# Patient Record
Sex: FEMALE | Race: WHITE | NOT HISPANIC OR LATINO | Employment: FULL TIME | URBAN - METROPOLITAN AREA
[De-identification: names, ages, dates, MRNs, and addresses within clinical notes are randomized per-mention and may not be internally consistent; named-entity substitution may affect disease eponyms.]

---

## 2017-02-14 DIAGNOSIS — Z30.9 ENCOUNTER FOR CONTRACEPTIVE MANAGEMENT, UNSPECIFIED CONTRACEPTIVE ENCOUNTER TYPE: ICD-10-CM

## 2017-05-03 DIAGNOSIS — Z30.9 ENCOUNTER FOR CONTRACEPTIVE MANAGEMENT: ICD-10-CM

## 2017-05-26 RX ORDER — NORGESTIMATE AND ETHINYL ESTRADIOL 0.25-0.035
1 KIT ORAL DAILY
Qty: 28 TABLET | Refills: 1 | Status: SHIPPED | OUTPATIENT
Start: 2017-05-26 | End: 2017-07-25 | Stop reason: SDUPTHER

## 2017-07-25 DIAGNOSIS — Z30.9 ENCOUNTER FOR CONTRACEPTIVE MANAGEMENT, UNSPECIFIED TYPE: Primary | ICD-10-CM

## 2017-07-25 RX ORDER — NORGESTIMATE AND ETHINYL ESTRADIOL 0.25-0.035
1 KIT ORAL DAILY
Qty: 28 TABLET | Refills: 0 | Status: SHIPPED | OUTPATIENT
Start: 2017-07-25 | End: 2017-08-17

## 2017-08-17 ENCOUNTER — OFFICE VISIT (OUTPATIENT)
Dept: OBSTETRICS AND GYNECOLOGY | Facility: CLINIC | Age: 42
End: 2017-08-17
Payer: COMMERCIAL

## 2017-08-17 VITALS
SYSTOLIC BLOOD PRESSURE: 102 MMHG | WEIGHT: 108.13 LBS | HEIGHT: 63 IN | DIASTOLIC BLOOD PRESSURE: 80 MMHG | BODY MASS INDEX: 19.16 KG/M2

## 2017-08-17 DIAGNOSIS — Z11.51 SCREENING FOR HUMAN PAPILLOMAVIRUS: ICD-10-CM

## 2017-08-17 DIAGNOSIS — Z01.419 WELL FEMALE EXAM WITH ROUTINE GYNECOLOGICAL EXAM: Primary | ICD-10-CM

## 2017-08-17 DIAGNOSIS — Z12.31 VISIT FOR SCREENING MAMMOGRAM: ICD-10-CM

## 2017-08-17 PROCEDURE — 99999 PR PBB SHADOW E&M-EST. PATIENT-LVL III: CPT | Mod: PBBFAC,,, | Performed by: OBSTETRICS & GYNECOLOGY

## 2017-08-17 PROCEDURE — 87624 HPV HI-RISK TYP POOLED RSLT: CPT

## 2017-08-17 PROCEDURE — 99396 PREV VISIT EST AGE 40-64: CPT | Mod: S$GLB,,, | Performed by: OBSTETRICS & GYNECOLOGY

## 2017-08-17 PROCEDURE — 88175 CYTOPATH C/V AUTO FLUID REDO: CPT

## 2017-08-17 RX ORDER — BUPROPION HYDROCHLORIDE 300 MG/1
300 TABLET ORAL DAILY
Qty: 30 TABLET | Refills: 5 | Status: ON HOLD | OUTPATIENT
Start: 2017-08-17 | End: 2018-01-08 | Stop reason: HOSPADM

## 2017-08-17 RX ORDER — NORGESTIMATE AND ETHINYL ESTRADIOL 0.25-0.035
1 KIT ORAL DAILY
Qty: 28 TABLET | Refills: 11 | Status: ON HOLD | OUTPATIENT
Start: 2017-08-17 | End: 2017-12-22

## 2017-08-17 NOTE — PROGRESS NOTES
Subjective:       Patient ID: Yen Falcon is a 41 y.o. female.    Chief Complaint:  Well Woman (c/o stress and anxiety 11/03/15-pap,rfx hpv-negative )      History of Present Illness  - here for annual. Doing well on ocps. Feels stressed and anxious with work and home. No si/hi. Is on Wellbutrin  mg and doesn't feel like it's enough. Says when she takes 2 then she's able to focus better.     Past Medical History:   Diagnosis Date    Allergy     Angio-edema     Recurrent upper respiratory infection (URI)     Urticaria        History reviewed. No pertinent surgical history.      Current Outpatient Prescriptions:     ascorbic acid (VITAMIN C) 500 MG tablet, Take 500 mg by mouth once daily., Disp: , Rfl:     ASMANEX  mcg/actuation HFAA, INL 1 PUFF PO BID, Disp: , Rfl: 6    ipratropium-albuterol (COMBIVENT RESPIMAT)  mcg/actuation inhaler, Inhale 1 puff into the lungs 4 (four) times daily., Disp: , Rfl:     multivitamin (ONE DAILY MULTIVITAMIN) per tablet, Take 1 tablet by mouth once daily., Disp: , Rfl:     norgestimate-ethinyl estradiol (MONONESSA, 28,) 0.25-35 mg-mcg per tablet, Take 1 tablet by mouth once daily., Disp: 28 tablet, Rfl: 11    VENTOLIN HFA 90 mcg/actuation inhaler, INL 2 PFS PO QID PRF WHZ, Disp: , Rfl: 3    buPROPion (WELLBUTRIN SR) 150 MG TBSR 12 hr tablet, Take 150 mg by mouth 2 (two) times daily., Disp: , Rfl:     clindamycin (CLEOCIN) 300 MG capsule, Take 300 mg by mouth 3 (three) times daily., Disp: , Rfl:     diphenhydrAMINE (SOMINEX) 25 mg tablet, Take 25 mg by mouth nightly as needed., Disp: , Rfl:     fluticasone (FLONASE) 50 mcg/actuation nasal spray, 2 sprays by Each Nare route once daily., Disp: 1 Bottle, Rfl: 6    pseudoephedrine-guaifenesin  mg (MUCINEX D)  mg per tablet, Take 1 tablet by mouth every 12 (twelve) hours., Disp: , Rfl:     Review of patient's allergies indicates:   Allergen Reactions    Doxycycline Rash    Cat/feline  "products        GYN & OB History  Patient's last menstrual period was 2017.   Date of Last Pap: No result found    OB History    Para Term  AB Living   2 2       2   SAB TAB Ectopic Multiple Live Births           2      # Outcome Date GA Lbr Calixto/2nd Weight Sex Delivery Anes PTL Lv   2 Para 06    F CS-LTranv  N SHANIA   1 Para 01    M CS-LTranv  N SHANIA          Social History     Social History    Marital status:      Spouse name: N/A    Number of children: N/A    Years of education: N/A     Occupational History    Not on file.     Social History Main Topics    Smoking status: Former Smoker     Packs/day: 0.25     Quit date: 2013    Smokeless tobacco: Never Used    Alcohol use Yes    Drug use: No    Sexual activity: Yes     Partners: Male     Birth control/ protection: OCP      Comment:      Other Topics Concern    Not on file     Social History Narrative    No narrative on file       Family History   Problem Relation Age of Onset    Allergies Father     Asthma Father     Breast cancer Maternal Grandfather     Breast cancer Maternal Aunt     Colon cancer Neg Hx     Ovarian cancer Neg Hx        Review of Systems  Review of Systems   Respiratory: Negative for shortness of breath.    Cardiovascular: Negative for chest pain and palpitations.   Gastrointestinal: Negative for blood in stool, nausea and vomiting.   Genitourinary:        - see HPI   Skin: Negative for rash and wound.   Allergic/Immunologic: Negative for immunocompromised state.   Neurological: Negative for dizziness and syncope.   Hematological: Negative for adenopathy.   Psychiatric/Behavioral: Negative for behavioral problems.        Objective:     Vitals:    17 1053   BP: 102/80   Weight: 49 kg (108 lb 2.2 oz)   Height: 5' 3" (1.6 m)       Physical Exam:   Constitutional: She is oriented to person, place, and time. She appears well-developed and well-nourished.        Pulmonary/Chest: " Right breast exhibits no mass, no nipple discharge, no skin change, no tenderness and no swelling. Left breast exhibits no mass, no nipple discharge, no skin change, no tenderness and no swelling. Breasts are symmetrical.        Abdominal: Soft. She exhibits no distension. There is no tenderness.     Genitourinary: Vagina normal and uterus normal. There is no tenderness or lesion on the right labia. There is no tenderness or lesion on the left labia. Cervix is normal. Right adnexum displays no mass, no tenderness and no fullness. Left adnexum displays no mass, no tenderness and no fullness. No vaginal discharge found. Additional cervical findings: pap smear done          Musculoskeletal: Moves all extremeties.       Neurological: She is alert and oriented to person, place, and time.     Psychiatric: She has a normal mood and affect.        Assessment/ Plan:     Orders Placed This Encounter    HPV High Risk Genotypes, PCR    Mammo Digital Screening Bilat with Tomosynthesis CAD    Liquid-based pap smear, screening    norgestimate-ethinyl estradiol (MONONESSA, 28,) 0.25-35 mg-mcg per tablet       Yen was seen today for well woman.    Diagnoses and all orders for this visit:    Well female exam with routine gynecological exam  -     Liquid-based pap smear, screening  -     HPV High Risk Genotypes, PCR  -     Mammo Digital Screening Bilat with Tomosynthesis CAD; Future    Visit for screening mammogram  -     Mammo Digital Screening Bilat with Tomosynthesis CAD; Future    Screening for human papillomavirus  -     HPV High Risk Genotypes, PCR    Other orders  -     norgestimate-ethinyl estradiol (MONONESSA, 28,) 0.25-35 mg-mcg per tablet; Take 1 tablet by mouth once daily.    - will increase to Wellbutrin  mg daily. Patient will call me to let me know if that relieves her symptoms. Offered to switch to Lexapro; patient states she may be taken that in her 20s. Is unsure if she had any issues with  it.    Return in about 1 year (around 8/17/2018).

## 2017-08-23 LAB
HPV HR 12 DNA CVX QL NAA+PROBE: NEGATIVE
HPV16 DNA SPEC QL NAA+PROBE: NEGATIVE
HPV18 DNA SPEC QL NAA+PROBE: NEGATIVE

## 2017-12-21 ENCOUNTER — HOSPITAL ENCOUNTER (EMERGENCY)
Facility: HOSPITAL | Age: 42
Discharge: ANOTHER HOSPITAL | End: 2017-12-22
Attending: EMERGENCY MEDICINE | Admitting: INTERNAL MEDICINE
Payer: COMMERCIAL

## 2017-12-21 DIAGNOSIS — J90 PLEURAL EFFUSION ON LEFT: ICD-10-CM

## 2017-12-21 DIAGNOSIS — J98.01 BRONCHOSPASM: ICD-10-CM

## 2017-12-21 DIAGNOSIS — D72.828 OTHER ELEVATED WHITE BLOOD CELL (WBC) COUNT: ICD-10-CM

## 2017-12-21 DIAGNOSIS — Z85.118 HISTORY OF LUNG CANCER: Primary | ICD-10-CM

## 2017-12-21 DIAGNOSIS — R04.2 COUGH WITH HEMOPTYSIS: ICD-10-CM

## 2017-12-21 DIAGNOSIS — R06.02 SOB (SHORTNESS OF BREATH): ICD-10-CM

## 2017-12-21 LAB
ALBUMIN SERPL BCP-MCNC: 2 G/DL
ALP SERPL-CCNC: 148 U/L
ALT SERPL W/O P-5'-P-CCNC: 51 U/L
ANION GAP SERPL CALC-SCNC: 14 MMOL/L
AST SERPL-CCNC: 47 U/L
B-HCG UR QL: NEGATIVE
BACTERIA #/AREA URNS HPF: ABNORMAL /HPF
BASOPHILS # BLD AUTO: 0.04 K/UL
BASOPHILS NFR BLD: 0.2 %
BILIRUB SERPL-MCNC: 0.4 MG/DL
BILIRUB UR QL STRIP: NEGATIVE
BUN SERPL-MCNC: 12 MG/DL
CALCIUM SERPL-MCNC: 9 MG/DL
CHLORIDE SERPL-SCNC: 98 MMOL/L
CLARITY UR: CLEAR
CO2 SERPL-SCNC: 23 MMOL/L
COLOR UR: YELLOW
CREAT SERPL-MCNC: 0.7 MG/DL
CTP QC/QA: YES
DIFFERENTIAL METHOD: ABNORMAL
EOSINOPHIL # BLD AUTO: 0.9 K/UL
EOSINOPHIL NFR BLD: 4.4 %
ERYTHROCYTE [DISTWIDTH] IN BLOOD BY AUTOMATED COUNT: 13 %
EST. GFR  (AFRICAN AMERICAN): >60 ML/MIN/1.73 M^2
EST. GFR  (NON AFRICAN AMERICAN): >60 ML/MIN/1.73 M^2
GLUCOSE SERPL-MCNC: 119 MG/DL
GLUCOSE UR QL STRIP: NEGATIVE
HCT VFR BLD AUTO: 37.1 %
HGB BLD-MCNC: 12.3 G/DL
HGB UR QL STRIP: NEGATIVE
KETONES UR QL STRIP: NEGATIVE
LEUKOCYTE ESTERASE UR QL STRIP: NEGATIVE
LYMPHOCYTES # BLD AUTO: 2.3 K/UL
LYMPHOCYTES NFR BLD: 11.9 %
MCH RBC QN AUTO: 29.1 PG
MCHC RBC AUTO-ENTMCNC: 33.2 G/DL
MCV RBC AUTO: 88 FL
MICROSCOPIC COMMENT: ABNORMAL
MONOCYTES # BLD AUTO: 1.3 K/UL
MONOCYTES NFR BLD: 6.5 %
NEUTROPHILS # BLD AUTO: 15 K/UL
NEUTROPHILS NFR BLD: 77 %
NITRITE UR QL STRIP: NEGATIVE
PH UR STRIP: 5 [PH] (ref 5–8)
PLATELET # BLD AUTO: 476 K/UL
PMV BLD AUTO: 9.5 FL
POTASSIUM SERPL-SCNC: 3.6 MMOL/L
PROT SERPL-MCNC: 6.5 G/DL
PROT UR QL STRIP: NEGATIVE
RBC # BLD AUTO: 4.23 M/UL
RBC #/AREA URNS HPF: 1 /HPF (ref 0–4)
SODIUM SERPL-SCNC: 135 MMOL/L
SP GR UR STRIP: 1.01 (ref 1–1.03)
SQUAMOUS #/AREA URNS HPF: 5 /HPF
TROPONIN I SERPL DL<=0.01 NG/ML-MCNC: 0.01 NG/ML
URN SPEC COLLECT METH UR: ABNORMAL
UROBILINOGEN UR STRIP-ACNC: ABNORMAL EU/DL
WBC # BLD AUTO: 19.52 K/UL
WBC #/AREA URNS HPF: 5 /HPF (ref 0–5)
WBC CLUMPS URNS QL MICRO: ABNORMAL

## 2017-12-21 PROCEDURE — 25000242 PHARM REV CODE 250 ALT 637 W/ HCPCS: Performed by: EMERGENCY MEDICINE

## 2017-12-21 PROCEDURE — 96360 HYDRATION IV INFUSION INIT: CPT

## 2017-12-21 PROCEDURE — 81025 URINE PREGNANCY TEST: CPT | Performed by: EMERGENCY MEDICINE

## 2017-12-21 PROCEDURE — 93010 ELECTROCARDIOGRAM REPORT: CPT | Mod: ,,, | Performed by: INTERNAL MEDICINE

## 2017-12-21 PROCEDURE — 81000 URINALYSIS NONAUTO W/SCOPE: CPT

## 2017-12-21 PROCEDURE — 80053 COMPREHEN METABOLIC PANEL: CPT

## 2017-12-21 PROCEDURE — 94640 AIRWAY INHALATION TREATMENT: CPT

## 2017-12-21 PROCEDURE — 85025 COMPLETE CBC W/AUTO DIFF WBC: CPT

## 2017-12-21 PROCEDURE — 25000003 PHARM REV CODE 250: Performed by: EMERGENCY MEDICINE

## 2017-12-21 PROCEDURE — 12000002 HC ACUTE/MED SURGE SEMI-PRIVATE ROOM

## 2017-12-21 PROCEDURE — 99285 EMERGENCY DEPT VISIT HI MDM: CPT | Mod: 25

## 2017-12-21 PROCEDURE — 96361 HYDRATE IV INFUSION ADD-ON: CPT

## 2017-12-21 PROCEDURE — 93005 ELECTROCARDIOGRAM TRACING: CPT

## 2017-12-21 PROCEDURE — 84484 ASSAY OF TROPONIN QUANT: CPT

## 2017-12-21 PROCEDURE — 27100107 HC POCKET PEAK FLOW METER

## 2017-12-21 RX ORDER — AZITHROMYCIN 250 MG/1
500 TABLET, FILM COATED ORAL DAILY
Status: CANCELLED | OUTPATIENT
Start: 2017-12-22

## 2017-12-21 RX ORDER — CEFTRIAXONE 1 G/1
1 INJECTION, POWDER, FOR SOLUTION INTRAMUSCULAR; INTRAVENOUS
Status: CANCELLED | OUTPATIENT
Start: 2017-12-22

## 2017-12-21 RX ORDER — ONDANSETRON 2 MG/ML
4 INJECTION INTRAMUSCULAR; INTRAVENOUS EVERY 8 HOURS PRN
Status: CANCELLED | OUTPATIENT
Start: 2017-12-21

## 2017-12-21 RX ORDER — IPRATROPIUM BROMIDE AND ALBUTEROL SULFATE 2.5; .5 MG/3ML; MG/3ML
3 SOLUTION RESPIRATORY (INHALATION) EVERY 4 HOURS
Status: CANCELLED | OUTPATIENT
Start: 2017-12-22

## 2017-12-21 RX ORDER — MORPHINE SULFATE 10 MG/ML
4 INJECTION INTRAMUSCULAR; INTRAVENOUS; SUBCUTANEOUS EVERY 4 HOURS PRN
Status: CANCELLED | OUTPATIENT
Start: 2017-12-21

## 2017-12-21 RX ORDER — ACETAMINOPHEN 325 MG/1
650 TABLET ORAL EVERY 8 HOURS PRN
Status: CANCELLED | OUTPATIENT
Start: 2017-12-21

## 2017-12-21 RX ORDER — PANTOPRAZOLE SODIUM 40 MG/10ML
40 INJECTION, POWDER, LYOPHILIZED, FOR SOLUTION INTRAVENOUS DAILY
Status: CANCELLED | OUTPATIENT
Start: 2017-12-22

## 2017-12-21 RX ORDER — IPRATROPIUM BROMIDE AND ALBUTEROL SULFATE 2.5; .5 MG/3ML; MG/3ML
3 SOLUTION RESPIRATORY (INHALATION)
Status: COMPLETED | OUTPATIENT
Start: 2017-12-21 | End: 2017-12-21

## 2017-12-21 RX ORDER — NORGESTIMATE AND ETHINYL ESTRADIOL 0.25-0.035
1 KIT ORAL DAILY
Status: CANCELLED | OUTPATIENT
Start: 2017-12-22

## 2017-12-21 RX ORDER — MORPHINE SULFATE 10 MG/ML
2 INJECTION INTRAMUSCULAR; INTRAVENOUS; SUBCUTANEOUS EVERY 4 HOURS PRN
Status: CANCELLED | OUTPATIENT
Start: 2017-12-21

## 2017-12-21 RX ORDER — HEPARIN SODIUM 5000 [USP'U]/ML
5000 INJECTION, SOLUTION INTRAVENOUS; SUBCUTANEOUS EVERY 8 HOURS
Status: CANCELLED | OUTPATIENT
Start: 2017-12-22

## 2017-12-21 RX ORDER — AMOXICILLIN 250 MG
1 CAPSULE ORAL 2 TIMES DAILY
Status: CANCELLED | OUTPATIENT
Start: 2017-12-22

## 2017-12-21 RX ADMIN — IPRATROPIUM BROMIDE AND ALBUTEROL SULFATE 3 ML: .5; 3 SOLUTION RESPIRATORY (INHALATION) at 09:12

## 2017-12-21 RX ADMIN — SODIUM CHLORIDE 1000 ML: 0.9 INJECTION, SOLUTION INTRAVENOUS at 10:12

## 2017-12-22 ENCOUNTER — HOSPITAL ENCOUNTER (OUTPATIENT)
Facility: HOSPITAL | Age: 42
Discharge: HOME | DRG: 180 | End: 2017-12-23
Attending: HOSPITALIST | Admitting: HOSPITALIST
Payer: COMMERCIAL

## 2017-12-22 VITALS
OXYGEN SATURATION: 95 % | HEIGHT: 63 IN | HEART RATE: 120 BPM | RESPIRATION RATE: 25 BRPM | BODY MASS INDEX: 18.61 KG/M2 | WEIGHT: 105 LBS | TEMPERATURE: 98 F | DIASTOLIC BLOOD PRESSURE: 80 MMHG | SYSTOLIC BLOOD PRESSURE: 126 MMHG

## 2017-12-22 DIAGNOSIS — C34.92 CARCINOMA OF LEFT LUNG: Primary | ICD-10-CM

## 2017-12-22 DIAGNOSIS — J90 PLEURAL EFFUSION ON LEFT: ICD-10-CM

## 2017-12-22 PROBLEM — J18.9 PARAPNEUMONIC EFFUSION: Status: ACTIVE | Noted: 2017-12-22

## 2017-12-22 PROBLEM — J91.8 PARAPNEUMONIC EFFUSION: Status: ACTIVE | Noted: 2017-12-22

## 2017-12-22 PROBLEM — F41.9 ANXIETY: Status: ACTIVE | Noted: 2017-12-22

## 2017-12-22 PROBLEM — J96.01 ACUTE HYPOXEMIC RESPIRATORY FAILURE: Status: ACTIVE | Noted: 2017-12-22

## 2017-12-22 PROBLEM — C77.3 MALIGNANT NEOPLASM METASTATIC TO LYMPH NODE OF AXILLA: Status: ACTIVE | Noted: 2017-12-22

## 2017-12-22 LAB
ANION GAP SERPL CALC-SCNC: 12 MMOL/L
APPEARANCE FLD: ABNORMAL
APTT BLDCRRT: 25.3 SEC
BASOPHILS # BLD AUTO: 0.02 K/UL
BASOPHILS NFR BLD: 0.1 %
BODY FLD TYPE: ABNORMAL
BUN SERPL-MCNC: 13 MG/DL
CALCIUM SERPL-MCNC: 8.1 MG/DL
CHLORIDE SERPL-SCNC: 100 MMOL/L
CO2 SERPL-SCNC: 23 MMOL/L
COLOR FLD: YELLOW
CREAT SERPL-MCNC: 0.7 MG/DL
DIFFERENTIAL METHOD: ABNORMAL
EOSINOPHIL # BLD AUTO: 0.4 K/UL
EOSINOPHIL NFR BLD: 2.1 %
ERYTHROCYTE [DISTWIDTH] IN BLOOD BY AUTOMATED COUNT: 12.9 %
EST. GFR  (AFRICAN AMERICAN): >60 ML/MIN/1.73 M^2
EST. GFR  (NON AFRICAN AMERICAN): >60 ML/MIN/1.73 M^2
ESTIMATED AVG GLUCOSE: 100 MG/DL
GLUCOSE SERPL-MCNC: 139 MG/DL
GRAM STN SPEC: NORMAL
GRAM STN SPEC: NORMAL
HBA1C MFR BLD HPLC: 5.1 %
HCT VFR BLD AUTO: 35.2 %
HGB BLD-MCNC: 11.2 G/DL
INR PPP: 1
LYMPHOCYTES # BLD AUTO: 0.9 K/UL
LYMPHOCYTES NFR BLD: 4.5 %
LYMPHOCYTES NFR FLD MANUAL: 4 %
MAGNESIUM SERPL-MCNC: 1.8 MG/DL
MCH RBC QN AUTO: 28.7 PG
MCHC RBC AUTO-ENTMCNC: 31.8 G/DL
MCV RBC AUTO: 90 FL
MONOCYTES # BLD AUTO: 0.7 K/UL
MONOCYTES NFR BLD: 3.5 %
MONOS+MACROS NFR FLD MANUAL: 23 %
NEUTROPHILS # BLD AUTO: 17.5 K/UL
NEUTROPHILS NFR BLD: 89.8 %
NEUTROPHILS NFR FLD MANUAL: 19 %
OTHER CELLS FLD MANUAL: 54 %
PHOSPHATE SERPL-MCNC: 2.8 MG/DL
PLATELET # BLD AUTO: 444 K/UL
PMV BLD AUTO: 9.7 FL
POTASSIUM SERPL-SCNC: 3.5 MMOL/L
PROCALCITONIN SERPL IA-MCNC: 0.32 NG/ML
PROTHROMBIN TIME: 10.9 SEC
RBC # BLD AUTO: 3.9 M/UL
SODIUM SERPL-SCNC: 135 MMOL/L
TSH SERPL DL<=0.005 MIU/L-ACNC: 1.16 UIU/ML
WBC # BLD AUTO: 19.63 K/UL
WBC # FLD: 1159 /CU MM

## 2017-12-22 PROCEDURE — 84443 ASSAY THYROID STIM HORMONE: CPT

## 2017-12-22 PROCEDURE — 11000001 HC ACUTE MED/SURG PRIVATE ROOM

## 2017-12-22 PROCEDURE — 87205 SMEAR GRAM STAIN: CPT

## 2017-12-22 PROCEDURE — 25000003 PHARM REV CODE 250: Performed by: INTERNAL MEDICINE

## 2017-12-22 PROCEDURE — 85025 COMPLETE CBC W/AUTO DIFF WBC: CPT

## 2017-12-22 PROCEDURE — 85610 PROTHROMBIN TIME: CPT

## 2017-12-22 PROCEDURE — 25000242 PHARM REV CODE 250 ALT 637 W/ HCPCS: Performed by: NURSE PRACTITIONER

## 2017-12-22 PROCEDURE — 27000221 HC OXYGEN, UP TO 24 HOURS

## 2017-12-22 PROCEDURE — 88305 TISSUE EXAM BY PATHOLOGIST: CPT | Mod: 26,,, | Performed by: PATHOLOGY

## 2017-12-22 PROCEDURE — 80048 BASIC METABOLIC PNL TOTAL CA: CPT

## 2017-12-22 PROCEDURE — 94640 AIRWAY INHALATION TREATMENT: CPT

## 2017-12-22 PROCEDURE — 25000003 PHARM REV CODE 250: Performed by: NURSE PRACTITIONER

## 2017-12-22 PROCEDURE — 99223 1ST HOSP IP/OBS HIGH 75: CPT | Mod: ,,, | Performed by: INTERNAL MEDICINE

## 2017-12-22 PROCEDURE — G0378 HOSPITAL OBSERVATION PER HR: HCPCS

## 2017-12-22 PROCEDURE — 36415 COLL VENOUS BLD VENIPUNCTURE: CPT

## 2017-12-22 PROCEDURE — 89051 BODY FLUID CELL COUNT: CPT

## 2017-12-22 PROCEDURE — 84145 PROCALCITONIN (PCT): CPT

## 2017-12-22 PROCEDURE — G0379 DIRECT REFER HOSPITAL OBSERV: HCPCS

## 2017-12-22 PROCEDURE — 85730 THROMBOPLASTIN TIME PARTIAL: CPT

## 2017-12-22 PROCEDURE — 87075 CULTR BACTERIA EXCEPT BLOOD: CPT

## 2017-12-22 PROCEDURE — 88305 TISSUE EXAM BY PATHOLOGIST: CPT | Performed by: PATHOLOGY

## 2017-12-22 PROCEDURE — 94761 N-INVAS EAR/PLS OXIMETRY MLT: CPT

## 2017-12-22 PROCEDURE — 25000242 PHARM REV CODE 250 ALT 637 W/ HCPCS: Performed by: EMERGENCY MEDICINE

## 2017-12-22 PROCEDURE — 87015 SPECIMEN INFECT AGNT CONCNTJ: CPT

## 2017-12-22 PROCEDURE — 88342 IMHCHEM/IMCYTCHM 1ST ANTB: CPT | Mod: 26,,, | Performed by: PATHOLOGY

## 2017-12-22 PROCEDURE — 87116 MYCOBACTERIA CULTURE: CPT

## 2017-12-22 PROCEDURE — 25000003 PHARM REV CODE 250: Performed by: HOSPITALIST

## 2017-12-22 PROCEDURE — 63600175 PHARM REV CODE 636 W HCPCS: Performed by: INTERNAL MEDICINE

## 2017-12-22 PROCEDURE — 94799 UNLISTED PULMONARY SVC/PX: CPT

## 2017-12-22 PROCEDURE — 83036 HEMOGLOBIN GLYCOSYLATED A1C: CPT

## 2017-12-22 PROCEDURE — 88112 CYTOPATH CELL ENHANCE TECH: CPT | Mod: 26,,, | Performed by: PATHOLOGY

## 2017-12-22 PROCEDURE — 63600175 PHARM REV CODE 636 W HCPCS: Performed by: NURSE PRACTITIONER

## 2017-12-22 PROCEDURE — 27100171 HC OXYGEN HIGH FLOW UP TO 24 HOURS

## 2017-12-22 PROCEDURE — 87070 CULTURE OTHR SPECIMN AEROBIC: CPT

## 2017-12-22 PROCEDURE — 99900035 HC TECH TIME PER 15 MIN (STAT)

## 2017-12-22 PROCEDURE — 84100 ASSAY OF PHOSPHORUS: CPT

## 2017-12-22 PROCEDURE — 83735 ASSAY OF MAGNESIUM: CPT

## 2017-12-22 RX ORDER — SODIUM CHLORIDE 0.9 % (FLUSH) 0.9 %
5 SYRINGE (ML) INJECTION
Status: DISCONTINUED | OUTPATIENT
Start: 2017-12-22 | End: 2017-12-23 | Stop reason: HOSPADM

## 2017-12-22 RX ORDER — METHYLPREDNISOLONE SOD SUCC 125 MG
80 VIAL (EA) INJECTION EVERY 8 HOURS
Status: DISCONTINUED | OUTPATIENT
Start: 2017-12-22 | End: 2017-12-22

## 2017-12-22 RX ORDER — IPRATROPIUM BROMIDE AND ALBUTEROL SULFATE 2.5; .5 MG/3ML; MG/3ML
15 SOLUTION RESPIRATORY (INHALATION) CONTINUOUS
Status: DISCONTINUED | OUTPATIENT
Start: 2017-12-22 | End: 2017-12-22

## 2017-12-22 RX ORDER — BUPROPION HYDROCHLORIDE 150 MG/1
300 TABLET ORAL DAILY
Status: DISCONTINUED | OUTPATIENT
Start: 2017-12-22 | End: 2017-12-23 | Stop reason: HOSPADM

## 2017-12-22 RX ORDER — ONDANSETRON 2 MG/ML
4 INJECTION INTRAMUSCULAR; INTRAVENOUS EVERY 8 HOURS PRN
Status: DISCONTINUED | OUTPATIENT
Start: 2017-12-22 | End: 2017-12-23 | Stop reason: HOSPADM

## 2017-12-22 RX ORDER — KETOROLAC TROMETHAMINE 30 MG/ML
15 INJECTION, SOLUTION INTRAMUSCULAR; INTRAVENOUS EVERY 6 HOURS PRN
Status: DISCONTINUED | OUTPATIENT
Start: 2017-12-22 | End: 2017-12-23 | Stop reason: HOSPADM

## 2017-12-22 RX ORDER — IPRATROPIUM BROMIDE AND ALBUTEROL SULFATE 2.5; .5 MG/3ML; MG/3ML
15 SOLUTION RESPIRATORY (INHALATION) CONTINUOUS
Status: DISCONTINUED | OUTPATIENT
Start: 2017-12-22 | End: 2017-12-22 | Stop reason: HOSPADM

## 2017-12-22 RX ORDER — IPRATROPIUM BROMIDE AND ALBUTEROL SULFATE 2.5; .5 MG/3ML; MG/3ML
3 SOLUTION RESPIRATORY (INHALATION) EVERY 4 HOURS
Status: DISCONTINUED | OUTPATIENT
Start: 2017-12-22 | End: 2017-12-23 | Stop reason: HOSPADM

## 2017-12-22 RX ORDER — GUAIFENESIN 600 MG/1
1200 TABLET, EXTENDED RELEASE ORAL 2 TIMES DAILY
Status: DISCONTINUED | OUTPATIENT
Start: 2017-12-22 | End: 2017-12-23 | Stop reason: HOSPADM

## 2017-12-22 RX ORDER — LORAZEPAM 1 MG/1
1 TABLET ORAL EVERY 6 HOURS PRN
Status: DISCONTINUED | OUTPATIENT
Start: 2017-12-22 | End: 2017-12-23 | Stop reason: HOSPADM

## 2017-12-22 RX ORDER — METHYLPREDNISOLONE SOD SUCC 125 MG
125 VIAL (EA) INJECTION ONCE
Status: COMPLETED | OUTPATIENT
Start: 2017-12-22 | End: 2017-12-22

## 2017-12-22 RX ORDER — HYDROCODONE BITARTRATE AND ACETAMINOPHEN 7.5; 325 MG/1; MG/1
1 TABLET ORAL EVERY 6 HOURS PRN
Status: DISCONTINUED | OUTPATIENT
Start: 2017-12-22 | End: 2017-12-23 | Stop reason: HOSPADM

## 2017-12-22 RX ORDER — ENOXAPARIN SODIUM 100 MG/ML
40 INJECTION SUBCUTANEOUS EVERY 24 HOURS
Status: DISCONTINUED | OUTPATIENT
Start: 2017-12-22 | End: 2017-12-23 | Stop reason: HOSPADM

## 2017-12-22 RX ADMIN — IPRATROPIUM BROMIDE AND ALBUTEROL SULFATE 3 ML: .5; 3 SOLUTION RESPIRATORY (INHALATION) at 12:12

## 2017-12-22 RX ADMIN — METHYLPREDNISOLONE SODIUM SUCCINATE 80 MG: 125 INJECTION, POWDER, FOR SOLUTION INTRAMUSCULAR; INTRAVENOUS at 01:12

## 2017-12-22 RX ADMIN — IPRATROPIUM BROMIDE AND ALBUTEROL SULFATE 3 ML: .5; 3 SOLUTION RESPIRATORY (INHALATION) at 03:12

## 2017-12-22 RX ADMIN — KETOROLAC TROMETHAMINE 15 MG: 30 INJECTION, SOLUTION INTRAMUSCULAR at 11:12

## 2017-12-22 RX ADMIN — IPRATROPIUM BROMIDE AND ALBUTEROL SULFATE 15 ML: .5; 3 SOLUTION RESPIRATORY (INHALATION) at 01:12

## 2017-12-22 RX ADMIN — METHYLPREDNISOLONE SODIUM SUCCINATE 125 MG: 125 INJECTION, POWDER, FOR SOLUTION INTRAMUSCULAR; INTRAVENOUS at 02:12

## 2017-12-22 RX ADMIN — BUPROPION HYDROCHLORIDE 300 MG: 150 TABLET, FILM COATED, EXTENDED RELEASE ORAL at 08:12

## 2017-12-22 RX ADMIN — IPRATROPIUM BROMIDE AND ALBUTEROL SULFATE 3 ML: .5; 3 SOLUTION RESPIRATORY (INHALATION) at 04:12

## 2017-12-22 RX ADMIN — KETOROLAC TROMETHAMINE 15 MG: 30 INJECTION, SOLUTION INTRAMUSCULAR at 04:12

## 2017-12-22 RX ADMIN — IPRATROPIUM BROMIDE AND ALBUTEROL SULFATE 3 ML: .5; 3 SOLUTION RESPIRATORY (INHALATION) at 09:12

## 2017-12-22 RX ADMIN — ENOXAPARIN SODIUM 40 MG: 100 INJECTION SUBCUTANEOUS at 04:12

## 2017-12-22 RX ADMIN — LORAZEPAM 1 MG: 1 TABLET ORAL at 02:12

## 2017-12-22 RX ADMIN — GUAIFENESIN 1200 MG: 600 TABLET, EXTENDED RELEASE ORAL at 05:12

## 2017-12-22 RX ADMIN — IPRATROPIUM BROMIDE AND ALBUTEROL SULFATE 3 ML: .5; 3 SOLUTION RESPIRATORY (INHALATION) at 08:12

## 2017-12-22 RX ADMIN — DEXTROSE 1 G: 50 INJECTION, SOLUTION INTRAVENOUS at 04:12

## 2017-12-22 RX ADMIN — KETOROLAC TROMETHAMINE 15 MG: 30 INJECTION, SOLUTION INTRAMUSCULAR at 02:12

## 2017-12-22 NOTE — ASSESSMENT & PLAN NOTE
The preliminary pathology from 12/18 on the biopsy of the right axillary node was consistent with carcinoma.  Further immunostains pending.    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  The subcutaneous nodules represent tumor deposits.    Will need a PET scan as outpatient and brain MRI for completion of staging.    Will need to await the final pathology report to determine the exact anatomic type of malignancy prior to determining what type of treatment should be administered.    I discussed all these aspects with the patient, her aunt and her mother at the bedside.  All questions answered.

## 2017-12-22 NOTE — PLAN OF CARE
12/22/17 1327   Discharge Assessment   Assessment Type Discharge Planning Assessment   Confirmed/corrected address and phone number on facesheet? Yes   Assessment information obtained from? Patient   Expected Length of Stay (days) 2   Communicated expected length of stay with patient/caregiver yes   Prior to hospitilization cognitive status: Alert/Oriented   Prior to hospitalization functional status: Independent   Current cognitive status: Alert/Oriented   Current Functional Status: Independent   Lives With alone   Able to Return to Prior Arrangements yes   Is patient able to care for self after discharge? Yes   Who are your caregiver(s) and their phone number(s)? (Mother [phone])   Patient's perception of discharge disposition home or selfcare   Readmission Within The Last 30 Days no previous admission in last 30 days   Patient currently being followed by outpatient case management? No   Patient currently receives any other outside agency services? No   Equipment Currently Used at Home none   Do you have any problems affording any of your prescribed medications? No   Is the patient taking medications as prescribed? yes   Does the patient have transportation home? Yes   Transportation Available family or friend will provide;car   Does the patient receive services at the [facility]? No   Discharge Plan A Home   Discharge Plan B Home with family   Patient/Family In Agreement With Plan yes

## 2017-12-22 NOTE — CONSULTS
[facility]  Hematology/Oncology  Consult Note    Patient Name: Yen Falcon  MRN: [id number]  Admission Date: 12/22/2017  Hospital Length of Stay: 0 days  Code Status: Full Code   Attending Provider: Brian He MD  Consulting Provider: Vinnie Quiros MD  Primary Care Physician: Lauren Goodman MD  Principal Problem:Pleural effusion on left    Consults  Subjective:     HPI:  This is a 42-year-old female transferred from [facility] for dyspnea and pleural effusion.    She has a history of asthma and a 10-pack-year history of smoking cigarettes.  She went to the ER at [place] 2 months ago with symptoms of cough and dyspnea and was given antibiotics for community-acquired pneumonia.  Her symptoms did not improve, she saw her primary care physician a month ago and a chest x-ray showed fluid in the lung.  Around that time she started to feel a nodule near the right axilla.  She saw Dr. Stauffer in pulmonary and underwent a biopsy of the right axillary node on 12/17/17.    In the interim her dyspnea worsened and she went to the ER at [facility] and was transferred here for pulmonary evaluation as a large left pleural effusion was noted on chest x-ray.  1.2 liters of fluid was successfully drained by interventional radiology today.    She lost over 5 pounds in the past 2 months.    CT of the chest done today reveals an ill-defined large left suprahilar mass encasing the left mainstem and upper lobe bronchus and left main pulmonary artery concerning for primary lung malignancy.    No active hemoptysis or fevers.    She has a total of 4 subcutaneous nodules-1 behind the right ear, one in the right submandibular area, one in the right axillary area and one in the right lateral abdominal quadrant area.    Oncology Treatment Plan:   [No treatment plan]    Medications:  Continuous Infusions:  Scheduled Meds:   albuterol-ipratropium 2.5mg-0.5mg/3mL  3 mL Nebulization Q4H    buPROPion   300 mg Oral Daily    cefTRIAXone (ROCEPHIN) IVPB  1 g Intravenous Q12H    enoxaparin  40 mg Subcutaneous Daily     PRN Meds:hydrocodone-acetaminophen 7.5-325mg, ketorolac, LORazepam, ondansetron, sodium chloride 0.9%     Review of patient's allergies indicates:   Allergen Reactions    Doxycycline Rash    Cat/feline products         Past Medical History:   Diagnosis Date    Allergy     Angio-edema     Asthma     Recurrent upper respiratory infection (URI)     Urticaria      Past Surgical History:   Procedure Laterality Date     SECTION      2 occurences     Family History     Problem Relation (Age of Onset)    Allergies Father    Asthma Father    Breast cancer Maternal Grandfather, Maternal Aunt        Social History Main Topics    Smoking status: Former Smoker     Packs/day: 0.25     Quit date: 2013    Smokeless tobacco: Never Used    Alcohol use Yes    Drug use: No    Sexual activity: Yes     Partners: Male     Birth control/ protection: OCP      Comment:        Review of Systems   Constitutional: Positive for activity change, appetite change, fatigue and unexpected weight change. Negative for fever.   HENT: Negative for mouth sores and nosebleeds.    Eyes: Negative for photophobia and pain.   Respiratory: Positive for shortness of breath. Negative for wheezing.    Cardiovascular: Negative for chest pain and palpitations.   Gastrointestinal: Negative for abdominal pain, nausea and vomiting.   Genitourinary: Negative for flank pain and hematuria.   Musculoskeletal: Negative for neck pain and neck stiffness.   Skin: Negative for rash and wound.   Neurological: Negative for seizures and syncope.   Hematological: Positive for adenopathy. Does not bruise/bleed easily.   Psychiatric/Behavioral: Negative for behavioral problems and confusion. The patient is nervous/anxious.    All other systems reviewed and are negative.    Objective:     Vital Signs (Most Recent):  Temp: 98.7 °F (37.1  °C) (12/22/17 1522)  Pulse: (!) 116 (12/22/17 1522)  Resp: (!) 22 (12/22/17 1522)  BP: 110/63 (12/22/17 1522)  SpO2: 96 % (12/22/17 1218) Vital Signs (24h Range):  Temp:  [97.5 °F (36.4 °C)-98.9 °F (37.2 °C)] 98.7 °F (37.1 °C)  Pulse:  [109-126] 116  Resp:  [16-28] 22  SpO2:  [93 %-100 %] 96 %  BP: (110-155)/() 110/63     Weight: 48.6 kg (107 lb 2.3 oz)  Body mass index is 18.98 kg/m².  Body surface area is 1.47 meters squared.      Intake/Output Summary (Last 24 hours) at 12/22/17 1632  Last data filed at 12/22/17 0845   Gross per 24 hour   Intake                0 ml   Output             1.25 ml   Net            -1.25 ml       Physical Exam   Constitutional: She is cooperative. She does not appear ill. No distress.   HENT:   Head: Head is without laceration, without right periorbital erythema and without left periorbital erythema.   Nose: No epistaxis.   Mouth/Throat: Oropharynx is clear and moist. No oropharyngeal exudate. No tonsillar exudate.   Eyes: Conjunctivae are normal.   Neck: Neck supple. No thyroid mass and no thyromegaly present.   Cardiovascular: Normal rate and regular rhythm.  Exam reveals no friction rub.    Pulmonary/Chest: Breath sounds normal. No accessory muscle usage or stridor. No tachypnea. No respiratory distress. Chest wall is not dull to percussion. She exhibits no tenderness.   Abdominal: Soft. She exhibits no distension, no ascites and no mass. There is no hepatosplenomegaly.   Musculoskeletal: She exhibits no edema.   Lymphadenopathy:        Head (right side): No submental and no submandibular adenopathy present.        Head (left side): No submental and no submandibular adenopathy present.     She has no cervical adenopathy.     She has no axillary adenopathy.   Neurological: She is alert. She has normal strength. No cranial nerve deficit.   Skin: No bruising, no petechiae and no rash noted. She is not diaphoretic.        Psychiatric: Her behavior is normal. Thought content  normal. Cognition and memory are normal. She does not exhibit a depressed mood.   Vitals reviewed.      Significant Labs:   All pertinent labs from the last 24 hours have been reviewed.    Diagnostic Results:  I have reviewed all pertinent imaging results/findings within the past 24 hours.    Assessment/Plan:     Malignant neoplasm metastatic to lymph node of axilla    The preliminary pathology from 12/18 on the biopsy of the right axillary node was consistent with carcinoma.  Further immunostains pending.    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  The subcutaneous nodules represent tumor deposits.    Will need a PET scan as outpatient and brain MRI for completion of staging.    Will need to await the final pathology report to determine the exact anatomic type of malignancy prior to determining what type of treatment should be administered.    I discussed all these aspects with the patient, her aunt and her mother at the bedside.  All questions answered.          Vinnie Quiros MD  Hematology/Oncology  [facility]

## 2017-12-22 NOTE — HPI
This is a 42-year-old female transferred from [facility] for dyspnea and pleural effusion.    She has a history of asthma and a 10-pack-year history of smoking cigarettes.  She went to the ER at [place] 2 months ago with symptoms of cough and dyspnea and was given antibiotics for community-acquired pneumonia.  Her symptoms did not improve, she saw her primary care physician a month ago and a chest x-ray showed fluid in the lung.  Around that time she started to feel a nodule near the right axilla.  She saw Dr. Stauffer in pulmonary and underwent a biopsy of the right axillary node on 12/17/17.    In the interim her dyspnea worsened and she went to the ER at [facility] and was transferred here for pulmonary evaluation as a large left pleural effusion was noted on chest x-ray.  1.2 liters of fluid was successfully drained by interventional radiology today.    She lost over 5 pounds in the past 2 months.    CT of the chest done today reveals an ill-defined large left suprahilar mass encasing the left mainstem and upper lobe bronchus and left main pulmonary artery concerning for primary lung malignancy.    No active hemoptysis or fevers.    She has a total of 4 subcutaneous nodules-1 behind the right ear, one in the right submandibular area, one in the right axillary area and one in the right lateral abdominal quadrant area.

## 2017-12-22 NOTE — PROCEDURES
Radiology Post-Procedure Note    Pre Op Diagnosis: L effusion  Post Op Diagnosis: Same    Procedure: L thoracentesis    Procedure performed by: Clinton Perales MD    Written Informed Consent Obtained: Yes  Specimen Removed: YES 1.25 L serous asctes  Estimated Blood Loss: Minimal    Findings:   Successful L thoracentesis. Sent for requested labs.    Patient tolerated procedure well.    @SIG@

## 2017-12-22 NOTE — ASSESSMENT & PLAN NOTE
Hypoxemic Respiratory Failure  Duoneb Treatments every 4 hours  Solumedrol 125 mg IV then 8- mg TIB  HiFLo Nasal Cannula Oxygen, When for Comfort and SaO2 > 93%

## 2017-12-22 NOTE — HOSPITAL COURSE
She required supplemental oxygen.  Interventional Radiology performed thoracentesis removing 1.25 liters of serous fluid, which was sent for labs, culture, and cytology.  Pulmonology and Oncology were consulted.  Repeat x-ray showed the suspected tumor that was disguised by the pleural effusion.  Chest CT showed a left suprahilar mass appearing to encase the left main stem and upper lobe bronchi as well as the left main pulmonary artery, mediastinal and right axillary lymphadenopathy, and scattered consolidations in the lungs apart from the area obstructed by the mass.  Oncologist Dr. Vinnie Quiros noted subcutaneous deposits consistent with metastases.  He recommended outpatient PET and brain MRI.  Pulmonology recommended prophylactic anticoagulation with enoxaparin.  Oxygen saturation on room air was 88%.  She was prescribed home oxygen, a course of levofloxacin, enoxaparin 40 mg daily, hydrocodone-acetaminophen 7.5-325 mg (20 tablets) and lorazepam 1 mg (20 tablets) for panic attacks.  She will follow up with Dr. Quiros.

## 2017-12-22 NOTE — CONSULTS
Consult/H&P Note  Interventional Radiology    Consult Requested By: medicine    Reason for Consult: L pleural effusion    SUBJECTIVE:     Chief Complaint: SOB    History of Present Illness: 41 yo F with recurrent URI/pneumonia and large L effusion.    Past Medical History:   Diagnosis Date    Allergy     Angio-edema     Asthma     Recurrent upper respiratory infection (URI)     Urticaria      Past Surgical History:   Procedure Laterality Date     SECTION      2 occurences     Family History   Problem Relation Age of Onset    Allergies Father     Asthma Father     Breast cancer Maternal Grandfather     Breast cancer Maternal Aunt     Colon cancer Neg Hx     Ovarian cancer Neg Hx      Social History   Substance Use Topics    Smoking status: Former Smoker     Packs/day: 0.25     Quit date: 2013    Smokeless tobacco: Never Used    Alcohol use Yes       Review of Systems:  As per H&P      OBJECTIVE:     Vital Signs Range (Last 24H):  Temp:  [97.9 °F (36.6 °C)-98.9 °F (37.2 °C)]   Pulse:  [114-126]   Resp:  [16-28]   BP: (123-155)/()   SpO2:  [93 %-97 %]     Physical Exam:  General- Patient alert and oriented x3 in NAD  ENT- PERRLA,  Neck- No masses  CV- sinus tachycardia  Resp-  Decreases BS L lung, SOB  GI- Non tender/non-distended  Extrem- No cyanosis, clubbing, edema.   Derm- No rashes, masses, or lesions noted  Neuro-  No focal deficits noted.     Physical Exam  Body mass index is 18.98 kg/m².    Scheduled Meds:    albuterol-ipratropium 2.5mg-0.5mg/3mL  3 mL Nebulization Q4H    buPROPion  300 mg Oral Daily    enoxaparin  40 mg Subcutaneous Daily    methylPREDNISolone sodium succinate  80 mg Intravenous Q8H     Continuous Infusions:   PRN Meds:ketorolac, LORazepam, ondansetron, sodium chloride 0.9%    Allergies:   Review of patient's allergies indicates:   Allergen Reactions    Doxycycline Rash    Cat/feline products        Labs:    Recent Labs  Lab 17  0448   INR 1.0        Recent Labs  Lab 12/22/17 0448   WBC 19.63*   HGB 11.2*   HCT 35.2*   MCV 90   *      Recent Labs  Lab 12/21/17  2155 12/22/17 0448   * 139*   * 135*   K 3.6 3.5   CL 98 100   CO2 23 23   BUN 12 13   CREATININE 0.7 0.7   CALCIUM 9.0 8.1*   MG  --  1.8   ALT 51*  --    AST 47*  --    ALBUMIN 2.0*  --    BILITOT 0.4  --        Vitals (Most Recent):  Temp: 98.4 °F (36.9 °C) (12/22/17 0355)  Pulse: (!) 121 (12/22/17 0355)  Resp: 16 (12/22/17 0355)  BP: 139/67 (12/22/17 0355)  SpO2: (!) 94 % (12/22/17 0330)    ASA: 3  Mallampati: 2    Consent obtained    ASSESSMENT/PLAN:     L Thoracentesis.  Local anesthesia.    Active Hospital Problems    Diagnosis  POA    *Left parapneumonic effusion [J18.9, J91.8]  Yes    Acute hypoxemic respiratory failure [J96.01]  Yes    Anxiety [F41.9]  Yes    Asthma [J45.909]  Yes     Chronic     Note: Unchanged        Resolved Hospital Problems    Diagnosis Date Resolved POA   No resolved problems to display.           Clinton Perales MD

## 2017-12-22 NOTE — SUBJECTIVE & OBJECTIVE
Past Medical History:   Diagnosis Date    Allergy     Angio-edema     Asthma     Recurrent upper respiratory infection (URI)     Urticaria        Past Surgical History:   Procedure Laterality Date     SECTION      2 occurences       Review of patient's allergies indicates:   Allergen Reactions    Doxycycline Rash    Cat/feline products        Current Facility-Administered Medications on File Prior to Encounter   Medication    [COMPLETED] albuterol-ipratropium 2.5mg-0.5mg/3mL nebulizer solution 3 mL    [COMPLETED] sodium chloride 0.9% bolus 1,000 mL    [DISCONTINUED] albuterol-ipratropium 2.5mg-0.5mg/3mL nebulizer solution 15 mL    [DISCONTINUED] albuterol-ipratropium 2.5mg-0.5mg/3mL nebulizer solution 15 mL     Current Outpatient Prescriptions on File Prior to Encounter   Medication Sig    ascorbic acid (VITAMIN C) 500 MG tablet Take 500 mg by mouth once daily.    buPROPion (WELLBUTRIN XL) 300 MG 24 hr tablet Take 1 tablet (300 mg total) by mouth once daily.    diphenhydrAMINE (SOMINEX) 25 mg tablet Take 25 mg by mouth nightly as needed.    multivitamin (ONE DAILY MULTIVITAMIN) per tablet Take 1 tablet by mouth once daily.    pseudoephedrine-guaifenesin  mg (MUCINEX D)  mg per tablet Take 1 tablet by mouth every 12 (twelve) hours.    VENTOLIN HFA 90 mcg/actuation inhaler INL 2 PFS PO QID PRF WHZ    [DISCONTINUED] ASMANEX  mcg/actuation HFAA INL 1 PUFF PO BID    [DISCONTINUED] clindamycin (CLEOCIN) 300 MG capsule Take 300 mg by mouth 3 (three) times daily.    [DISCONTINUED] fluticasone (FLONASE) 50 mcg/actuation nasal spray 2 sprays by Each Nare route once daily.    [DISCONTINUED] ipratropium-albuterol (COMBIVENT RESPIMAT)  mcg/actuation inhaler Inhale 1 puff into the lungs 4 (four) times daily.    [DISCONTINUED] norgestimate-ethinyl estradiol (MONONESSA, 28,) 0.25-35 mg-mcg per tablet Take 1 tablet by mouth once daily.     Family History     Problem Relation  (Age of Onset)    Allergies Father    Asthma Father    Breast cancer Maternal Grandfather, Maternal Aunt        Social History Main Topics    Smoking status: Former Smoker     Packs/day: 0.25     Quit date: 9/20/2013    Smokeless tobacco: Never Used    Alcohol use Yes    Drug use: No    Sexual activity: Yes     Partners: Male     Birth control/ protection: OCP      Comment:      Review of Systems   Constitutional: Positive for fatigue and unexpected weight change (About 10 pounds is 2 months). Negative for chills, diaphoresis and fever.   HENT: Negative for sore throat and trouble swallowing.    Eyes: Negative for photophobia and visual disturbance.   Respiratory: Positive for cough, shortness of breath and wheezing.    Cardiovascular: Positive for chest pain (Pain in Right Ribs). Negative for palpitations.   Gastrointestinal: Negative for abdominal pain, constipation, diarrhea, nausea and vomiting.   Endocrine: Negative for polydipsia and polyphagia.   Genitourinary: Negative for decreased urine volume, dysuria, hematuria and urgency.   Musculoskeletal: Negative for joint swelling, neck pain and neck stiffness.   Neurological: Positive for numbness (Right Lef Numbness). Negative for weakness and headaches.   Hematological: Positive for adenopathy (Right Axilla ).   Psychiatric/Behavioral: Negative for agitation, dysphoric mood and suicidal ideas. The patient is nervous/anxious.      Objective:     Vital Signs (Most Recent):  Temp: 98.4 °F (36.9 °C) (12/22/17 0355)  Pulse: (!) 121 (12/22/17 0355)  Resp: 16 (12/22/17 0355)  BP: 139/67 (12/22/17 0355)  SpO2: (!) 94 % (12/22/17 0330) Vital Signs (24h Range):  Temp:  [97.9 °F (36.6 °C)-98.9 °F (37.2 °C)] 98.4 °F (36.9 °C)  Pulse:  [114-126] 121  Resp:  [16-28] 16  SpO2:  [93 %-97 %] 94 %  BP: (123-155)/() 139/67     Weight: 48.6 kg (107 lb 2.3 oz)  Body mass index is 18.98 kg/m².    Physical Exam   Constitutional: She is oriented to person, place, and  time. She has a sickly appearance. She appears distressed. Nasal cannula in place.   HENT:   Head: Normocephalic and atraumatic.   Mouth/Throat: Oropharynx is clear and moist. No oropharyngeal exudate.   Eyes: Conjunctivae are normal. Pupils are equal, round, and reactive to light. No scleral icterus.   Neck: Neck supple.   Cardiovascular: Regular rhythm, normal heart sounds and intact distal pulses.  Tachycardia present.  Exam reveals no gallop and no friction rub.    No murmur heard.  Pulmonary/Chest: Accessory muscle usage present. Tachypnea noted. No respiratory distress. She has decreased breath sounds in the left middle field and the left lower field. She has no wheezes. She has no rales. She exhibits tenderness (Right Ribs Area).   Abdominal: Soft. She exhibits no distension. Bowel sounds are decreased. There is no tenderness. There is no rebound and no guarding.   Musculoskeletal: She exhibits no edema, tenderness or deformity.   Neurological: She is alert and oriented to person, place, and time. She exhibits normal muscle tone.   Skin: Skin is warm and dry. Capillary refill takes less than 2 seconds. No rash noted. She is not diaphoretic. There is pallor.   Psychiatric: Her behavior is normal. Her mood appears anxious. She exhibits a depressed mood.   Nursing note and vitals reviewed.        CRANIAL NERVES     CN III, IV, VI   Pupils are equal, round, and reactive to light.       Significant Labs:   CBC:   Recent Labs  Lab 12/21/17 2155 12/22/17  0448   WBC 19.52*  --    HGB 12.3 11.2*   HCT 37.1 35.2*   * 444*     CMP:   Recent Labs  Lab 12/21/17 2155 12/22/17  0448   * 135*   K 3.6 3.5   CL 98 100   CO2 23 23   * 139*   BUN 12 13   CREATININE 0.7 0.7   CALCIUM 9.0 8.1*   PROT 6.5  --    ALBUMIN 2.0*  --    BILITOT 0.4  --    ALKPHOS 148*  --    AST 47*  --    ALT 51*  --    ANIONGAP 14 12   EGFRNONAA >60 >60     Cardiac Markers: No results for input(s): CKMB, MYOGLOBIN, BNP,  TROPISTAT in the last 48 hours.  Coagulation:   Recent Labs  Lab 12/22/17  0448   INR 1.0   APTT 25.3     Urine Studies:   Recent Labs  Lab 12/21/17  2130   COLORU Yellow   APPEARANCEUA Clear   PHUR 5.0   SPECGRAV 1.015   PROTEINUA Negative   GLUCUA Negative   KETONESU Negative   BILIRUBINUA Negative   OCCULTUA Negative   NITRITE Negative   UROBILINOGEN 4.0-6.0*   LEUKOCYTESUR Negative   RBCUA 1   WBCUA 5   BACTERIA Many*   SQUAMEPITHEL 5     Status:  Final result    Ref Range & Units 12/21/17 2156   POC Preg Test, Ur Negative Negative     Acceptable  Yes         Significant Imaging: I have reviewed all pertinent imaging results/findings within the past 24 hours.   Imaging Results    None     Chest X-Ray  Narrative     Chest AP single view.  Comparison: None.    There is moderate to large volume left pleural effusion with volume loss of the left lower lobe.  Additional opacification and groundglass attenuation is seen within the aerated portion of the left upper lobe.  No prior studies are available for comparison.  Difficult to exclude potential underlying lesion.  Cardiac silhouette appears within normal limits in size however noting that the left heart border is obscured.  Mild right basilar atelectatic changes are seen.  Right lung is otherwise clear.  No significant right-sided effusion.  No pneumothorax.  No acute osseous abnormality identified.

## 2017-12-22 NOTE — PLAN OF CARE
Problem: Patient Care Overview  Goal: Plan of Care Review  Outcome: Ongoing (interventions implemented as appropriate)  The proper method of use, as well as anticipated side effects, of this aerosol treatment are discussed and demonstrated to the patient.

## 2017-12-22 NOTE — PLAN OF CARE
She will follow up outpatient with oncologist Dr. Vinnie Quiros.  She can be discharged home if she is weaned off nasal cannula, or may require home oxygen for the diagnosis of lung cancer.  At this time she is on too much supplemental oxygen to prescribe for home use.  Will determine tomorrow so she can be home for [facility].

## 2017-12-22 NOTE — SUBJECTIVE & OBJECTIVE
"Past Medical History:   Diagnosis Date    Allergy     Angio-edema     Asthma     Recurrent upper respiratory infection (URI)     Urticaria        Past Surgical History:   Procedure Laterality Date     SECTION      2 occurences       Review of patient's allergies indicates:   Allergen Reactions    Doxycycline Rash    Cat/feline products        Family History     Problem Relation (Age of Onset)    Allergies Father    Asthma Father    Breast cancer Maternal Grandfather, Maternal Aunt        Social History Main Topics    Smoking status: Former Smoker     Packs/day: 0.25     Quit date: 2013    Smokeless tobacco: Never Used    Alcohol use Yes    Drug use: No    Sexual activity: Yes     Partners: Male     Birth control/ protection: OCP      Comment:          Review of Systems   Constitutional: Positive for activity change, fatigue and unexpected weight change.        5lb weight loss in 2 months ("enough for coworkers to notice because I've always been very petite")  Usually a very high-energy person, now feels run-down constantly   HENT: Negative.    Eyes: Negative.    Respiratory: Positive for chest tightness and shortness of breath.    Cardiovascular: Negative.    Gastrointestinal: Negative.    Endocrine: Negative.    Genitourinary: Negative.    Musculoskeletal: Negative.    Skin: Negative.    Allergic/Immunologic: Negative.    Neurological: Negative.    Hematological: Negative.    Psychiatric/Behavioral: Negative.    All other systems reviewed and are negative.    Objective:     Vital Signs (Most Recent):  Temp: 98.1 °F (36.7 °C) (17 1157)  Pulse: (!) 112 (17 1218)  Resp: (!) 28 (17 1218)  BP: 121/79 (17 1157)  SpO2: 96 % (17 1218) Vital Signs (24h Range):  Temp:  [97.5 °F (36.4 °C)-98.9 °F (37.2 °C)] 98.1 °F (36.7 °C)  Pulse:  [109-126] 112  Resp:  [16-28] 28  SpO2:  [93 %-100 %] 96 %  BP: (121-155)/() 121/79     Weight: 48.6 kg (107 lb 2.3 oz)  Body " mass index is 18.98 kg/m².      Intake/Output Summary (Last 24 hours) at 12/22/17 1346  Last data filed at 12/22/17 0845   Gross per 24 hour   Intake                0 ml   Output             1.25 ml   Net            -1.25 ml       Physical Exam   Constitutional: She is oriented to person, place, and time. She appears well-developed and well-nourished. No distress.   Thin   Feels much better after thoracentesis   HENT:   Head: Normocephalic and atraumatic.   Cardiovascular: Regular rhythm, normal heart sounds and intact distal pulses.    Tachy   Pulmonary/Chest: Effort normal. No respiratory distress. She has no wheezes.   Minimal basilar crackles, left >right  96% on 4L NC  Pain with respirations   Abdominal: Soft. Bowel sounds are normal.   Neurological: She is alert and oriented to person, place, and time.   Skin: Skin is warm and dry. She is not diaphoretic.   Psychiatric: She has a normal mood and affect.   Nursing note and vitals reviewed.      Vents:       Lines/Drains/Airways     Peripheral Intravenous Line                 Peripheral IV - Single Lumen 12/21/17 2112 Left Antecubital less than 1 day                Significant Labs:    CBC/Anemia Profile:    Recent Labs  Lab 12/21/17 2155 12/22/17  0448   WBC 19.52* 19.63*   HGB 12.3 11.2*   HCT 37.1 35.2*   * 444*   MCV 88 90   RDW 13.0 12.9        Chemistries:    Recent Labs  Lab 12/21/17 2155 12/22/17  0448   * 135*   K 3.6 3.5   CL 98 100   CO2 23 23   BUN 12 13   CREATININE 0.7 0.7   CALCIUM 9.0 8.1*   ALBUMIN 2.0*  --    PROT 6.5  --    BILITOT 0.4  --    ALKPHOS 148*  --    ALT 51*  --    AST 47*  --    MG  --  1.8   PHOS  --  2.8       All pertinent labs within the past 24 hours have been reviewed.    Significant Imaging:   I have reviewed all pertinent imaging results/findings within the past 24 hours.

## 2017-12-22 NOTE — SUBJECTIVE & OBJECTIVE
Oncology Treatment Plan:   [No treatment plan]    Medications:  Continuous Infusions:  Scheduled Meds:   albuterol-ipratropium 2.5mg-0.5mg/3mL  3 mL Nebulization Q4H    buPROPion  300 mg Oral Daily    cefTRIAXone (ROCEPHIN) IVPB  1 g Intravenous Q12H    enoxaparin  40 mg Subcutaneous Daily     PRN Meds:hydrocodone-acetaminophen 7.5-325mg, ketorolac, LORazepam, ondansetron, sodium chloride 0.9%     Review of patient's allergies indicates:   Allergen Reactions    Doxycycline Rash    Cat/feline products         Past Medical History:   Diagnosis Date    Allergy     Angio-edema     Asthma     Recurrent upper respiratory infection (URI)     Urticaria      Past Surgical History:   Procedure Laterality Date     SECTION      2 occurences     Family History     Problem Relation (Age of Onset)    Allergies Father    Asthma Father    Breast cancer Maternal Grandfather, Maternal Aunt        Social History Main Topics    Smoking status: Former Smoker     Packs/day: 0.25     Quit date: 2013    Smokeless tobacco: Never Used    Alcohol use Yes    Drug use: No    Sexual activity: Yes     Partners: Male     Birth control/ protection: OCP      Comment:        Review of Systems   Constitutional: Positive for activity change, appetite change, fatigue and unexpected weight change. Negative for fever.   HENT: Negative for mouth sores and nosebleeds.    Eyes: Negative for photophobia and pain.   Respiratory: Positive for shortness of breath. Negative for wheezing.    Cardiovascular: Negative for chest pain and palpitations.   Gastrointestinal: Negative for abdominal pain, nausea and vomiting.   Genitourinary: Negative for flank pain and hematuria.   Musculoskeletal: Negative for neck pain and neck stiffness.   Skin: Negative for rash and wound.   Neurological: Negative for seizures and syncope.   Hematological: Positive for adenopathy. Does not bruise/bleed easily.   Psychiatric/Behavioral: Negative for  behavioral problems and confusion. The patient is nervous/anxious.    All other systems reviewed and are negative.    Objective:     Vital Signs (Most Recent):  Temp: 98.7 °F (37.1 °C) (12/22/17 1522)  Pulse: (!) 116 (12/22/17 1522)  Resp: (!) 22 (12/22/17 1522)  BP: 110/63 (12/22/17 1522)  SpO2: 96 % (12/22/17 1218) Vital Signs (24h Range):  Temp:  [97.5 °F (36.4 °C)-98.9 °F (37.2 °C)] 98.7 °F (37.1 °C)  Pulse:  [109-126] 116  Resp:  [16-28] 22  SpO2:  [93 %-100 %] 96 %  BP: (110-155)/() 110/63     Weight: 48.6 kg (107 lb 2.3 oz)  Body mass index is 18.98 kg/m².  Body surface area is 1.47 meters squared.      Intake/Output Summary (Last 24 hours) at 12/22/17 1632  Last data filed at 12/22/17 0845   Gross per 24 hour   Intake                0 ml   Output             1.25 ml   Net            -1.25 ml       Physical Exam   Constitutional: She is cooperative. She does not appear ill. No distress.   HENT:   Head: Head is without laceration, without right periorbital erythema and without left periorbital erythema.   Nose: No epistaxis.   Mouth/Throat: Oropharynx is clear and moist. No oropharyngeal exudate. No tonsillar exudate.   Eyes: Conjunctivae are normal.   Neck: Neck supple. No thyroid mass and no thyromegaly present.   Cardiovascular: Normal rate and regular rhythm.  Exam reveals no friction rub.    Pulmonary/Chest: Breath sounds normal. No accessory muscle usage or stridor. No tachypnea. No respiratory distress. Chest wall is not dull to percussion. She exhibits no tenderness.   Abdominal: Soft. She exhibits no distension, no ascites and no mass. There is no hepatosplenomegaly.   Musculoskeletal: She exhibits no edema.   Lymphadenopathy:        Head (right side): No submental and no submandibular adenopathy present.        Head (left side): No submental and no submandibular adenopathy present.     She has no cervical adenopathy.     She has no axillary adenopathy.   Neurological: She is alert. She has  normal strength. No cranial nerve deficit.   Skin: No bruising, no petechiae and no rash noted. She is not diaphoretic.        Psychiatric: Her behavior is normal. Thought content normal. Cognition and memory are normal. She does not exhibit a depressed mood.   Vitals reviewed.      Significant Labs:   All pertinent labs from the last 24 hours have been reviewed.    Diagnostic Results:  I have reviewed all pertinent imaging results/findings within the past 24 hours.

## 2017-12-22 NOTE — ASSESSMENT & PLAN NOTE
· S/p thoracentesis by IR with 1.2L removed  · Pt feels much better after fluid removed, but does have pain with inspirations - recommend treating her with PRN pain medication  · Awaiting studies on pleural fluid, specifically cytology  · Post-procedure CXR concerning for mass - chest CT ordered to further evaluate mass and/or post-obstructive disease

## 2017-12-22 NOTE — SIGNIFICANT EVENT
"· Attempted to contact pulmonologist at [facility], Dr. Lilia Stauffer ([phone]).   · Dr. Stauffer is out of the office  ·  for Dr. 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 No other information offered. She said she would fax over Dr. Stauffer's last note. I gave her the 5th floor fax number but nothing has yet  been received.  · Given the unsubstantiated report, I did NOT share it with the patient.   · Ms. Falcon does NOT know that the axillary LN biopsy was positive for cancer.    Mathew Manzano MD  Fellow, [facility]monary [facility] Critical [facility]  Cell [phone]    "

## 2017-12-22 NOTE — CONSULTS
"[facility]  Pulmonology  Consult Note    Patient Name: Yen Falcon  MRN: [id number]  Admission Date: 12/22/2017  Hospital Length of Stay: 0 days  Code Status: Full Code  Attending Physician: Brian He MD  Primary Care Provider: Lauren Goodman MD   Principal Problem: Pleural effusion on left    Pulmonology  Consult performed by: SPENCER CARABALLO  Consult ordered by: EKTA PUENTES        Subjective:     HPI:  Ms. Falcon is a 43yo female with a PMHx of asthma and 10 pack-year smoking history. Per patient, her symptoms started approximately 1-1.5 months prior to this admission. She was in her usual state of health when she felt she was having an asthma attack. Rarely, she experiences asthma that are quickly alleviated with albuterol, but this attack did not jose cruz. She went to the [facility] ED and was treated with PO abx and discharged from ED with a diagnosis of community-acquired pneumonia. She continued to experience symptoms of SOB and visited her PCP approximately 1 month ago. CXR, per patient, "showed fluid in the lung". She was prescribed another antibiotic. In passing, she mentioned to her PCP a small bump in her right axilla and another small bump on her right chest wall. PCP referred her to Dr. Lilia Stauffer, pulmonology. After seeing Dr. Stauffer, she was sent for biopsy of right axillary node on 12/17/17. Pt states that at the time of her biopsy she was quite SOB and had difficulty laying flat. On day of admission, 12/21/17, pt simply had persistent symptoms and felt that her issues weren't being addressed at , so she decided to go to [facility]. [facility] arranged transfer to this facility. Upon admission, CXR showed large left pleural effusion. 1.2L successfully drained by IR with results pending. +5lb weight loss over past 2 months ("enough for my coworkers to notice).    Pt smoked ~10 cigarettes per day for 20 years - stopped 4 years ago. 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 No personal hx of malignancy. No EtOH, no IVDU. Regarding infection risk, born and raised in [place], no incarceration, no homelessness, no known TB contacts. +Flu shot this year.    Past Medical History:   Diagnosis Date    Allergy     Angio-edema     Asthma     Recurrent upper respiratory infection (URI)     Urticaria        Past Surgical History:   Procedure Laterality Date     SECTION      2 occurences       Review of patient's allergies indicates:   Allergen Reactions    Doxycycline Rash    Cat/feline products        Family History     Problem Relation (Age of Onset)    Allergies Father    Asthma Father    Breast cancer Maternal Grandfather, Maternal Aunt        Social History Main Topics    Smoking status: Former Smoker     Packs/day: 0.25     Quit date: 2013    Smokeless tobacco: Never Used    Alcohol use Yes    Drug use: No    Sexual activity: Yes     Partners: Male     Birth control/ protection: OCP      Comment:          Review of Systems   Constitutional: Positive for activity change, fatigue and unexpected weight change.        5lb weight loss in 2 months ("enough for coworkers to notice because I've always been very petite")  Usually a very high-energy person, now feels run-down constantly   HENT: Negative.    Eyes: Negative.    Respiratory: Positive for chest tightness and shortness of breath.    Cardiovascular: Negative.    Gastrointestinal: Negative.    Endocrine: Negative.    Genitourinary: Negative.    Musculoskeletal: Negative.    Skin: Negative.    Allergic/Immunologic: Negative.    Neurological: Negative.    Hematological: Negative.    Psychiatric/Behavioral: Negative.    All other systems reviewed and are negative.    Objective:     Vital Signs (Most Recent):  Temp: 98.1 °F (36.7 °C) (17 1157)  Pulse: (!) 112 (17 1218)  Resp: (!) 28 (17 1218)  BP: 121/79 (17 1157)  SpO2: 96 % (17 1218) Vital Signs (24h Range):  Temp:  " [97.5 °F (36.4 °C)-98.9 °F (37.2 °C)] 98.1 °F (36.7 °C)  Pulse:  [109-126] 112  Resp:  [16-28] 28  SpO2:  [93 %-100 %] 96 %  BP: (121-155)/() 121/79     Weight: 48.6 kg (107 lb 2.3 oz)  Body mass index is 18.98 kg/m².      Intake/Output Summary (Last 24 hours) at 12/22/17 1346  Last data filed at 12/22/17 0845   Gross per 24 hour   Intake                0 ml   Output             1.25 ml   Net            -1.25 ml       Physical Exam   Constitutional: She is oriented to person, place, and time. She appears well-developed and well-nourished. No distress.   Thin   Feels much better after thoracentesis   HENT:   Head: Normocephalic and atraumatic.   Cardiovascular: Regular rhythm, normal heart sounds and intact distal pulses.    Tachy   Pulmonary/Chest: Effort normal. No respiratory distress. She has no wheezes.   Minimal basilar crackles, left >right  96% on 4L NC  Pain with respirations   Abdominal: Soft. Bowel sounds are normal.   Neurological: She is alert and oriented to person, place, and time.   Skin: Skin is warm and dry. She is not diaphoretic.   Psychiatric: She has a normal mood and affect.   Nursing note and vitals reviewed.      Vents:       Lines/Drains/Airways     Peripheral Intravenous Line                 Peripheral IV - Single Lumen 12/21/17 2112 Left Antecubital less than 1 day                Significant Labs:    CBC/Anemia Profile:    Recent Labs  Lab 12/21/17 2155 12/22/17  0448   WBC 19.52* 19.63*   HGB 12.3 11.2*   HCT 37.1 35.2*   * 444*   MCV 88 90   RDW 13.0 12.9        Chemistries:    Recent Labs  Lab 12/21/17 2155 12/22/17  0448   * 135*   K 3.6 3.5   CL 98 100   CO2 23 23   BUN 12 13   CREATININE 0.7 0.7   CALCIUM 9.0 8.1*   ALBUMIN 2.0*  --    PROT 6.5  --    BILITOT 0.4  --    ALKPHOS 148*  --    ALT 51*  --    AST 47*  --    MG  --  1.8   PHOS  --  2.8       All pertinent labs within the past 24 hours have been reviewed.    Significant Imaging:   I have reviewed all  pertinent imaging results/findings within the past 24 hours.    Assessment/Plan:     * Pleural effusion on left    · S/p thoracentesis by IR with 1.2L removed  · Pt feels much better after fluid removed, but does have pain with inspirations - recommend treating her with PRN pain medication  · Awaiting studies on pleural fluid, specifically cytology  · Post-procedure CXR concerning for mass - chest CT ordered to further evaluate mass and/or post-obstructive disease        Acute hypoxemic respiratory failure    · Oxygen reduced from 8L/min => 4L/min, sats stable at 97%  · Her hypoxemia is almost certainly secondary to her effusion which is now drained.  · Recommend stopping steroids              Thank you for your consult. I will follow-up with patient. Please contact us if you have any additional questions.     Branden Manzano MD  Pulmonology  [facility]    Pt seen and examined with Pulmonary/Critical Care team and this note reviewed and validated with the following additional comments:    Pt is subjectively improved since her thoracentesis but she remains SOB on activity due to her large tumor burden.  She has my cell phone number so if she goes home for [place] she can call as needed for recurrence of her effusion. I would send her home with [facility] as she is at high risk for VTE.    Иван Stahl MD  Phone [phone]

## 2017-12-22 NOTE — ASSESSMENT & PLAN NOTE
· Oxygen reduced from 8L/min => 4L/min, sats stable at 97%  · Her hypoxemia is almost certainly secondary to her effusion which is now drained.  · Recommend stopping steroids

## 2017-12-22 NOTE — PROGRESS NOTES
Patient arrived to unit via EMS, vitals stable. Notified Lucretia that patient has arrived complaining of SOB, anxiety and pain. Orders received.

## 2017-12-22 NOTE — H&P
[facility] Hospital Medicine  History & Physical    Patient Name: Yen Falcon  MRN: [id number]  Admission Date: 12/22/2017  Attending Physician: Brian He MD   Primary Care Provider: Lauren Goodman MD         Patient information was obtained from patient, parent and ER records.     Subjective:     Principal Problem:Pleural effusion on left    Chief Complaint: No chief complaint on file.       HPI: Yen Falcon is  42 y.o. F (LMP: 11/26/17) with a PMHx of recurrent upper respiratory infection and asthma diagnosed 4 years ago.  She is a former smoker, but quit when she was diagnosed with asthma.  She presented to [facility] ED via EMS c/o progressive SOB with associated bright red hemoptysis beginning 2 days ago. Pt reports that she was used a nebulizer at home with no relief. Per EMS, RA SpO2 80% on room air which increased to 97% after breathing treatment and oxygen.  She states that she has also had occasional right leg numbness.     She was admitted to [facility] on 10/30/17 with pneumonia in the L lung. She was told that she had fluid in her left lung as well. She never fully recovered and was treated by her PCP with more antibiotics and oral steroids. She noticed a lump under right arm and she had an axillary biopsy performed at [facility] by Dr. Lilia Stauffer 3 days ago and was told she had some form of cancer. However, pt states that the source and type of cancer is unknown. She has not received any treatment at [facility] and wishes to switch her oncology care over to [facility].  Pt is not on any steroids. Pt denies fever, chills, N/V/D, and any other symptoms. Her Chest X-Ray shows a moderate to large volume left pleural effusion with volume loss of the left lower lobe.  Additional opacification and groundglass attenuation is seen within the aerated portion of the left upper lobe.  No prior studies are available for comparison.   Her WBC are 19.52, Platelets  476, Alk Phos 148, ALT 47, AST 51.  A chest CT was attempted, but aborted because she could not lie flat due to respiratory distress.  She was treated with 1 liter of normal saline and several breathing treatments.  [facility] was contacted for transfer for Pulmonary and Hem/Onc Consults.      Upon arrival to the unit, she is extremely anxious and short of breath, unable to lay back at all. She was given solumedrol 125 mg IV, Duoneb and oral ativan and is more comfortable.        Past Medical History:   Diagnosis Date    Allergy     Angio-edema     Asthma     Recurrent upper respiratory infection (URI)     Urticaria        Past Surgical History:   Procedure Laterality Date     SECTION      2 occurences       Review of patient's allergies indicates:   Allergen Reactions    Doxycycline Rash    Cat/feline products        Current Facility-Administered Medications on File Prior to Encounter   Medication    [COMPLETED] albuterol-ipratropium 2.5mg-0.5mg/3mL nebulizer solution 3 mL    [COMPLETED] sodium chloride 0.9% bolus 1,000 mL    [DISCONTINUED] albuterol-ipratropium 2.5mg-0.5mg/3mL nebulizer solution 15 mL    [DISCONTINUED] albuterol-ipratropium 2.5mg-0.5mg/3mL nebulizer solution 15 mL     Current Outpatient Prescriptions on File Prior to Encounter   Medication Sig    ascorbic acid (VITAMIN C) 500 MG tablet Take 500 mg by mouth once daily.    buPROPion (WELLBUTRIN XL) 300 MG 24 hr tablet Take 1 tablet (300 mg total) by mouth once daily.    diphenhydrAMINE (SOMINEX) 25 mg tablet Take 25 mg by mouth nightly as needed.    multivitamin (ONE DAILY MULTIVITAMIN) per tablet Take 1 tablet by mouth once daily.    pseudoephedrine-guaifenesin  mg (MUCINEX D)  mg per tablet Take 1 tablet by mouth every 12 (twelve) hours.    VENTOLIN HFA 90 mcg/actuation inhaler INL 2 PFS PO QID PRF WHZ    [DISCONTINUED] ASMANEX  mcg/actuation HFAA INL 1 PUFF PO BID    [DISCONTINUED]  clindamycin (CLEOCIN) 300 MG capsule Take 300 mg by mouth 3 (three) times daily.    [DISCONTINUED] fluticasone (FLONASE) 50 mcg/actuation nasal spray 2 sprays by Each Nare route once daily.    [DISCONTINUED] ipratropium-albuterol (COMBIVENT RESPIMAT)  mcg/actuation inhaler Inhale 1 puff into the lungs 4 (four) times daily.    [DISCONTINUED] norgestimate-ethinyl estradiol (MONONESSA, 28,) 0.25-35 mg-mcg per tablet Take 1 tablet by mouth once daily.     Family History     Problem Relation (Age of Onset)    Allergies Father    Asthma Father    Breast cancer Maternal Grandfather, Maternal Aunt        Social History Main Topics    Smoking status: Former Smoker     Packs/day: 0.25     Quit date: 9/20/2013    Smokeless tobacco: Never Used    Alcohol use Yes    Drug use: No    Sexual activity: Yes     Partners: Male     Birth control/ protection: OCP      Comment:      Review of Systems   Constitutional: Positive for fatigue and unexpected weight change (About 10 pounds is 2 months). Negative for chills, diaphoresis and fever.   HENT: Negative for sore throat and trouble swallowing.    Eyes: Negative for photophobia and visual disturbance.   Respiratory: Positive for cough, shortness of breath and wheezing.    Cardiovascular: Positive for chest pain (Pain in Right Ribs). Negative for palpitations.   Gastrointestinal: Negative for abdominal pain, constipation, diarrhea, nausea and vomiting.   Endocrine: Negative for polydipsia and polyphagia.   Genitourinary: Negative for decreased urine volume, dysuria, hematuria and urgency.   Musculoskeletal: Negative for joint swelling, neck pain and neck stiffness.   Neurological: Positive for numbness (Right Lef Numbness). Negative for weakness and headaches.   Hematological: Positive for adenopathy (Right Axilla ).   Psychiatric/Behavioral: Negative for agitation, dysphoric mood and suicidal ideas. The patient is nervous/anxious.      Objective:     Vital Signs  (Most Recent):  Temp: 98.4 °F (36.9 °C) (12/22/17 0355)  Pulse: (!) 121 (12/22/17 0355)  Resp: 16 (12/22/17 0355)  BP: 139/67 (12/22/17 0355)  SpO2: (!) 94 % (12/22/17 0330) Vital Signs (24h Range):  Temp:  [97.9 °F (36.6 °C)-98.9 °F (37.2 °C)] 98.4 °F (36.9 °C)  Pulse:  [114-126] 121  Resp:  [16-28] 16  SpO2:  [93 %-97 %] 94 %  BP: (123-155)/() 139/67     Weight: 48.6 kg (107 lb 2.3 oz)  Body mass index is 18.98 kg/m².    Physical Exam   Constitutional: She is oriented to person, place, and time. She has a sickly appearance. She appears distressed. Nasal cannula in place.   HENT:   Head: Normocephalic and atraumatic.   Mouth/Throat: Oropharynx is clear and moist. No oropharyngeal exudate.   Eyes: Conjunctivae are normal. Pupils are equal, round, and reactive to light. No scleral icterus.   Neck: Neck supple.   Cardiovascular: Regular rhythm, normal heart sounds and intact distal pulses.  Tachycardia present.  Exam reveals no gallop and no friction rub.    No murmur heard.  Pulmonary/Chest: Accessory muscle usage present. Tachypnea noted. No respiratory distress. She has decreased breath sounds in the left middle field and the left lower field. She has no wheezes. She has no rales. She exhibits tenderness (Right Ribs Area).   Abdominal: Soft. She exhibits no distension. Bowel sounds are decreased. There is no tenderness. There is no rebound and no guarding.   Musculoskeletal: She exhibits no edema, tenderness or deformity.   Neurological: She is alert and oriented to person, place, and time. She exhibits normal muscle tone.   Skin: Skin is warm and dry. Capillary refill takes less than 2 seconds. No rash noted. She is not diaphoretic. There is pallor.   Psychiatric: Her behavior is normal. Her mood appears anxious. She exhibits a depressed mood.   Nursing note and vitals reviewed.        CRANIAL NERVES     CN III, IV, VI   Pupils are equal, round, and reactive to light.       Significant Labs:   CBC:   Recent  Labs  Lab 12/21/17 2155 12/22/17 0448   WBC 19.52*  --    HGB 12.3 11.2*   HCT 37.1 35.2*   * 444*     CMP:   Recent Labs  Lab 12/21/17 2155 12/22/17 0448   * 135*   K 3.6 3.5   CL 98 100   CO2 23 23   * 139*   BUN 12 13   CREATININE 0.7 0.7   CALCIUM 9.0 8.1*   PROT 6.5  --    ALBUMIN 2.0*  --    BILITOT 0.4  --    ALKPHOS 148*  --    AST 47*  --    ALT 51*  --    ANIONGAP 14 12   EGFRNONAA >60 >60     Cardiac Markers: No results for input(s): CKMB, MYOGLOBIN, BNP, TROPISTAT in the last 48 hours.  Coagulation:   Recent Labs  Lab 12/22/17 0448   INR 1.0   APTT 25.3     Urine Studies:   Recent Labs  Lab 12/21/17 2130   COLORU Yellow   APPEARANCEUA Clear   PHUR 5.0   SPECGRAV 1.015   PROTEINUA Negative   GLUCUA Negative   KETONESU Negative   BILIRUBINUA Negative   OCCULTUA Negative   NITRITE Negative   UROBILINOGEN 4.0-6.0*   LEUKOCYTESUR Negative   RBCUA 1   WBCUA 5   BACTERIA Many*   SQUAMEPITHEL 5     Status:  Final result    Ref Range & Units 12/21/17 2156   POC Preg Test, Ur Negative Negative     Acceptable  Yes         Significant Imaging: I have reviewed all pertinent imaging results/findings within the past 24 hours.   Imaging Results    None     Chest X-Ray  Narrative     Chest AP single view.  Comparison: None.    There is moderate to large volume left pleural effusion with volume loss of the left lower lobe.  Additional opacification and groundglass attenuation is seen within the aerated portion of the left upper lobe.  No prior studies are available for comparison.  Difficult to exclude potential underlying lesion.  Cardiac silhouette appears within normal limits in size however noting that the left heart border is obscured.  Mild right basilar atelectatic changes are seen.  Right lung is otherwise clear.  No significant right-sided effusion.  No pneumothorax.  No acute osseous abnormality identified.           Assessment/Plan:     * Pleural effusion on left    42  year old former smoker with a recent history of Left Lung Pneumonia and Left Pleural Effusion, Now with Right Axilla Adenopathy and + Biopsy with severe shortness of breath, hemoptysis and chest x-ray showing moderate to large volume left pleural effusion with volume loss of the left lower lobe.  Additional opacification and groundglass attenuation is seen within the aerated portion of the left upper lobe.  Was unable to tolerate CT due to respiratory distress when lying flat.  --IR for Thoracentesis  --Pulmonary Consult  --Hematology Consult            Anxiety    Continue Home bupropion 300 mg daily and give Ativan 1 mg every 6 hours prn          Asthma    Hypoxemic Respiratory Failure  Duoneb Treatments every 4 hours  Solumedrol 125 mg IV then 8- mg TIB  HiFLo Nasal Cannula Oxygen, When for Comfort and SaO2 > 93%          VTE Risk Mitigation         Ordered     enoxaparin injection 40 mg  Daily     Route:  Subcutaneous        12/22/17 0230     Medium Risk of VTE  Once      12/22/17 0230             Macie Boykin NP  Department of Hospital Medicine   [facility]

## 2017-12-22 NOTE — HPI
Yen Falcon is  42 y.o.  woman (LMP: 11/26/17) with a history of recurrent upper respiratory infection, asthma diagnosed in 2013, smoking that she quit when she was diagnosed with asthma, a left pleural effusion since 10/3/17, and a malignant right axillary lymph node.  She is a former smoker, but quit when she was diagnosed with asthma.  She lives in [place], [place].            She presented to [facility] ED via EMS on 12/21/17 with progressive shortness of breath and hypoxia, and associated bright red hemoptysis beginning 2 days prior.  She was using a nebulizer at home with no relief.  Per EMS, room air oxygen saturation was 80% on room air which increased to 97% after breathing treatment and oxygen.  She stated that she has also had occasional right leg numbness.  She had been admitted to [facility] on 10/30/17 with pneumonia in the L lung. She was told that she had fluid in her left lung as well. She never fully recovered and was treated by her primary care physician with more antibiotics and oral steroids.  She noticed a lump under right arm and she had an axillary biopsy performed at [facility] by Dr. Lilia Stauffer 3 days ago and was told she had some form of cancer.  However, the source and type of cancer was still not determined.  She had not yet received any treatment at [facility] and wished to switch her oncology care over to [facility].  She was not on any steroids.  She denied fever, chills, N/V/D, and any other symptoms.  Her chest X-ray showed a moderate to large volume left pleural effusion with volume loss of the left lower lobe with additional opacification and groundglass attenuation within the aerated portion of the left upper lobe.  Her family felt it looked similar to her last X-ray at [facility].  Her WBC count was 19,520, platelets 476, alk phos 148, ALT 47, AST 51.  A chest CT was attempted, but aborted because she could not  lie flat due to respiratory distress.  She was treated with 1 liter of normal saline and several breathing treatments.  [facility] was contacted at [facility] for transfer for Pulmonology and Oncology evaluations.           Upon arrival to [facility], she was extremely anxious and short of breath, unable to lay back at all.  She was given methylprednisolone 125 mg IV, albuterol-ipratropium nebulizer treatment, and oral lorazepam, and was more comfortable.

## 2017-12-22 NOTE — SUBJECTIVE & OBJECTIVE
Interval History: Ready to go home.  Requests medication for panic attacks.    Review of Systems   Constitutional: Negative for chills and fever.   Gastrointestinal: Negative for nausea and vomiting.     Objective:     Vital Signs (Most Recent):  Temp: 97.5 °F (36.4 °C) (12/22/17 0923)  Pulse: (!) 114 (12/22/17 0923)  Resp: (!) 26 (12/22/17 0923)  BP: 129/69 (12/22/17 0923)  SpO2: 100 % (12/22/17 0903) Vital Signs (24h Range):  Temp:  [97.5 °F (36.4 °C)-98.9 °F (37.2 °C)] 97.5 °F (36.4 °C)  Pulse:  [109-126] 114  Resp:  [16-28] 26  SpO2:  [93 %-100 %] 100 %  BP: (121-155)/() 129/69     Weight: 48.6 kg (107 lb 2.3 oz)  Body mass index is 18.98 kg/m².    Intake/Output Summary (Last 24 hours) at 12/22/17 1015  Last data filed at 12/22/17 0845   Gross per 24 hour   Intake                0 ml   Output             1.25 ml   Net            -1.25 ml      Physical Exam   Constitutional: She is oriented to person, place, and time. She appears well-developed. She appears cachectic. Nasal cannula in place.   Pulmonary/Chest: Effort normal. No respiratory distress. She has decreased breath sounds in the left lower field.   Neurological: She is alert and oriented to person, place, and time.   Psychiatric: She has a normal mood and affect.   Nursing note and vitals reviewed.      Significant Labs: All pertinent labs within the past 24 hours have been reviewed.    Significant Imaging: I have reviewed all pertinent imaging results/findings within the past 24 hours.   CT Chest Without Contrast 12/22/17: The structures at the base of the neck are unremarkable. The thoracic aorta maintains normal caliber, contour, and course without significant atherosclerotic calcification.    The heart is not enlarged and there is no evidence of pericardial effusion. There is     There is an ill-defined left suprahilar mass with internal low density. Margins are difficult to definitively delineate, but this measures approximately 5.0 x 3.9 cm  axial diameter. This encases the left main stem and particularly the left upper lobe bronchus. There is left upper lobe postobstructive consolidation. There is also suspected encasement of the left main pulmonary artery, although this is difficult to definitively evaluate without IV contrast. There are also scattered airspace consult throughout the remaining visualized lungs. This particularly evident in the left lower lobe. There are small bilateral pleural effusions.  Extensive mediastinal adenopathy. For reference, there is a 1.6 cm subcarinal lymph node and a 1.1 cm prevascular lymph node there is a 1.1 cm right axillary lymph node.  The osseous structures are unremarkable. Limited views of the abdomen demonstrate nothing unusual.  Impression:  There is a large left suprahilar mass which appears to encase the left main stem and upper lobe bronchi as well as the left main pulmonary artery. This is concerning for a primary lung malignancy.  Mediastinal and right axillary adenopathy concerning for metastatic disease.  Obstructive in the left upper lobe with scattered consolidations throughout the remaining lungs, likely infectious. Small bilateral pleural effusions.

## 2017-12-22 NOTE — ED NOTES
Patient will be transferred to [facility]. 15ML duoneb ordered before transfer. Patient has been placed on 9ml duoneb before transfer. 6 ml duoneb given to acadian to be given in route due to patient in slight distress. Patient heart rate 121. SP02 94% on nasal cannula 4 lpm. sp02 on aerosol treatment 96%. Patient has wet cough. Cough production was bloody secretions. BBS EQUAL. Anterior diminish/slight coarse. Posterior OJ.LLL Diminished few rales. /85.

## 2017-12-22 NOTE — ASSESSMENT & PLAN NOTE
42 year old former smoker with a recent history of Left Lung Pneumonia and Left Pleural Effusion, Now with Right Axilla Adenopathy and + Biopsy with severe shortness of breath, hemoptysis and chest x-ray showing moderate to large volume left pleural effusion with volume loss of the left lower lobe.  Additional opacification and groundglass attenuation is seen within the aerated portion of the left upper lobe.  Was unable to tolerate CT due to respiratory distress when lying flat.  --IR for Thoracentesis  --Pulmonary Consult  --Hematology Consult

## 2017-12-22 NOTE — ED PROVIDER NOTES
Encounter Date: 2017    SCRIBE #1 NOTE: I, Aarti Arias, am scribing for, and in the presence of, Henrry Joseph MD.       History     Chief Complaint   Patient presents with    Shortness of Breath     Patient states that she has been SOB x3 days. Patient states that she has a hx of asthma. Patient had pneumonia on .      CC: Shortness of Breath    HPI: 42 y.o. F (LMP: ) with a PMHx of angio-edema, recurrent URI, and asthma presents to the ED via EMS c/o progressive SOB with associated hemoptysis beginning 2 days ago. Pt reports that she was used a nebulizer at home with no relief. Per EMS, RA SpO2 88% which increased to 97% after a breathing tx. Pt was dxed with pneumonia in the L lung about 1 month ago. Pt had an axillary biopsy performed at  3 days ago which showed some form of cancer. However, pt states that the source and type of cancer is unknown. She has not received any tx at [facility] and wishes to switch her oncology care over to [facility]. Pt is a former smoker. Pt reports she developed asthma about 4 years ago after quitting smoking. Pt is not on any steroids. Pt denies fever, chills, N/V/D, and any other symptoms.       The history is provided by the patient.     Review of patient's allergies indicates:   Allergen Reactions    Doxycycline Rash    Cat/feline products      Past Medical History:   Diagnosis Date    Allergy     Angio-edema     Asthma     Recurrent upper respiratory infection (URI)     Urticaria      Past Surgical History:   Procedure Laterality Date     SECTION      2 occurences     Family History   Problem Relation Age of Onset    Allergies Father     Asthma Father     Breast cancer Maternal Grandfather     Breast cancer Maternal Aunt     Colon cancer Neg Hx     Ovarian cancer Neg Hx      Social History   Substance Use Topics    Smoking status: Former Smoker     Packs/day: 0.25     Quit date: 2013    Smokeless tobacco: Never Used    Alcohol  use Yes     Review of Systems   Constitutional: Negative for chills and fever.   HENT: Negative for ear pain and trouble swallowing.    Eyes: Negative for pain.   Respiratory: Positive for cough (hemoptysis) and shortness of breath.    Cardiovascular: Negative for chest pain.   Gastrointestinal: Negative for abdominal pain, diarrhea, nausea and vomiting.   Genitourinary: Negative for difficulty urinating and dysuria.   Musculoskeletal: Negative for back pain and neck pain.   Skin: Negative for rash.   Neurological: Negative for headaches.       Physical Exam     Initial Vitals [12/21/17 2111]   BP Pulse Resp Temp SpO2   124/70 (!) 116 18 98.9 °F (37.2 °C) 97 %      MAP       88         Physical Exam    Nursing note and vitals reviewed.  Constitutional: She appears well-developed and well-nourished.  Non-toxic appearance. She does not appear ill.   HENT:   Head: Normocephalic and atraumatic.   Eyes: EOM are normal.   Neck: Neck supple.   Cardiovascular: Normal rate, regular rhythm and normal heart sounds.   Pulmonary/Chest:   Pt has increased work of breathing. Inspiratory and expiratory wheezing with mild retractions.    Abdominal: Soft. Normal appearance and bowel sounds are normal. She exhibits no distension. There is no tenderness.   Musculoskeletal: Normal range of motion.   Neurological: She is alert and oriented to person, place, and time.   Skin: Skin is warm and dry.   Psychiatric: Her mood appears anxious.         ED Course   Procedures  Labs Reviewed   COMPREHENSIVE METABOLIC PANEL - Abnormal; Notable for the following:        Result Value    Sodium 135 (*)     Glucose 119 (*)     Albumin 2.0 (*)     Alkaline Phosphatase 148 (*)     AST 47 (*)     ALT 51 (*)     All other components within normal limits   CBC W/ AUTO DIFFERENTIAL - Abnormal; Notable for the following:     WBC 19.52 (*)     Platelets 476 (*)     Gran # 15.0 (*)     Mono # 1.3 (*)     Eos # 0.9 (*)     Gran% 77.0 (*)     Lymph% 11.9 (*)      All other components within normal limits   URINALYSIS - Abnormal; Notable for the following:     Urobilinogen, UA 4.0-6.0 (*)     All other components within normal limits   URINALYSIS MICROSCOPIC - Abnormal; Notable for the following:     WBC Clumps, UA Occasional (*)     Bacteria, UA Many (*)     All other components within normal limits   TROPONIN I   POCT URINE PREGNANCY     EKG Readings: (Independently Interpreted)   NSTEMI. Sinus tachycardia with rate of 116. Possible L atrial enlargement. Incomplete RBBB.        X-Rays:   Independently Interpreted Readings:   Chest X-Ray: There is moderate to large volume left pleural effusion with volume loss of the left lower lobe.  Additional opacification and groundglass attenuation is seen within the aerated portion of the left upper lobe.  No prior studies are available for comparison.      Medical Decision Making:   Clinical Tests:   Lab Tests: Ordered  The following lab test(s) were unremarkable: UPT  Radiological Study: Ordered and Reviewed  Medical Tests: Ordered and Reviewed            Scribe Attestation:   Scribe #1: I performed the above scribed service and the documentation accurately describes the services I performed. I attest to the accuracy of the note.    Attending Attestation:           Physician Attestation for Scribe:  Physician Attestation Statement for Scribe #1: I, Henrry Joseph MD, reviewed documentation, as scribed by Aarti Arias in my presence, and it is both accurate and complete.             medical decision making: Discussed case with hospitalist Dr. Springer due to not having a pulmonologist the patient will need to be transferred to [facility] have discussed the case with the nurse practitioner at [facility] and the patient is accepted to the service of Dr. He.  Patient has a new diagnosis of the left lung cancer now with large pleural effusion chest and complete diagnosis and workup and will need oncology probably interventional radiology the  services will be provided at [facility] patient is stable she is satting 96% on 3 L and is not complaining of any chest pain or uncomfortable and she is in agreement with this plan was her intent to switch her oncology care to [facility] , as she had not been seen here previously only at [place]     ED Course      Clinical Impression:   The primary encounter diagnosis was History of lung cancer. Diagnoses of SOB (shortness of breath), Pleural effusion on left, Bronchospasm, Other elevated white blood cell (WBC) count, and Cough with hemoptysis were also pertinent to this visit.                           Henrry Joseph MD  12/22/17 0020

## 2017-12-23 VITALS
HEART RATE: 104 BPM | OXYGEN SATURATION: 95 % | RESPIRATION RATE: 16 BRPM | WEIGHT: 104.5 LBS | TEMPERATURE: 98 F | DIASTOLIC BLOOD PRESSURE: 69 MMHG | HEIGHT: 63 IN | BODY MASS INDEX: 18.52 KG/M2 | SYSTOLIC BLOOD PRESSURE: 126 MMHG

## 2017-12-23 PROBLEM — C34.92 CARCINOMA OF LEFT LUNG: Status: ACTIVE | Noted: 2017-12-23

## 2017-12-23 PROBLEM — J18.9 POSTOBSTRUCTIVE PNEUMONIA: Status: ACTIVE | Noted: 2017-12-23

## 2017-12-23 LAB
ANION GAP SERPL CALC-SCNC: 8 MMOL/L
BASOPHILS # BLD AUTO: 0.01 K/UL
BASOPHILS NFR BLD: 0 %
BUN SERPL-MCNC: 13 MG/DL
CALCIUM SERPL-MCNC: 8.4 MG/DL
CHLORIDE SERPL-SCNC: 100 MMOL/L
CO2 SERPL-SCNC: 27 MMOL/L
CREAT SERPL-MCNC: 0.7 MG/DL
DIFFERENTIAL METHOD: ABNORMAL
EOSINOPHIL # BLD AUTO: 0.1 K/UL
EOSINOPHIL NFR BLD: 0.4 %
ERYTHROCYTE [DISTWIDTH] IN BLOOD BY AUTOMATED COUNT: 12.8 %
EST. GFR  (AFRICAN AMERICAN): >60 ML/MIN/1.73 M^2
EST. GFR  (NON AFRICAN AMERICAN): >60 ML/MIN/1.73 M^2
GLUCOSE SERPL-MCNC: 121 MG/DL
HCT VFR BLD AUTO: 33.8 %
HGB BLD-MCNC: 10.8 G/DL
LYMPHOCYTES # BLD AUTO: 1.8 K/UL
LYMPHOCYTES NFR BLD: 7.8 %
MAGNESIUM SERPL-MCNC: 2 MG/DL
MCH RBC QN AUTO: 28.7 PG
MCHC RBC AUTO-ENTMCNC: 32 G/DL
MCV RBC AUTO: 90 FL
MONOCYTES # BLD AUTO: 1.3 K/UL
MONOCYTES NFR BLD: 5.5 %
NEUTROPHILS # BLD AUTO: 19.5 K/UL
NEUTROPHILS NFR BLD: 86.3 %
PHOSPHATE SERPL-MCNC: 2.8 MG/DL
PLATELET # BLD AUTO: 457 K/UL
PMV BLD AUTO: 9.4 FL
POTASSIUM SERPL-SCNC: 3.6 MMOL/L
RBC # BLD AUTO: 3.76 M/UL
SODIUM SERPL-SCNC: 135 MMOL/L
WBC # BLD AUTO: 22.94 K/UL

## 2017-12-23 PROCEDURE — 80048 BASIC METABOLIC PNL TOTAL CA: CPT

## 2017-12-23 PROCEDURE — 83735 ASSAY OF MAGNESIUM: CPT

## 2017-12-23 PROCEDURE — 25000003 PHARM REV CODE 250: Performed by: PHYSICIAN ASSISTANT

## 2017-12-23 PROCEDURE — 25000003 PHARM REV CODE 250: Performed by: HOSPITALIST

## 2017-12-23 PROCEDURE — 94761 N-INVAS EAR/PLS OXIMETRY MLT: CPT

## 2017-12-23 PROCEDURE — 84100 ASSAY OF PHOSPHORUS: CPT

## 2017-12-23 PROCEDURE — 36415 COLL VENOUS BLD VENIPUNCTURE: CPT

## 2017-12-23 PROCEDURE — 25000242 PHARM REV CODE 250 ALT 637 W/ HCPCS: Performed by: NURSE PRACTITIONER

## 2017-12-23 PROCEDURE — 85025 COMPLETE CBC W/AUTO DIFF WBC: CPT

## 2017-12-23 PROCEDURE — G0378 HOSPITAL OBSERVATION PER HR: HCPCS

## 2017-12-23 PROCEDURE — 94799 UNLISTED PULMONARY SVC/PX: CPT

## 2017-12-23 PROCEDURE — 25000003 PHARM REV CODE 250: Performed by: NURSE PRACTITIONER

## 2017-12-23 PROCEDURE — 94640 AIRWAY INHALATION TREATMENT: CPT

## 2017-12-23 PROCEDURE — 27000221 HC OXYGEN, UP TO 24 HOURS

## 2017-12-23 RX ORDER — LORAZEPAM 1 MG/1
1 TABLET ORAL EVERY 6 HOURS PRN
Qty: 20 TABLET | Refills: 0 | Status: ON HOLD | OUTPATIENT
Start: 2017-12-23 | End: 2018-01-08 | Stop reason: HOSPADM

## 2017-12-23 RX ORDER — ENOXAPARIN SODIUM 100 MG/ML
40 INJECTION SUBCUTANEOUS DAILY
Qty: 36 ML | Refills: 3 | Status: ON HOLD | OUTPATIENT
Start: 2017-12-23 | End: 2018-01-08 | Stop reason: HOSPADM

## 2017-12-23 RX ORDER — MAG HYDROX/ALUMINUM HYD/SIMETH 200-200-20
30 SUSPENSION, ORAL (FINAL DOSE FORM) ORAL EVERY 6 HOURS PRN
Status: DISCONTINUED | OUTPATIENT
Start: 2017-12-23 | End: 2017-12-23 | Stop reason: HOSPADM

## 2017-12-23 RX ORDER — ACETAMINOPHEN 325 MG/1
650 TABLET ORAL EVERY 6 HOURS PRN
Status: DISCONTINUED | OUTPATIENT
Start: 2017-12-23 | End: 2017-12-23 | Stop reason: HOSPADM

## 2017-12-23 RX ORDER — HYDROCODONE BITARTRATE AND ACETAMINOPHEN 7.5; 325 MG/1; MG/1
1 TABLET ORAL EVERY 6 HOURS PRN
Qty: 20 TABLET | Refills: 0 | Status: ON HOLD | OUTPATIENT
Start: 2017-12-23 | End: 2018-01-08 | Stop reason: HOSPADM

## 2017-12-23 RX ORDER — MOXIFLOXACIN HYDROCHLORIDE 400 MG/1
400 TABLET ORAL DAILY
Status: DISCONTINUED | OUTPATIENT
Start: 2017-12-23 | End: 2017-12-23 | Stop reason: HOSPADM

## 2017-12-23 RX ORDER — NAPROXEN SODIUM 275 MG/1
550 TABLET ORAL 2 TIMES DAILY PRN
Status: DISCONTINUED | OUTPATIENT
Start: 2017-12-23 | End: 2017-12-23 | Stop reason: HOSPADM

## 2017-12-23 RX ORDER — LEVOFLOXACIN 750 MG/1
750 TABLET ORAL DAILY
Qty: 12 TABLET | Refills: 0 | Status: ON HOLD | OUTPATIENT
Start: 2017-12-23 | End: 2018-01-08 | Stop reason: HOSPADM

## 2017-12-23 RX ADMIN — MOXIFLOXACIN 400 MG: 400 TABLET, FILM COATED ORAL at 12:12

## 2017-12-23 RX ADMIN — IPRATROPIUM BROMIDE AND ALBUTEROL SULFATE 3 ML: .5; 3 SOLUTION RESPIRATORY (INHALATION) at 12:12

## 2017-12-23 RX ADMIN — BUPROPION HYDROCHLORIDE 300 MG: 150 TABLET, FILM COATED, EXTENDED RELEASE ORAL at 08:12

## 2017-12-23 RX ADMIN — ACETAMINOPHEN 650 MG: 325 TABLET ORAL at 07:12

## 2017-12-23 RX ADMIN — IPRATROPIUM BROMIDE AND ALBUTEROL SULFATE 3 ML: .5; 3 SOLUTION RESPIRATORY (INHALATION) at 04:12

## 2017-12-23 RX ADMIN — ALUMINUM HYDROXIDE, MAGNESIUM HYDROXIDE, AND SIMETHICONE 30 ML: 200; 200; 20 SUSPENSION ORAL at 02:12

## 2017-12-23 RX ADMIN — GUAIFENESIN 1200 MG: 600 TABLET, EXTENDED RELEASE ORAL at 08:12

## 2017-12-23 RX ADMIN — IPRATROPIUM BROMIDE AND ALBUTEROL SULFATE 3 ML: .5; 3 SOLUTION RESPIRATORY (INHALATION) at 07:12

## 2017-12-23 NOTE — PLAN OF CARE
Problem: Patient Care Overview  Goal: Plan of Care Review  Outcome: Ongoing (interventions implemented as appropriate)  Pt AA&O x 4. Pt occasionally complains of intramuscular pain from  Previous labored breathing. PRN pain meds given as ordered. Thoracentesis completed and patient states that she feels so much better. O2 weaned down to 4 liters. Respirations even and less labored. No evidence of distress noted. Safety maintained. Bed in lowest position, side rails up x 3, call light and personal items within reach. Pt notified to call for assistance if needed. Pt verbalizes understanding. Family at the bedside. Will continue to monitor.

## 2017-12-23 NOTE — PROGRESS NOTES
Home Oxygen Evaluation    Date Performed: 2017    1) Patient's Home O2 Sat on room air, while at rest: 90        If O2 sats on room air at rest are 88% or below, patient qualifies. No additional testing needed. Document N/A in steps 2 and 3. If 89% or above, complete steps 2.      2) Patient's O2 Sat on room air while exercisin%        If O2 sats on room air while exercising remain 89% or above patient does not qualify, no further testing needed Document N/A in step 3. If O2 sats on room air while exercising are 88% or below, continue to step 3.      3) Patient's O2 Sat while exercising on O2: 95% at 2 LPM         (Must show improvement from #2 for patients to qualify)    If O2 sats improve on oxygen, patient qualifies for portable oxygen. If not, the patient does not qualify.

## 2017-12-23 NOTE — PLAN OF CARE
· Pt ok to go home, encouraged to call back if breathing deteriorates. Given Dr. Batista's and my phone number. May need re-thoracentesis.  · Needs home oxygen  · Needs pain medication  · Needs lovenox (prophylactic dose)    Mathew Manzano MD  Fellow, [facility]  Cell [phone]

## 2017-12-23 NOTE — PLAN OF CARE
Problem: Patient Care Overview  Goal: Plan of Care Review  Pt doing IS @  .75  L of  2.2 L predicted. Will cont to encourage deep breathing and coughing. Pt received on nasal cannula at  4 lpm. SPO2 96  %.  Pt in no apparent respiratory distress.  Will continue to monitor.

## 2017-12-23 NOTE — PROGRESS NOTES
12/23/17 1315   Home Oxygen Qualification   Room Air SpO2 At Rest 90 %   Room Air SpO2 on Exertion (!) 86 %   SpO2 on Recovery 95 %   Recovery O2 LPM 2 LPM

## 2017-12-23 NOTE — PROGRESS NOTES
[facility] Medicine  Progress Note    Patient Name: Yen Falcon  MRN: [id number]  Patient Class: IP- Inpatient   Admission Date: 12/22/2017  Length of Stay: 1 days  Attending Physician: Brian He MD  Primary Care Provider: Lauren Goodman MD        Subjective:     Principal Problem:Pleural effusion on left    HPI:  Yen Falcon is  42 y.o.  woman (LMP: 11/26/17) with a history of recurrent upper respiratory infection, asthma diagnosed in 2013, smoking that she quit when she was diagnosed with asthma, a left pleural effusion since 10/3/17, and a malignant right axillary lymph node.  She is a former smoker, but quit when she was diagnosed with asthma.  She lives in [place], [place].            She presented to [facility] ED via EMS on 12/21/17 with progressive shortness of breath and hypoxia, and associated bright red hemoptysis beginning 2 days prior.  She was using a nebulizer at home with no relief.  Per EMS, room air oxygen saturation was 80% on room air which increased to 97% after breathing treatment and oxygen.  She stated that she has also had occasional right leg numbness.  She had been admitted to [place] [facility] on 10/30/17 with pneumonia in the L lung. She was told that she had fluid in her left lung as well. She never fully recovered and was treated by her primary care physician with more antibiotics and oral steroids.  She noticed a lump under right arm and she had an axillary biopsy performed at [place] by Dr. Lilia Stauffer 3 days ago and was told she had some form of cancer.  However, the source and type of cancer was still not determined.  She had not yet received any treatment at [place] and wished to switch her oncology care over to [facility].  She was not on any steroids.  She denied fever, chills, N/V/D, and any other symptoms.  Her chest X-ray showed a moderate to large volume left pleural effusion  with volume loss of the left lower lobe with additional opacification and groundglass attenuation within the aerated portion of the left upper lobe.   Her family felt it looked similar to her last X-ray at [place].   Her WBC count was 19,520, platelets 476, alk phos 148, ALT 47, AST 51.  A chest CT was attempted, but aborted because she could not lie flat due to respiratory distress.  She was treated with 1 liter of normal saline and several breathing treatments.  [facility] was contacted at [facility] for transfer for Pulmonology and Oncology evaluations.           Upon arrival to [facility], she was extremely anxious and short of breath, unable to lay back at all.  She was given methylprednisolone 125 mg IV, albuterol-ipratropium nebulizer treatment, and oral lorazepam, and was more comfortable.        Hospital Course:  She required supplemental oxygen.  Interventional Radiology performed thoracentesis removing 1.25 liters of serous fluid, which was sent for labs, culture, and cytology.  Pulmonology and Oncology were consulted.  Repeat x-ray showed the suspected tumor that was disguised by the pleural effusion.  Chest CT showed a left suprahilar mass appearing to encase the left main stem and upper lobe bronchi as well as the left main pulmonary artery, mediastinal and right axillary lymphadenopathy, and scattered consolidations in the lungs apart from the area obstructed by the mass.  Oncologist Dr. Vinnie Quiros noted subcutaneous deposits consistent with metastases.  He recommended outpatient PET and brain MRI.  Pulmonology recommended anticoagulation.  Oxygen saturation on room air was 88%.  She was prescribed home oxygen, a course of levofloxacin, an anticoagulant, hydrocodone-acetaminophen 7.5-325 mg (20 tablets) and lorazepam 1 mg (20 tablets) for panic attacks.  She will follow up with Dr. Quiros.    Interval History: Ready to go home.  Requests medication for panic  attacks.    Review of Systems   Constitutional: Negative for chills and fever.   Gastrointestinal: Negative for nausea and vomiting.     Objective:     Vital Signs (Most Recent):  Temp: 97.5 °F (36.4 °C) (12/22/17 0923)  Pulse: (!) 114 (12/22/17 0923)  Resp: (!) 26 (12/22/17 0923)  BP: 129/69 (12/22/17 0923)  SpO2: 100 % (12/22/17 0903) Vital Signs (24h Range):  Temp:  [97.5 °F (36.4 °C)-98.9 °F (37.2 °C)] 97.5 °F (36.4 °C)  Pulse:  [109-126] 114  Resp:  [16-28] 26  SpO2:  [93 %-100 %] 100 %  BP: (121-155)/() 129/69     Weight: 48.6 kg (107 lb 2.3 oz)  Body mass index is 18.98 kg/m².    Intake/Output Summary (Last 24 hours) at 12/22/17 1015  Last data filed at 12/22/17 0845   Gross per 24 hour   Intake                0 ml   Output             1.25 ml   Net            -1.25 ml      Physical Exam   Constitutional: She is oriented to person, place, and time. She appears well-developed. She appears cachectic. Nasal cannula in place.   Pulmonary/Chest: Effort normal. No respiratory distress. She has decreased breath sounds in the left lower field.   Neurological: She is alert and oriented to person, place, and time.   Psychiatric: She has a normal mood and affect.   Nursing note and vitals reviewed.      Significant Labs: All pertinent labs within the past 24 hours have been reviewed.    Significant Imaging: I have reviewed all pertinent imaging results/findings within the past 24 hours.   CT Chest Without Contrast 12/22/17: The structures at the base of the neck are unremarkable. The thoracic aorta maintains normal caliber, contour, and course without significant atherosclerotic calcification.    The heart is not enlarged and there is no evidence of pericardial effusion. There is     There is an ill-defined left suprahilar mass with internal low density. Margins are difficult to definitively delineate, but this measures approximately 5.0 x 3.9 cm axial diameter. This encases the left main stem and particularly the  left upper lobe bronchus. There is left upper lobe postobstructive consolidation. There is also suspected encasement of the left main pulmonary artery, although this is difficult to definitively evaluate without IV contrast. There are also scattered airspace consult throughout the remaining visualized lungs. This particularly evident in the left lower lobe. There are small bilateral pleural effusions.  Extensive mediastinal adenopathy. For reference, there is a 1.6 cm subcarinal lymph node and a 1.1 cm prevascular lymph node there is a 1.1 cm right axillary lymph node.  The osseous structures are unremarkable. Limited views of the abdomen demonstrate nothing unusual.  Impression:  There is a large left suprahilar mass which appears to encase the left main stem and upper lobe bronchi as well as the left main pulmonary artery. This is concerning for a primary lung malignancy.  Mediastinal and right axillary adenopathy concerning for metastatic disease.  Obstructive in the left upper lobe with scattered consolidations throughout the remaining lungs, likely infectious. Small bilateral pleural effusions.    Assessment/Plan:      * Pleural effusion on left    Drained.  Due to lung cancer.  Follow up cytology.            Bilateral pneumonia    Prescribe a longer course of levofloxacin due to underlying cancer.          Carcinoma of left lung    Malignant neoplasm metastatic to lymph node of axilla  Stage IV lung cancer.  Follow up with Dr. Quiros.  Pulmonology also recommended anticoagulation and will prescribe per their recommendation.          Anxiety    Continue Home bupropion 300 mg daily and give Ativan 1 mg every 6 hours prn          Acute hypoxemic respiratory failure    Was hypoxic prior to admission.  Still hypoxic after effusion drainage.  Prescribe home oxygen.          Asthma    Hypoxemic Respiratory Failure  Duoneb Treatments every 4 hours  Solumedrol 125 mg IV then 8- mg TIB  HiFLo Nasal Cannula Oxygen, When  for Comfort and SaO2 > 93%          VTE Risk Mitigation         Ordered     enoxaparin injection 40 mg  Daily     Route:  Subcutaneous        12/22/17 0230     Medium Risk of VTE  Once      12/22/17 0230              Brian He MD  Department of Hospital Medicine   [facility]

## 2017-12-23 NOTE — DISCHARGE SUMMARY
[facility]  Discharge Summary      Patient Name: Yne Falcon  MRN: [id number]  Admission Date: 12/22/2017  Hospital Length of Stay: 1 days  Discharge Date and Time: 12/23/2017  7:04 PM  Attending Physician: Brian He MD   Discharging Provider: Brian He MD  Primary Care Provider: Lauren Goodman MD      HPI:   Yen Falcon is  42 y.o.  woman (LMP: 11/26/17) with a history of recurrent upper respiratory infection, asthma diagnosed in 2013, smoking that she quit when she was diagnosed with asthma, a left pleural effusion since 10/3/17, and a malignant right axillary lymph node.  She is a former smoker, but quit when she was diagnosed with asthma.  She lives in [place], [place].            She presented to [facility] ED via EMS on 12/21/17 with progressive shortness of breath and hypoxia, and associated bright red hemoptysis beginning 2 days prior.  She was using a nebulizer at home with no relief.  Per EMS, room air oxygen saturation was 80% on room air which increased to 97% after breathing treatment and oxygen.  She stated that she has also had occasional right leg numbness.  She had been admitted to [facility] on 10/30/17 with pneumonia in the L lung. She was told that she had fluid in her left lung as well. She never fully recovered and was treated by her primary care physician with more antibiotics and oral steroids.  She noticed a lump under right arm and she had an axillary biopsy performed at [facility] by Dr. Lilia Stauffer 3 days ago and was told she had some form of cancer.  However, the source and type of cancer was still not determined.  She had not yet received any treatment at [facility] and wished to switch her oncology care over to [facility].  She was not on any steroids.  She denied fever, chills, N/V/D, and any other symptoms.  Her chest X-ray showed a moderate to large volume left pleural  effusion with volume loss of the left lower lobe with additional opacification and groundglass attenuation within the aerated portion of the left upper lobe.   Her family felt it looked similar to her last X-ray at [place].   Her WBC count was 19,520, platelets 476, alk phos 148, ALT 47, AST 51.  A chest CT was attempted, but aborted because she could not lie flat due to respiratory distress.  She was treated with 1 liter of normal saline and several breathing treatments.  [facility] was contacted at [facility] for transfer for Pulmonology and Oncology evaluations.           Upon arrival to [facility], she was extremely anxious and short of breath, unable to lay back at all.  She was given methylprednisolone 125 mg IV, albuterol-ipratropium nebulizer treatment, and oral lorazepam, and was more comfortable.      Procedures:  Thoracentesis 12/22/17    Hospital Course:   She required supplemental oxygen.  Interventional Radiology performed thoracentesis removing 1.25 liters of serous fluid, which was sent for labs, culture, and cytology.  Pulmonology and Oncology were consulted.  Repeat x-ray showed the suspected tumor that was disguised by the pleural effusion.  Chest CT showed a left suprahilar mass appearing to encase the left main stem and upper lobe bronchi as well as the left main pulmonary artery, mediastinal and right axillary lymphadenopathy, and scattered consolidations in the lungs apart from the area obstructed by the mass.  Oncologist Dr. Vinnie Quiros noted subcutaneous deposits consistent with metastases.  He recommended outpatient PET and brain MRI.  Pulmonology recommended prophylactic anticoagulation with enoxaparin.  Oxygen saturation on room air was 88%.  She was prescribed home oxygen, a course of levofloxacin, enoxaparin 40 mg daily, hydrocodone-acetaminophen 7.5-325 mg (20 tablets) and lorazepam 1 mg (20 tablets) for panic attacks.  She will follow up with  "Dr. Quiros.     Consults:   Consults         Status Ordering Provider     Inpatient consult to Hematology/Oncology  Once     Provider:  (Not yet assigned)    Acknowledged EKTA PUENTES     Inpatient consult to Interventional Radiology  Once     Provider:  (Not yet assigned)    Completed EKTA PUENTES     Pulmonology  Once     Provider:  (Not yet assigned)    Completed EKTA PUENTES        Final Active Diagnoses:    Diagnosis Date Noted POA    PRINCIPAL PROBLEM:  Pleural effusion on left [J90] 12/22/2017 Yes    Carcinoma of left lung [C34.92] 12/23/2017 Yes    Bilateral pneumonia [J18.9] 12/23/2017 Yes    Acute hypoxemic respiratory failure [J96.01] 12/22/2017 Yes    Anxiety [F41.9] 12/22/2017 Yes    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    Specialties:  Hematology and Oncology, Hematology  Contact information:  [address] [place] [address]  [phone]                 Patient Instructions:     OXYGEN FOR HOME USE   Order Specific Question Answer Comments   Liter Flow 2    Duration Continuous    Qualifying SpO2: 88    Testing done at: [facility]    Device home concentrator with portable unit    Length of need (in months): 99 mos    Patient condition with qualifying saturation other chronic pulmonary condition    Height: 5' 3" (1.6 m)    Weight: 47.4 kg (104 lb 8 oz)    Does patient have medical equipment at home? none    Alternative treatment measures have been tried or considered and deemed clinically ineffective. Yes      Diet Adult Regular     Activity as tolerated     Notify your health care provider if you experience any of the following:  difficulty breathing or increased cough "         Significant Diagnostic Studies:   X-Ray Chest 1 View 12/22/17: Notable interval decrease in the size of the large left pleural effusion following thoracentesis. Small volume of residual pleural fluid. No pneumothorax.  Large area of consolidation in the left mid to upper lung zone. Findings may reflect pneumonia, but concerning for underlying mass with postobstructive changes. Recommend followup at a minimum. CT the chest should at least be considered if not previously performed.  Trace right pleural fluid. Right lung relatively clear with.  CT Chest Without Contrast 12/22/17: The structures at the base of the neck are unremarkable. The thoracic aorta maintains normal caliber, contour, and course without significant atherosclerotic calcification.    The heart is not enlarged and there is no evidence of pericardial effusion. There is     There is an ill-defined left suprahilar mass with internal low density. Margins are difficult to definitively delineate, but this measures approximately 5.0 x 3.9 cm axial diameter. This encases the left main stem and particularly the left upper lobe bronchus. There is left upper lobe postobstructive consolidation. There is also suspected encasement of the left main pulmonary artery, although this is difficult to definitively evaluate without IV contrast. There are also scattered airspace consult throughout the remaining visualized lungs. This particularly evident in the left lower lobe. There are small bilateral pleural effusions.  Extensive mediastinal adenopathy. For reference, there is a 1.6 cm subcarinal lymph node and a 1.1 cm prevascular lymph node there is a 1.1 cm right axillary lymph node.  The osseous structures are unremarkable. Limited views of the abdomen demonstrate nothing unusual.  Impression:  There is a large left suprahilar mass which appears to encase the left main stem and upper lobe bronchi as well as the left main pulmonary artery. This is concerning  for a primary lung malignancy.  Mediastinal and right axillary adenopathy concerning for metastatic disease.  Obstructive in the left upper lobe with scattered consolidations throughout the remaining lungs, likely infectious. Small bilateral pleural effusions.    Pending Diagnostic Studies:     Procedure Component Value Units Date/Time    Cytology Specimen-Medical Cytology (Fluid/Wash/Brush) [[id number]] Collected:  12/22/17 0842    Order Status:  Sent Lab Status:  In process Updated:  12/22/17 0926    Specimen:  Pleural Fluid from Pleural Fluid     IR Thoracentesis with Needle [[id number]]     Order Status:  Sent Lab Status:  No result          Medications:  Reconciled Home Medications:   Current Discharge Medication List      START taking these medications    Details   enoxaparin (LOVENOX) 40 mg/0.4 mL Syrg Inject 0.4 mLs (40 mg total) into the skin once daily.  Qty: 36 mL, Refills: 3      hydrocodone-acetaminophen 7.5-325mg (NORCO) 7.5-325 mg per tablet Take 1 tablet by mouth every 6 (six) hours as needed for Pain.  Qty: 20 tablet, Refills: 0      levoFLOXacin (LEVAQUIN) 750 MG tablet Take 1 tablet (750 mg total) by mouth once daily.  Qty: 12 tablet, Refills: 0      LORazepam (ATIVAN) 1 MG tablet Take 1 tablet (1 mg total) by mouth every 6 (six) hours as needed for Anxiety.  Qty: 20 tablet, Refills: 0         CONTINUE these medications which have NOT CHANGED    Details   ascorbic acid (VITAMIN C) 500 MG tablet Take 500 mg by mouth once daily.      buPROPion (WELLBUTRIN XL) 300 MG 24 hr tablet Take 1 tablet (300 mg total) by mouth once daily.  Qty: 30 tablet, Refills: 5      diphenhydrAMINE (SOMINEX) 25 mg tablet Take 25 mg by mouth nightly as needed.      multivitamin (ONE DAILY MULTIVITAMIN) per tablet Take 1 tablet by mouth once daily.      pseudoephedrine-guaifenesin  mg (MUCINEX D)  mg per tablet Take 1 tablet by mouth every 12 (twelve) hours.      VENTOLIN HFA 90 mcg/actuation inhaler INL 2 PFS PO  QID PRF WHZ  Refills: 3             Indwelling Lines/Drains at time of discharge: None  Time spent on the discharge of patient: 40 minutes  Patient was seen and examined on the date of discharge and determined to be suitable for discharge.         Brian He MD  Department of Hospital Medicine  [facility]

## 2017-12-23 NOTE — ASSESSMENT & PLAN NOTE
Malignant neoplasm metastatic to lymph node of axilla  Stage IV lung cancer.  Follow up with Dr. Quiros.  Pulmonology also recommended anticoagulation and will prescribe per their recommendation.

## 2017-12-23 NOTE — PLAN OF CARE
Problem: Patient Care Overview  Goal: Plan of Care Review  Outcome: Ongoing (interventions implemented as appropriate)  Patient on oxygen with documented flow.  Will attempt to wean per O2 order protocol. The proper method of use, as well as anticipated side effects, of this aerosol treatment are discussed and demonstrated to the patient.  Will continue to monitor.

## 2017-12-26 ENCOUNTER — TELEPHONE (OUTPATIENT)
Dept: HEMATOLOGY/ONCOLOGY | Facility: CLINIC | Age: 42
End: 2017-12-26

## 2017-12-26 ENCOUNTER — HOSPITAL ENCOUNTER (INPATIENT)
Facility: HOSPITAL | Age: 42
LOS: 13 days | Discharge: HOSPICE | DRG: 180 | End: 2018-01-08
Attending: EMERGENCY MEDICINE | Admitting: HOSPITALIST
Payer: COMMERCIAL

## 2017-12-26 DIAGNOSIS — J90 PLEURAL EFFUSION ON LEFT: Primary | ICD-10-CM

## 2017-12-26 DIAGNOSIS — K59.00 CONSTIPATION, UNSPECIFIED CONSTIPATION TYPE: ICD-10-CM

## 2017-12-26 DIAGNOSIS — C77.3 MALIGNANT NEOPLASM METASTATIC TO LYMPH NODE OF AXILLA: ICD-10-CM

## 2017-12-26 DIAGNOSIS — C79.31 BRAIN METASTASES: ICD-10-CM

## 2017-12-26 DIAGNOSIS — R68.81 EARLY SATIETY: ICD-10-CM

## 2017-12-26 DIAGNOSIS — C34.92 ADENOCARCINOMA OF LEFT LUNG, STAGE 4: ICD-10-CM

## 2017-12-26 DIAGNOSIS — J96.01 ACUTE HYPOXEMIC RESPIRATORY FAILURE: ICD-10-CM

## 2017-12-26 DIAGNOSIS — R06.02 SHORTNESS OF BREATH: ICD-10-CM

## 2017-12-26 DIAGNOSIS — C34.92 MALIGNANT NEOPLASM OF LEFT LUNG STAGE 4: ICD-10-CM

## 2017-12-26 DIAGNOSIS — Z46.82 ADMISSION FOR CHEST TUBE PLACEMENT: ICD-10-CM

## 2017-12-26 LAB
ALBUMIN SERPL BCP-MCNC: 1.8 G/DL
ALP SERPL-CCNC: 131 U/L
ALT SERPL W/O P-5'-P-CCNC: 132 U/L
ANION GAP SERPL CALC-SCNC: 10 MMOL/L
APTT BLDCRRT: 24.3 SEC
AST SERPL-CCNC: 63 U/L
BACTERIA SPEC AEROBE CULT: NO GROWTH
BASOPHILS # BLD AUTO: 0.06 K/UL
BASOPHILS NFR BLD: 0.3 %
BILIRUB SERPL-MCNC: 0.3 MG/DL
BNP SERPL-MCNC: 17 PG/ML
BUN SERPL-MCNC: 15 MG/DL
CALCIUM SERPL-MCNC: 8.4 MG/DL
CHLORIDE SERPL-SCNC: 100 MMOL/L
CO2 SERPL-SCNC: 24 MMOL/L
CREAT SERPL-MCNC: 0.6 MG/DL
DIFFERENTIAL METHOD: ABNORMAL
EOSINOPHIL # BLD AUTO: 1 K/UL
EOSINOPHIL NFR BLD: 4.2 %
ERYTHROCYTE [DISTWIDTH] IN BLOOD BY AUTOMATED COUNT: 12.9 %
EST. GFR  (AFRICAN AMERICAN): >60 ML/MIN/1.73 M^2
EST. GFR  (NON AFRICAN AMERICAN): >60 ML/MIN/1.73 M^2
FLUAV AG SPEC QL IA: NEGATIVE
FLUBV AG SPEC QL IA: NEGATIVE
GLUCOSE SERPL-MCNC: 108 MG/DL
HCT VFR BLD AUTO: 37.4 %
HGB BLD-MCNC: 12.2 G/DL
INR PPP: 1.1
LACTATE SERPL-SCNC: 1.8 MMOL/L
LYMPHOCYTES # BLD AUTO: 1.9 K/UL
LYMPHOCYTES NFR BLD: 8.3 %
MCH RBC QN AUTO: 29 PG
MCHC RBC AUTO-ENTMCNC: 32.6 G/DL
MCV RBC AUTO: 89 FL
MONOCYTES # BLD AUTO: 1.2 K/UL
MONOCYTES NFR BLD: 5.3 %
NEUTROPHILS # BLD AUTO: 17.7 K/UL
NEUTROPHILS NFR BLD: 81.9 %
PLATELET # BLD AUTO: 425 K/UL
PLATELET BLD QL SMEAR: ABNORMAL
PMV BLD AUTO: 10 FL
POTASSIUM SERPL-SCNC: 4.1 MMOL/L
PROT SERPL-MCNC: 6 G/DL
PROTHROMBIN TIME: 11.1 SEC
RBC # BLD AUTO: 4.2 M/UL
SODIUM SERPL-SCNC: 134 MMOL/L
SPECIMEN SOURCE: NORMAL
WBC # BLD AUTO: 22.7 K/UL

## 2017-12-26 PROCEDURE — 80053 COMPREHEN METABOLIC PANEL: CPT

## 2017-12-26 PROCEDURE — 0W9B3ZZ DRAINAGE OF LEFT PLEURAL CAVITY, PERCUTANEOUS APPROACH: ICD-10-PCS | Performed by: INTERNAL MEDICINE

## 2017-12-26 PROCEDURE — 93005 ELECTROCARDIOGRAM TRACING: CPT

## 2017-12-26 PROCEDURE — 85730 THROMBOPLASTIN TIME PARTIAL: CPT

## 2017-12-26 PROCEDURE — 83605 ASSAY OF LACTIC ACID: CPT

## 2017-12-26 PROCEDURE — 94640 AIRWAY INHALATION TREATMENT: CPT

## 2017-12-26 PROCEDURE — 93010 ELECTROCARDIOGRAM REPORT: CPT | Mod: ,,, | Performed by: INTERNAL MEDICINE

## 2017-12-26 PROCEDURE — 25000003 PHARM REV CODE 250: Performed by: EMERGENCY MEDICINE

## 2017-12-26 PROCEDURE — 85610 PROTHROMBIN TIME: CPT

## 2017-12-26 PROCEDURE — 83880 ASSAY OF NATRIURETIC PEPTIDE: CPT

## 2017-12-26 PROCEDURE — 85025 COMPLETE CBC W/AUTO DIFF WBC: CPT

## 2017-12-26 PROCEDURE — 96361 HYDRATE IV INFUSION ADD-ON: CPT

## 2017-12-26 PROCEDURE — 25000003 PHARM REV CODE 250: Performed by: HOSPITALIST

## 2017-12-26 PROCEDURE — 20000000 HC ICU ROOM

## 2017-12-26 PROCEDURE — 63600175 PHARM REV CODE 636 W HCPCS: Performed by: EMERGENCY MEDICINE

## 2017-12-26 PROCEDURE — A4216 STERILE WATER/SALINE, 10 ML: HCPCS | Performed by: EMERGENCY MEDICINE

## 2017-12-26 PROCEDURE — 87400 INFLUENZA A/B EACH AG IA: CPT | Mod: 59

## 2017-12-26 PROCEDURE — 25000242 PHARM REV CODE 250 ALT 637 W/ HCPCS: Performed by: EMERGENCY MEDICINE

## 2017-12-26 PROCEDURE — 99285 EMERGENCY DEPT VISIT HI MDM: CPT | Mod: 25

## 2017-12-26 PROCEDURE — 27100171 HC OXYGEN HIGH FLOW UP TO 24 HOURS

## 2017-12-26 PROCEDURE — 96374 THER/PROPH/DIAG INJ IV PUSH: CPT

## 2017-12-26 RX ORDER — SODIUM CHLORIDE 0.9 % (FLUSH) 0.9 %
3 SYRINGE (ML) INJECTION EVERY 8 HOURS
Status: DISCONTINUED | OUTPATIENT
Start: 2017-12-26 | End: 2018-01-08 | Stop reason: HOSPADM

## 2017-12-26 RX ORDER — MORPHINE SULFATE 10 MG/ML
4 INJECTION INTRAMUSCULAR; INTRAVENOUS; SUBCUTANEOUS
Status: COMPLETED | OUTPATIENT
Start: 2017-12-26 | End: 2017-12-26

## 2017-12-26 RX ORDER — SODIUM CHLORIDE 0.9 % (FLUSH) 0.9 %
5 SYRINGE (ML) INJECTION
Status: DISCONTINUED | OUTPATIENT
Start: 2017-12-26 | End: 2018-01-08 | Stop reason: HOSPADM

## 2017-12-26 RX ORDER — IPRATROPIUM BROMIDE AND ALBUTEROL SULFATE 2.5; .5 MG/3ML; MG/3ML
3 SOLUTION RESPIRATORY (INHALATION)
Status: COMPLETED | OUTPATIENT
Start: 2017-12-26 | End: 2017-12-26

## 2017-12-26 RX ORDER — DIPHENHYDRAMINE HCL 25 MG
25 CAPSULE ORAL NIGHTLY PRN
Status: DISCONTINUED | OUTPATIENT
Start: 2017-12-26 | End: 2018-01-08

## 2017-12-26 RX ORDER — SODIUM CHLORIDE 9 MG/ML
INJECTION, SOLUTION INTRAVENOUS CONTINUOUS
Status: DISCONTINUED | OUTPATIENT
Start: 2017-12-26 | End: 2017-12-26

## 2017-12-26 RX ORDER — ACETAMINOPHEN 325 MG/1
650 TABLET ORAL EVERY 6 HOURS PRN
Status: DISCONTINUED | OUTPATIENT
Start: 2017-12-26 | End: 2018-01-08 | Stop reason: HOSPADM

## 2017-12-26 RX ORDER — MOXIFLOXACIN HYDROCHLORIDE 400 MG/1
400 TABLET ORAL DAILY
Status: DISCONTINUED | OUTPATIENT
Start: 2017-12-27 | End: 2018-01-03

## 2017-12-26 RX ORDER — MAG HYDROX/ALUMINUM HYD/SIMETH 200-200-20
30 SUSPENSION, ORAL (FINAL DOSE FORM) ORAL EVERY 6 HOURS PRN
Status: DISCONTINUED | OUTPATIENT
Start: 2017-12-26 | End: 2018-01-08 | Stop reason: HOSPADM

## 2017-12-26 RX ORDER — BUPROPION HYDROCHLORIDE 150 MG/1
300 TABLET ORAL DAILY
Status: DISCONTINUED | OUTPATIENT
Start: 2017-12-27 | End: 2018-01-08

## 2017-12-26 RX ORDER — LORAZEPAM 1 MG/1
1 TABLET ORAL EVERY 6 HOURS PRN
Status: DISCONTINUED | OUTPATIENT
Start: 2017-12-26 | End: 2018-01-07

## 2017-12-26 RX ORDER — IPRATROPIUM BROMIDE AND ALBUTEROL SULFATE 2.5; .5 MG/3ML; MG/3ML
3 SOLUTION RESPIRATORY (INHALATION) EVERY 4 HOURS PRN
Status: DISCONTINUED | OUTPATIENT
Start: 2017-12-26 | End: 2018-01-08 | Stop reason: HOSPADM

## 2017-12-26 RX ORDER — HYDROCODONE BITARTRATE AND ACETAMINOPHEN 7.5; 325 MG/1; MG/1
1 TABLET ORAL EVERY 6 HOURS PRN
Status: DISCONTINUED | OUTPATIENT
Start: 2017-12-26 | End: 2018-01-08

## 2017-12-26 RX ORDER — POLYETHYLENE GLYCOL 3350 17 G/17G
17 POWDER, FOR SOLUTION ORAL 2 TIMES DAILY
Status: DISCONTINUED | OUTPATIENT
Start: 2017-12-26 | End: 2018-01-08

## 2017-12-26 RX ORDER — ENOXAPARIN SODIUM 100 MG/ML
40 INJECTION SUBCUTANEOUS DAILY
Status: DISCONTINUED | OUTPATIENT
Start: 2017-12-27 | End: 2018-01-08

## 2017-12-26 RX ORDER — MORPHINE SULFATE 10 MG/ML
4 INJECTION INTRAMUSCULAR; INTRAVENOUS; SUBCUTANEOUS EVERY 4 HOURS PRN
Status: DISCONTINUED | OUTPATIENT
Start: 2017-12-26 | End: 2017-12-29

## 2017-12-26 RX ORDER — GUAIFENESIN 600 MG/1
1200 TABLET, EXTENDED RELEASE ORAL 2 TIMES DAILY
Status: DISCONTINUED | OUTPATIENT
Start: 2017-12-26 | End: 2017-12-30

## 2017-12-26 RX ORDER — BISACODYL 5 MG
10 TABLET, DELAYED RELEASE (ENTERIC COATED) ORAL ONCE
Status: COMPLETED | OUTPATIENT
Start: 2017-12-26 | End: 2017-12-26

## 2017-12-26 RX ORDER — ONDANSETRON 2 MG/ML
4 INJECTION INTRAMUSCULAR; INTRAVENOUS EVERY 8 HOURS PRN
Status: DISCONTINUED | OUTPATIENT
Start: 2017-12-26 | End: 2018-01-08 | Stop reason: HOSPADM

## 2017-12-26 RX ORDER — NAPROXEN SODIUM 275 MG/1
550 TABLET ORAL 2 TIMES DAILY PRN
Status: DISCONTINUED | OUTPATIENT
Start: 2017-12-26 | End: 2017-12-30

## 2017-12-26 RX ADMIN — SODIUM CHLORIDE, PRESERVATIVE FREE 3 ML: 5 INJECTION INTRAVENOUS at 10:12

## 2017-12-26 RX ADMIN — SODIUM CHLORIDE: 0.9 INJECTION, SOLUTION INTRAVENOUS at 05:12

## 2017-12-26 RX ADMIN — MORPHINE SULFATE 4 MG: 10 INJECTION INTRAVENOUS at 02:12

## 2017-12-26 RX ADMIN — BISACODYL 10 MG: 5 TABLET, DELAYED RELEASE ORAL at 11:12

## 2017-12-26 RX ADMIN — IPRATROPIUM BROMIDE AND ALBUTEROL SULFATE 3 ML: .5; 3 SOLUTION RESPIRATORY (INHALATION) at 01:12

## 2017-12-26 RX ADMIN — POLYETHYLENE GLYCOL 3350 17 G: 17 POWDER, FOR SOLUTION ORAL at 10:12

## 2017-12-26 RX ADMIN — GUAIFENESIN 1200 MG: 600 TABLET, EXTENDED RELEASE ORAL at 10:12

## 2017-12-26 NOTE — ED TRIAGE NOTES
Pt was recently released from [facility] on Saturday. Pt had a thoracentesis to remove fluid from lungs during her recent stay and reports feeling exactly the same as she did before being admitted. Pt complains of sob, pressure on chest, and chest pain.

## 2017-12-26 NOTE — HPI
Yen Falcon is  42 y.o.  woman (LMP: 11/26/17) with a history of recurrent upper respiratory infection, asthma diagnosed in 2013, smoking that she quit when she was diagnosed with asthma, a left malignant pleural effusion since 10/30/17 due to stage 4 left lung carcinoma.  She is a former smoker, but quit when she was diagnosed with asthma.  She lives in [place], [place].   She was found to have a left pleural effusion at [facility] on 10/30/17 that was subsequently found to be due to metastatic lung cancer after a biopsy of a right axillary lymph node metastasis on 12/18/17.  She was hospitalized at [facility] from 12/22/17 to 12/23/17 after being transferred from [facility] for hypoxia due to the effusion.  Interventional Radiology performed thoracentesis removing 1.25 liters.  She was seen by Pulmonology (Dr. Cheko Batista and Dr. Branden Manzano) who recommended prophylactic enoxaparin.  She was seen by hematologist Dr. Vinnie Quiros to establish care.  She was prescribed home oxygen with expectation for the effusion to reaccumulate.   She returned to [facility] the day after [place] with worsening shortness of breath confirmed to be due to effusion reaccumulation on chest x-ray.   Dr. Foote and Dr. Manzano drained another 1.1 liter and planned inpatient Pleur-X catheter placement, which would not be able to be done for another 2 days due to lack of staff.  She was readmitted to [facility] for this.

## 2017-12-26 NOTE — H&P
[facility]  History & Physical    Patient Name: Yen Falcon  MRN: [id number]  Admission Date: 12/26/2017  Attending Physician: Brian He MD  Primary Care Provider: Lauren Goodman MD         Patient information was obtained from patient, past medical records and ER records.     Subjective:     Principal Problem:Admission for chest tube placement    Chief Complaint:   Chief Complaint   Patient presents with    Shortness of Breath     sob  sicne saturday after being d/c from this facility. pt has lung CA        HPI: Yen Falcon is  42 y.o.  woman (LMP: 11/26/17) with a history of recurrent upper respiratory infection, asthma diagnosed in 2013, smoking that she quit when she was diagnosed with asthma, a left malignant pleural effusion since 10/30/17 due to stage 4 left lung carcinoma.  She is a former smoker, but quit when she was diagnosed with asthma.  She lives in [place], [place].   She was found to have a left pleural effusion at [facility] on 10/30/17 that was subsequently found to be due to metastatic lung cancer after a biopsy of a right axillary lymph node metastasis on 12/18/17.  She was hospitalized at [facility] from 12/22/17 to 12/23/17 after being transferred from [facility] for hypoxia due to the effusion.  Interventional Radiology performed thoracentesis removing 1.25 liters.  She was seen by Pulmonology (Dr. Cheko Batista and Dr. Branden Manzano) who recommended prophylactic enoxaparin.  She was seen by hematologist Dr. Vinnie Quiros to establish care.  She was prescribed home oxygen with expectation for the effusion to reaccumulate.   She returned to [facility] the day after [place] with worsening shortness of breath confirmed to be due to effusion reaccumulation on chest x-ray.   Dr. Foote and Dr. Manzano drained another 1.1 liter and  planned inpatient Pleur-X catheter placement, which would not be able to be done for another 2 days due to lack of staff.  She was readmitted to [facility] for this.    Past Medical History:   Diagnosis Date    Allergy     Angio-edema     Asthma     Recurrent upper respiratory infection (URI)     Urticaria        Past Surgical History:   Procedure Laterality Date     SECTION      2 occurences       Review of patient's allergies indicates:   Allergen Reactions    Doxycycline Rash    Cat/feline products        No current facility-administered medications on file prior to encounter.      Current Outpatient Prescriptions on File Prior to Encounter   Medication Sig    ascorbic acid (VITAMIN C) 500 MG tablet Take 500 mg by mouth once daily.    buPROPion (WELLBUTRIN XL) 300 MG 24 hr tablet Take 1 tablet (300 mg total) by mouth once daily.    diphenhydrAMINE (SOMINEX) 25 mg tablet Take 25 mg by mouth nightly as needed.    enoxaparin (LOVENOX) 40 mg/0.4 mL Syrg Inject 0.4 mLs (40 mg total) into the skin once daily.    hydrocodone-acetaminophen 7.5-325mg (NORCO) 7.5-325 mg per tablet Take 1 tablet by mouth every 6 (six) hours as needed for Pain.    levoFLOXacin (LEVAQUIN) 750 MG tablet Take 1 tablet (750 mg total) by mouth once daily.    LORazepam (ATIVAN) 1 MG tablet Take 1 tablet (1 mg total) by mouth every 6 (six) hours as needed for Anxiety.    multivitamin (ONE DAILY MULTIVITAMIN) per tablet Take 1 tablet by mouth once daily.    pseudoephedrine-guaifenesin  mg (MUCINEX D)  mg per tablet Take 1 tablet by mouth every 12 (twelve) hours.    VENTOLIN HFA 90 mcg/actuation inhaler INL 2 PFS PO QID PRF WHZ     Family History     Problem Relation (Age of Onset)    Allergies Father    Asthma Father    Breast cancer Maternal Grandfather, Maternal Aunt        Social History Main Topics    Smoking status: Former Smoker     Packs/day: 0.25     Quit date: 2013    Smokeless  tobacco: Never Used    Alcohol use Yes    Drug use: No    Sexual activity: Yes     Partners: Male     Birth control/ protection: OCP      Comment:      Review of Systems   Constitutional: Positive for fatigue. Negative for chills and fever.   HENT: Negative for trouble swallowing and voice change.    Eyes: Negative for pain and redness.   Respiratory: Positive for shortness of breath. Negative for cough.    Cardiovascular: Negative for palpitations and leg swelling.   Gastrointestinal: Negative for nausea and vomiting.   Genitourinary: Negative for difficulty urinating and dysuria.   Musculoskeletal: Negative for neck pain and neck stiffness.   Skin: Negative for color change and rash.   Neurological: Negative for seizures and syncope.     Objective:     Vital Signs (Most Recent):  Temp: 97.8 °F (36.6 °C) (12/26/17 1217)  Pulse: 106 (12/26/17 1618)  Resp: (!) 25 (12/26/17 1618)  BP: 108/88 (12/26/17 1618)  SpO2: (!) 93 % (12/26/17 1618) Vital Signs (24h Range):  Temp:  [97.8 °F (36.6 °C)] 97.8 °F (36.6 °C)  Pulse:  [101-108] 106  Resp:  [20-30] 25  SpO2:  [93 %-100 %] 93 %  BP: ()/(72-92) 108/88     Weight: 47.2 kg (104 lb)  Body mass index is 18.42 kg/m².    Physical Exam   Constitutional: She is oriented to person, place, and time. She appears well-developed. No distress.   HENT:   Head: Normocephalic and atraumatic.   Eyes: Conjunctivae are normal. No scleral icterus.   Neck: Neck supple. No tracheal deviation present.   Cardiovascular: Regular rhythm and normal heart sounds.    Pulmonary/Chest: No respiratory distress. She has decreased breath sounds in the left lower field.   Abdominal: Soft. She exhibits no distension. There is no tenderness. There is no guarding.   Musculoskeletal: She exhibits no edema or tenderness.   Neurological: She is alert and oriented to person, place, and time.   Psychiatric: She has a normal mood and affect.   Nursing note and vitals reviewed.          Significant  Labs: All pertinent labs within the past 24 hours have been reviewed.    Significant Imaging: I have reviewed all pertinent imaging results/findings within the past 24 hours.   X-Ray Chest AP Portable 12/26/17: There is extensive diffuse left-sided groundglass density which is significantly worse compared to most recent exam dated 12/22/17. There is no pneumothorax. The remainder of the examination is unchanged.  X-Ray Chest 1 View 12/26/17: Notable interval decrease in size of the left-sided pleural effusion following thoracentesis. No significant pneumothorax.  Persistent patchy airspace disease bilaterally, particularly in the left mid to upper lung zone. No new focal consolidation identified.    Assessment/Plan:     Constipation    Give bisacodyl and polyethylene glycol.          Bilateral pneumonia    Continue respiratory fluoroquinolone started last admission.          Anxiety    Continue bupropion and lorazepam PRN.          Pleural effusion on left    Admission for chest tube placement  Acute hypoxemic respiratory failure  Malignant neoplasm of left lung stage 4  Malignant neoplasm metastatic to lymph node of axilla  Continue supplemental oxygen.  Continue prophylactic enoxaparin.  Pulmonology planning Pleur-X catheter.  Consult Dr. Quiros to see if any planned evaluations can be done while she is in the hospital.            VTE Risk Mitigation     None             Brian He MD  Department of Hospital Medicine   [facility]

## 2017-12-26 NOTE — SUBJECTIVE & OBJECTIVE
Past Medical History:   Diagnosis Date    Allergy     Angio-edema     Asthma     Recurrent upper respiratory infection (URI)     Urticaria        Past Surgical History:   Procedure Laterality Date     SECTION      2 occurences       Review of patient's allergies indicates:   Allergen Reactions    Doxycycline Rash    Cat/feline products        No current facility-administered medications on file prior to encounter.      Current Outpatient Prescriptions on File Prior to Encounter   Medication Sig    ascorbic acid (VITAMIN C) 500 MG tablet Take 500 mg by mouth once daily.    buPROPion (WELLBUTRIN XL) 300 MG 24 hr tablet Take 1 tablet (300 mg total) by mouth once daily.    diphenhydrAMINE (SOMINEX) 25 mg tablet Take 25 mg by mouth nightly as needed.    enoxaparin (LOVENOX) 40 mg/0.4 mL Syrg Inject 0.4 mLs (40 mg total) into the skin once daily.    hydrocodone-acetaminophen 7.5-325mg (NORCO) 7.5-325 mg per tablet Take 1 tablet by mouth every 6 (six) hours as needed for Pain.    levoFLOXacin (LEVAQUIN) 750 MG tablet Take 1 tablet (750 mg total) by mouth once daily.    LORazepam (ATIVAN) 1 MG tablet Take 1 tablet (1 mg total) by mouth every 6 (six) hours as needed for Anxiety.    multivitamin (ONE DAILY MULTIVITAMIN) per tablet Take 1 tablet by mouth once daily.    pseudoephedrine-guaifenesin  mg (MUCINEX D)  mg per tablet Take 1 tablet by mouth every 12 (twelve) hours.    VENTOLIN HFA 90 mcg/actuation inhaler INL 2 PFS PO QID PRF WHZ     Family History     Problem Relation (Age of Onset)    Allergies Father    Asthma Father    Breast cancer Maternal Grandfather, Maternal Aunt        Social History Main Topics    Smoking status: Former Smoker     Packs/day: 0.25     Quit date: 2013    Smokeless tobacco: Never Used    Alcohol use Yes    Drug use: No    Sexual activity: Yes     Partners: Male     Birth control/ protection: OCP      Comment:      Review of Systems    Constitutional: Positive for fatigue. Negative for chills and fever.   HENT: Negative for trouble swallowing and voice change.    Eyes: Negative for pain and redness.   Respiratory: Positive for shortness of breath. Negative for cough.    Cardiovascular: Negative for palpitations and leg swelling.   Gastrointestinal: Negative for nausea and vomiting.   Genitourinary: Negative for difficulty urinating and dysuria.   Musculoskeletal: Negative for neck pain and neck stiffness.   Skin: Negative for color change and rash.   Neurological: Negative for seizures and syncope.     Objective:     Vital Signs (Most Recent):  Temp: 97.8 °F (36.6 °C) (12/26/17 1217)  Pulse: 106 (12/26/17 1618)  Resp: (!) 25 (12/26/17 1618)  BP: 108/88 (12/26/17 1618)  SpO2: (!) 93 % (12/26/17 1618) Vital Signs (24h Range):  Temp:  [97.8 °F (36.6 °C)] 97.8 °F (36.6 °C)  Pulse:  [101-108] 106  Resp:  [20-30] 25  SpO2:  [93 %-100 %] 93 %  BP: ()/(72-92) 108/88     Weight: 47.2 kg (104 lb)  Body mass index is 18.42 kg/m².    Physical Exam   Constitutional: She is oriented to person, place, and time. She appears well-developed. No distress.   HENT:   Head: Normocephalic and atraumatic.   Eyes: Conjunctivae are normal. No scleral icterus.   Neck: Neck supple. No tracheal deviation present.   Cardiovascular: Regular rhythm and normal heart sounds.    Pulmonary/Chest: No respiratory distress. She has decreased breath sounds in the left lower field.   Abdominal: Soft. She exhibits no distension. There is no tenderness. There is no guarding.   Musculoskeletal: She exhibits no edema or tenderness.   Neurological: She is alert and oriented to person, place, and time.   Psychiatric: She has a normal mood and affect.   Nursing note and vitals reviewed.          Significant Labs: All pertinent labs within the past 24 hours have been reviewed.    Significant Imaging: I have reviewed all pertinent imaging results/findings within the past 24 hours.   X-Ray  Chest AP Portable 12/26/17: There is extensive diffuse left-sided groundglass density which is significantly worse compared to most recent exam dated 12/22/17. There is no pneumothorax. The remainder of the examination is unchanged.  X-Ray Chest 1 View 12/26/17: Notable interval decrease in size of the left-sided pleural effusion following thoracentesis. No significant pneumothorax.  Persistent patchy airspace disease bilaterally, particularly in the left mid to upper lung zone. No new focal consolidation identified.

## 2017-12-26 NOTE — ED PROVIDER NOTES
Encounter Date: 2017    SCRIBE #1 NOTE: I, Daniel Ware, am scribing for, and in the presence of, Dr. Thomas.       History     Chief Complaint   Patient presents with    Shortness of Breath     sob  sicne saturday after being d/c from this facility. pt has lung CA       2017 12:36 PM     Chief complaint: Shortness of Breath      Yen Falcon is a 42 y.o. female with asthma and recurrent URI who presents to the ED with an onset of shortness of breath with associated. This exacerbation began shortly prior to arrival and the patient adds that she was coughing up blood earlier in the week. The patient has a long history of pulmonary issues like pneumonia, recently diagnosed lung cancer in left lung, and a pleural effusion, which was drained this weekend. She has generally been uncomfortable and unable to sleep due to her lung problems. Her doctor is Dr. Stallings. She notes that she quit smoking in .         The history is provided by the patient and a parent.     Review of patient's allergies indicates:   Allergen Reactions    Doxycycline Rash    Cat/feline products      Past Medical History:   Diagnosis Date    Allergy     Angio-edema     Asthma     Recurrent upper respiratory infection (URI)     Urticaria      Past Surgical History:   Procedure Laterality Date     SECTION      2 occurences     Family History   Problem Relation Age of Onset    Allergies Father     Asthma Father     Breast cancer Maternal Grandfather     Breast cancer Maternal Aunt     Colon cancer Neg Hx     Ovarian cancer Neg Hx      Social History   Substance Use Topics    Smoking status: Former Smoker     Packs/day: 0.25     Quit date: 2013    Smokeless tobacco: Never Used    Alcohol use Yes     Review of Systems   Constitutional: Negative for activity change, appetite change and fever.        Unable to sleep.  Generalized discomfort.   HENT: Negative for congestion, ear pain and sore throat.     Eyes: Negative for discharge.   Respiratory: Positive for shortness of breath. Negative for cough and wheezing.    Cardiovascular: Negative for chest pain, palpitations and leg swelling.   Gastrointestinal: Negative for abdominal pain, diarrhea, nausea and vomiting.   Genitourinary: Negative for dysuria, frequency, urgency and vaginal discharge.   Musculoskeletal: Negative for back pain and neck pain.   Skin: Negative for rash.   Neurological: Negative for weakness, light-headedness and headaches.   Psychiatric/Behavioral: Negative for confusion and suicidal ideas.       Physical Exam     Initial Vitals   BP Pulse Resp Temp SpO2   12/26/17 1212 -- 12/26/17 1212 12/26/17 1217 12/26/17 1212   98/72  20 97.8 °F (36.6 °C) 96 %      MAP       12/26/17 1212       80.67         Physical Exam    Nursing note and vitals reviewed.  Constitutional: She appears well-developed and well-nourished.   HENT:   Head: Normocephalic and atraumatic.   Mouth/Throat: Oropharynx is clear and moist.   Eyes: Conjunctivae and EOM are normal. Pupils are equal, round, and reactive to light.   Neck: Normal range of motion. Neck supple.   Cardiovascular: Normal rate, regular rhythm, normal heart sounds and intact distal pulses. Exam reveals no gallop and no friction rub.    No murmur heard.  Pulmonary/Chest:   The left lung has absent breath sounds.    Abdominal: Soft. Bowel sounds are normal. She exhibits no distension. There is no tenderness. There is no rebound and no guarding.   Musculoskeletal: Normal range of motion. She exhibits no edema or tenderness.   Lymphadenopathy:     She has no cervical adenopathy.   Neurological: She is alert and oriented to person, place, and time. She has normal strength and normal reflexes.   Skin: Skin is warm and dry.   Psychiatric: She has a normal mood and affect. Her behavior is normal. Judgment and thought content normal.         ED Course   Procedures  Labs Reviewed - No data to display  EKG Readings:  (Independently Interpreted)   Initial Reading: No STEMI. Rhythm: Sinus Tachycardia. Heart Rate: 110. Ectopy: No Ectopy. Conduction: Normal. Clinical Impression: Sinus Tachycardia      Imaging Results          X-Ray Chest AP Portable (In process)                    Medical Decision Making:   History:   Old Medical Records: I decided to obtain old medical records.  Clinical Tests:   Lab Tests: Ordered and Reviewed  Radiological Study: Reviewed and Ordered  Medical Tests: Reviewed and Ordered  ED Management:  The patient presents with shortness of breath.  She has a pleural effusion in the left lung that involves the entire left lung cavity. She was recently diagnosed with lung CA and has not started chemotherapy yet, she has to get a PET scan for staging. She has an appt with Dr. Ishaan batres. She had the left pleural effusion tapped 5 days ago and she has reaccumulated the fluid. She was seen by pulmonary, Dr. Benito and his fellow will perform the pleuracentesis. She will be admitted to the hospital for oxygen and pleural catheter tomorrow or the next day.  Other:   I discussed test(s) with the performing physician.  I have discussed this case with another health care provider.       <> Summary of the Discussion: Case discussed with Dr. He and he will admit the patient.            Scribe Attestation:   Scribe #1: I performed the above scribed service and the documentation accurately describes the services I performed. I attest to the accuracy of the note.            ED Course      Clinical Impression:     1. Shortness of breath    2. Shortness of breath                                 Deisy Thoams MD  12/26/17 6066

## 2017-12-26 NOTE — TELEPHONE ENCOUNTER
Informed pt mother that this nurse will speak with  re: seeing pt for further orders, and let her know the plan asap. She verbalized understanding.     ----- Message from Gena Garcia sent at 12/26/2017  8:37 AM CST -----  Patient's mother, Meredith, called.    No. [phone]    Patient needs an order for a Pet Scan.   She would like to have it done today.

## 2017-12-26 NOTE — PROCEDURES
"Yen Falcon is a 42 y.o. female patient.    Temp: 97.8 °F (36.6 °C) (17 1217)  Pulse: 106 (17 1330)  Resp: 20 (17 1330)  BP: 98/72 (17 1212)  SpO2: 100 % (17 1330)  Weight: 47.2 kg (104 lb) (17 1212)  Height: 5' 3" (160 cm) (17 1212)       Thoracentesis  Date/Time: 2017 2:48 PM  Performed by: SPENCER CARABALLO  Authorized by: SPENCER CARABALLO   Pre-operative diagnosis: Left pleural effusion, malignant  Post-operative diagnosis: Left pleural effusion, malignant  Consent Done: Yes  Consent: Verbal consent obtained. Written consent obtained.  Risks and benefits: risks, benefits and alternatives were discussed  Consent given by: patient  Patient understanding: patient states understanding of the procedure being performed  Patient consent: the patient's understanding of the procedure matches consent given  Procedure consent: procedure consent matches procedure scheduled  Relevant documents: relevant documents present and verified  Test results: test results available and properly labeled  Site marked: the operative site was marked  Imaging studies: imaging studies available  Patient identity confirmed: , MRN and name  Time out: Immediately prior to procedure a "time out" was called to verify the correct patient, procedure, equipment, support staff and site/side marked as required.  Procedure purpose: therapeutic  Indications: pleural effusion  Preparation: Patient was prepped and draped in the usual sterile fashion.  Local anesthesia used: yes  Anesthesia: local infiltration    Anesthesia:  Local anesthesia used: yes  Local Anesthetic: lidocaine 1% without epinephrine  Anesthetic total: 10 mL  Patient sedated: no  Preparation: skin prepped with ChloraPrep  Patient position: sitting  Ultrasound guidance: yes  Location: left posterior  Intercostal space: 7th  Puncture method: over-the-needle catheter  Needle size: 18  Catheter size: 16 gauge  Number of attempts: " 1  Drainage amount: 1100 ml  Drainage characteristics: serous  Patient tolerance: Patient tolerated the procedure well with no immediate complications  Chest x-ray performed: yes  Technical procedures used: US guided  Complications: No  Estimated blood loss (mL): 0  Specimens: No  Implants: No          Branden Manzano  12/26/2017

## 2017-12-26 NOTE — ASSESSMENT & PLAN NOTE
Admission for chest tube placement  Acute hypoxemic respiratory failure  Malignant neoplasm of left lung stage 4  Malignant neoplasm metastatic to lymph node of axilla  Continue supplemental oxygen.  Continue prophylactic enoxaparin.  Pulmonology planning Pleur-X catheter.  Consult Dr. Quiros to see if any planned evaluations can be done while she is in the hospital.

## 2017-12-27 LAB
ANION GAP SERPL CALC-SCNC: 9 MMOL/L
BASOPHILS # BLD AUTO: ABNORMAL K/UL
BASOPHILS NFR BLD: 0 %
BUN SERPL-MCNC: 15 MG/DL
CALCIUM SERPL-MCNC: 8 MG/DL
CHLORIDE SERPL-SCNC: 102 MMOL/L
CO2 SERPL-SCNC: 22 MMOL/L
CREAT SERPL-MCNC: 0.6 MG/DL
DIFFERENTIAL METHOD: ABNORMAL
EOSINOPHIL # BLD AUTO: ABNORMAL K/UL
EOSINOPHIL NFR BLD: 5 %
ERYTHROCYTE [DISTWIDTH] IN BLOOD BY AUTOMATED COUNT: 12.9 %
EST. GFR  (AFRICAN AMERICAN): >60 ML/MIN/1.73 M^2
EST. GFR  (NON AFRICAN AMERICAN): >60 ML/MIN/1.73 M^2
GLUCOSE SERPL-MCNC: 117 MG/DL
HCT VFR BLD AUTO: 37.1 %
HGB BLD-MCNC: 11.8 G/DL
HYPOCHROMIA BLD QL SMEAR: ABNORMAL
LYMPHOCYTES # BLD AUTO: ABNORMAL K/UL
LYMPHOCYTES NFR BLD: 9 %
MCH RBC QN AUTO: 28.4 PG
MCHC RBC AUTO-ENTMCNC: 31.8 G/DL
MCV RBC AUTO: 89 FL
MONOCYTES # BLD AUTO: ABNORMAL K/UL
MONOCYTES NFR BLD: 4 %
MYELOCYTES NFR BLD MANUAL: 1 %
NEUTROPHILS NFR BLD: 77 %
NEUTS BAND NFR BLD MANUAL: 4 %
PLATELET # BLD AUTO: 404 K/UL
PLATELET BLD QL SMEAR: ABNORMAL
PMV BLD AUTO: 9.7 FL
POTASSIUM SERPL-SCNC: 4.5 MMOL/L
PROCALCITONIN SERPL IA-MCNC: 0.24 NG/ML
RBC # BLD AUTO: 4.16 M/UL
SODIUM SERPL-SCNC: 133 MMOL/L
WBC # BLD AUTO: 21.98 K/UL

## 2017-12-27 PROCEDURE — A4216 STERILE WATER/SALINE, 10 ML: HCPCS | Performed by: EMERGENCY MEDICINE

## 2017-12-27 PROCEDURE — 27100171 HC OXYGEN HIGH FLOW UP TO 24 HOURS

## 2017-12-27 PROCEDURE — 87205 SMEAR GRAM STAIN: CPT

## 2017-12-27 PROCEDURE — 27000489 HC PLEURY PATIENT STARTER KIT: Performed by: INTERNAL MEDICINE

## 2017-12-27 PROCEDURE — 84145 PROCALCITONIN (PCT): CPT

## 2017-12-27 PROCEDURE — 0W9B30Z DRAINAGE OF LEFT PLEURAL CAVITY WITH DRAINAGE DEVICE, PERCUTANEOUS APPROACH: ICD-10-PCS | Performed by: INTERNAL MEDICINE

## 2017-12-27 PROCEDURE — 85007 BL SMEAR W/DIFF WBC COUNT: CPT

## 2017-12-27 PROCEDURE — 63600175 PHARM REV CODE 636 W HCPCS: Performed by: INTERNAL MEDICINE

## 2017-12-27 PROCEDURE — 85027 COMPLETE CBC AUTOMATED: CPT

## 2017-12-27 PROCEDURE — 25000003 PHARM REV CODE 250: Performed by: INTERNAL MEDICINE

## 2017-12-27 PROCEDURE — 94640 AIRWAY INHALATION TREATMENT: CPT

## 2017-12-27 PROCEDURE — 25000242 PHARM REV CODE 250 ALT 637 W/ HCPCS: Performed by: HOSPITALIST

## 2017-12-27 PROCEDURE — 87070 CULTURE OTHR SPECIMN AEROBIC: CPT

## 2017-12-27 PROCEDURE — 94761 N-INVAS EAR/PLS OXIMETRY MLT: CPT

## 2017-12-27 PROCEDURE — C1729 CATH, DRAINAGE: HCPCS | Performed by: INTERNAL MEDICINE

## 2017-12-27 PROCEDURE — 25000003 PHARM REV CODE 250: Performed by: EMERGENCY MEDICINE

## 2017-12-27 PROCEDURE — 27000221 HC OXYGEN, UP TO 24 HOURS

## 2017-12-27 PROCEDURE — 63600175 PHARM REV CODE 636 W HCPCS: Performed by: HOSPITALIST

## 2017-12-27 PROCEDURE — 25000003 PHARM REV CODE 250: Performed by: HOSPITALIST

## 2017-12-27 PROCEDURE — 20000000 HC ICU ROOM

## 2017-12-27 PROCEDURE — 80048 BASIC METABOLIC PNL TOTAL CA: CPT

## 2017-12-27 PROCEDURE — 36415 COLL VENOUS BLD VENIPUNCTURE: CPT

## 2017-12-27 PROCEDURE — 32551 INSERTION OF CHEST TUBE: CPT | Mod: ,,, | Performed by: INTERNAL MEDICINE

## 2017-12-27 PROCEDURE — 99223 1ST HOSP IP/OBS HIGH 75: CPT | Mod: ,,, | Performed by: INTERNAL MEDICINE

## 2017-12-27 RX ORDER — DEXMEDETOMIDINE HYDROCHLORIDE 4 UG/ML
0.2 INJECTION, SOLUTION INTRAVENOUS CONTINUOUS
Status: DISCONTINUED | OUTPATIENT
Start: 2017-12-27 | End: 2017-12-27

## 2017-12-27 RX ORDER — HEPARIN 100 UNIT/ML
500 SYRINGE INTRAVENOUS
Status: CANCELLED | OUTPATIENT
Start: 2017-12-29

## 2017-12-27 RX ORDER — FENTANYL CITRATE 50 UG/ML
250 INJECTION, SOLUTION INTRAMUSCULAR; INTRAVENOUS ONCE AS NEEDED
Status: COMPLETED | OUTPATIENT
Start: 2017-12-27 | End: 2017-12-27

## 2017-12-27 RX ORDER — HEPARIN 100 UNIT/ML
500 SYRINGE INTRAVENOUS
Status: CANCELLED | OUTPATIENT
Start: 2017-12-28

## 2017-12-27 RX ORDER — HEPARIN 100 UNIT/ML
500 SYRINGE INTRAVENOUS
Status: DISCONTINUED | OUTPATIENT
Start: 2017-12-27 | End: 2018-01-08 | Stop reason: HOSPADM

## 2017-12-27 RX ORDER — SODIUM CHLORIDE 0.9 % (FLUSH) 0.9 %
10 SYRINGE (ML) INJECTION
Status: DISCONTINUED | OUTPATIENT
Start: 2017-12-27 | End: 2017-12-28 | Stop reason: SDUPTHER

## 2017-12-27 RX ORDER — LACTULOSE 10 G/15ML
15 SOLUTION ORAL 2 TIMES DAILY
Status: DISCONTINUED | OUTPATIENT
Start: 2017-12-27 | End: 2018-01-01

## 2017-12-27 RX ADMIN — MORPHINE SULFATE 4 MG: 10 INJECTION INTRAVENOUS at 01:12

## 2017-12-27 RX ADMIN — SODIUM CHLORIDE, PRESERVATIVE FREE 3 ML: 5 INJECTION INTRAVENOUS at 02:12

## 2017-12-27 RX ADMIN — SODIUM CHLORIDE: 9 INJECTION, SOLUTION INTRAVENOUS at 02:12

## 2017-12-27 RX ADMIN — FENTANYL CITRATE 25 MCG: 50 INJECTION, SOLUTION INTRAMUSCULAR; INTRAVENOUS at 10:12

## 2017-12-27 RX ADMIN — ENOXAPARIN SODIUM 40 MG: 100 INJECTION SUBCUTANEOUS at 05:12

## 2017-12-27 RX ADMIN — MORPHINE SULFATE 4 MG: 10 INJECTION INTRAVENOUS at 10:12

## 2017-12-27 RX ADMIN — IPRATROPIUM BROMIDE AND ALBUTEROL SULFATE 3 ML: .5; 3 SOLUTION RESPIRATORY (INHALATION) at 05:12

## 2017-12-27 RX ADMIN — MOXIFLOXACIN HYDROCHLORIDE 400 MG: 400 TABLET, FILM COATED ORAL at 09:12

## 2017-12-27 RX ADMIN — IPRATROPIUM BROMIDE AND ALBUTEROL SULFATE 3 ML: .5; 3 SOLUTION RESPIRATORY (INHALATION) at 01:12

## 2017-12-27 RX ADMIN — LIDOCAINE HYDROCHLORIDE 30 ML: 10; .005 INJECTION, SOLUTION EPIDURAL; INFILTRATION; INTRACAUDAL; PERINEURAL at 10:12

## 2017-12-27 RX ADMIN — ETOPOSIDE 142 MG: 20 INJECTION, SOLUTION, CONCENTRATE INTRAVENOUS at 04:12

## 2017-12-27 RX ADMIN — GUAIFENESIN 1200 MG: 600 TABLET, EXTENDED RELEASE ORAL at 09:12

## 2017-12-27 RX ADMIN — CARBOPLATIN 565 MG: 10 INJECTION, SOLUTION INTRAVENOUS at 03:12

## 2017-12-27 RX ADMIN — BUPROPION HYDROCHLORIDE 300 MG: 150 TABLET, FILM COATED, EXTENDED RELEASE ORAL at 09:12

## 2017-12-27 RX ADMIN — DEXMEDETOMIDINE HYDROCHLORIDE 0.2 MCG/KG/HR: 4 INJECTION, SOLUTION INTRAVENOUS at 10:12

## 2017-12-27 RX ADMIN — FENTANYL CITRATE 25 MCG: 50 INJECTION, SOLUTION INTRAMUSCULAR; INTRAVENOUS at 11:12

## 2017-12-27 NOTE — HPI
43 yo female recently diagnosed with Stage 4 lung carcinoma, underwent left thoracentesis for symptomatic malignant pleural effusion on 12/22/17 - now admitted with worsening dyspnea, has rapid accumulation of pleural fluid and new effusion on right.    She is in the ICU.    Biopsy report of right axillary LN/subcutaneous nodule done 12/18/17 showed poorly differentiated carcinoma compatible with primary lung adenocarcinoma.

## 2017-12-27 NOTE — PLAN OF CARE
TN met with pt, mother Meredith Maravilla   p #  [phone]   pt resides with mother   has home O2 per [facility].     per pt and mother -- requesting a portable O2 delivery system which is lighter and more portable.    TN will discuss this with [facility]     pt will start Chemo today   wants to continue seeing Dr. Qiuros;   sees Dr. Stauffer for Pulmonology.   at [facility].       Future Appointments  Date Time Provider Department Center   1/5/2018 12:45 PM [facility] PET CT1 LIMIT 500 LBS [facility] PET CT [facility]   1/5/2018 2:45 PM [facility] MRI1 [facility] MRI [facility]            12/27/17 1519   Discharge Assessment   Assessment Type Discharge Planning Assessment   Confirmed/corrected address and phone number on facesheet? Yes   Assessment information obtained from? Caregiver;Medical Record;Patient   Expected Length of Stay (days) 3   Communicated expected length of stay with patient/caregiver yes   Prior to hospitilization cognitive status: Alert/Oriented   Prior to hospitalization functional status: Independent   Current cognitive status: Alert/Oriented   Current Functional Status: Independent   Lives With parent(s)  (Mother - [phone] )   Able to Return to Prior Arrangements yes   Is patient able to care for self after discharge? Yes   Patient's perception of discharge disposition home or selfcare   Readmission Within The Last 30 Days (12/21-23/17  [facility] )   Patient currently being followed by outpatient case management? No   Patient currently receives any other outside agency services? No   Equipment Currently Used at Home oxygen  (Oxygen by [facility] )   Do you have any problems affording any of your prescribed medications? No   Is the patient taking medications as prescribed? yes   Does the patient have transportation home? Yes   Transportation Available family or friend will provide   Does the patient receive services at the [facility]? No   Discharge Plan A Home;Home with family   Discharge Plan B Home;Home with  family;Home Health   Patient/Family In Agreement With Plan yes

## 2017-12-27 NOTE — ED NOTES
Report received. Care assumed. Pt AAOx4. Pt family at BS. NAD. Pt VSS. Will continue to monitor.

## 2017-12-27 NOTE — ASSESSMENT & PLAN NOTE
· Secondary to malignancy  · Last thoracentesis 5 days ago (12/22/17) with 1L removed has reaccumulated quickly  · Thoracentesis performed in the ED with 1.2L removed  · PleurX catheter placed at bedside 12/27  · Has a right-sided effusion building up, may need thoracentesis and another PleurX in the near future

## 2017-12-27 NOTE — ASSESSMENT & PLAN NOTE
· Left lung hilar mass  · Right axillary LN biopsied at [facility] on 12/18 - final results obtained today and scanned into EPIC (look under media tab in chart review)  · Axillary node showed adenocarcinoma, likely pulmonary in origin  · Also has right rib met (palpable and painful to touch) and a pre-auricular node  · Rapidly accumulating pleural effusion on left (drained 1L on 12/22 and another 1.2L on 12/26), now with a moderate right-sided effusion  · Seen by Dr. Quiros - urgent need to start treatment - chemo today

## 2017-12-27 NOTE — PLAN OF CARE
Co that she is not getting any air in, states she feels tight, resp 24, unlabored, sat's 98% , still courser on rt and diminished on left, with fine insp wheeze left post upper lobe, treatment called for , on high flow 20l 40% Araceli RN at bedside since the start of chemotherapy, pt questioned  Re symptoms, pt rec last of chemo, Araceli does not feel that this is a reaction, will monitor closely, treatment given, and her symptoms were resolved does not feel tight anymore, sat's 98%

## 2017-12-27 NOTE — HPI
"Ms. Falcon is a 43yo female without significant PMHx recently diagnosed with lung cancer. Seen by pulmonary service 5 days ago after her first thoracentesis. At that time, only information from her right axillary node biopsy was "positive for carcinoma". She was seen by Dr. Quiros to set up follow-up appt and discharged with abx to cover a post-obstructive lung consolidation. Per patient, she felt extremely lethargic since discharge with SOB on minimal exertion. She returns to the ED today for in need of repeat thoracentesis.      "

## 2017-12-27 NOTE — PROGRESS NOTES
[facility] Hospital Medicine  Progress Note    Patient Name: Yen Falcon  MRN: [id number]  Patient Class: IP- Inpatient   Admission Date: 12/26/2017  Length of Stay: 1 days  Attending Physician: Brian He MD  Primary Care Provider: Lauren Goodman MD        Subjective:     Principal Problem:Admission for chest tube placement    HPI:  Yen Falcon is  42 y.o.  woman (LMP: 11/26/17) with a history of recurrent upper respiratory infection, asthma diagnosed in 2013, smoking that she quit when she was diagnosed with asthma, a left malignant pleural effusion since 10/30/17 due to stage 4 left lung carcinoma.  She is a former smoker, but quit when she was diagnosed with asthma.  She lives in [place], [place].   She was found to have a left pleural effusion at [facility] on 10/30/17 that was subsequently found to be due to metastatic lung cancer after a biopsy of a right axillary lymph node metastasis on 12/18/17.  She was hospitalized at [facility] from 12/22/17 to 12/23/17 after being transferred from [facility] for hypoxia due to the effusion.  Interventional Radiology performed thoracentesis removing 1.25 liters.  She was seen by Pulmonology (Dr. Cheko Batista and Dr. Branden Manzano) who recommended prophylactic enoxaparin.  She was seen by hematologist Dr. Vinnie Quiros to establish care.  She was prescribed home oxygen with expectation for the effusion to reaccumulate.   She returned to [facility] the day after [facility] with worsening shortness of breath confirmed to be due to effusion reaccumulation on chest x-ray.   Dr. Foote and Dr. Manzano drained another 1.1 liter and planned inpatient Pleur-X catheter placement, which would not be able to be done for another 2 days due to lack of staff.  She was readmitted to [facility] for this.    Hospital Course:  She  reported constipation since her last hospitalization so was given bisacodyl and polyethylene glycol.      Interval History: Just feels bad in general due to worsening cancer.  Anxious, wants to start treatment.    Review of Systems   Gastrointestinal: Positive for constipation. Negative for nausea and vomiting.   Neurological: Negative for seizures and syncope.   Psychiatric/Behavioral: The patient is nervous/anxious.      Objective:     Vital Signs (Most Recent):  Temp: 97.6 °F (36.4 °C) (12/27/17 0715)  Pulse: 102 (12/27/17 0740)  Resp: (!) 28 (12/27/17 0740)  BP: 110/72 (12/27/17 0600)  SpO2: (!) 94 % (12/27/17 0740) Vital Signs (24h Range):  Temp:  [97.5 °F (36.4 °C)-98.2 °F (36.8 °C)] 97.6 °F (36.4 °C)  Pulse:  [] 102  Resp:  [19-34] 28  SpO2:  [93 %-100 %] 94 %  BP: ()/(57-92) 110/72     Weight: 45 kg (99 lb 3.3 oz)  Body mass index is 17.57 kg/m².    Intake/Output Summary (Last 24 hours) at 12/27/17 0924  Last data filed at 12/26/17 2215   Gross per 24 hour   Intake           816.67 ml   Output             1200 ml   Net          -383.33 ml      Physical Exam   Constitutional: She is oriented to person, place, and time. She appears well-developed. She appears cachectic.   Pulmonary/Chest: Effort normal. No respiratory distress.   Abdominal: Soft. There is no tenderness.   Neurological: She is alert and oriented to person, place, and time.   Psychiatric: She has a normal mood and affect.   Nursing note and vitals reviewed.      Significant Labs: All pertinent labs within the past 24 hours have been reviewed.    Significant Imaging: I have reviewed all pertinent imaging results/findings within the past 24 hours.   X-Ray Chest 1 View 12/27/17: Cardiac monitoring leads overlie the chest. Cardiomediastinal silhouette appears unchanged. Diffuse multifocal bilateral opacities are again identified. There is increased opacification of the left hemithorax compared to most recent prior examination concerning  for combination of increasing left-sided pleural effusion with associated atelectasis and/or consolidation. No definite evidence of pneumothorax.    Assessment/Plan:      Constipation    Gavee bisacodyl.  Giving polyethylene glycol.  Add lactulose.          Bilateral pneumonia    Continue respiratory fluoroquinolone started last admission.          Anxiety    Continue bupropion and lorazepam PRN.          Pleural effusion on left    Admission for chest tube placement  Acute hypoxemic respiratory failure  Malignant neoplasm of left lung stage 4  Malignant neoplasm metastatic to lymph node of axilla  Continue supplemental oxygen.  Continue prophylactic enoxaparin.  Pulmonology planning Pleur-X catheter and getting latest pathology results from [place].  Per Dr. Quiros, get brain MRI and CT chest/abdomen/pelvis.            VTE Risk Mitigation         Ordered     enoxaparin injection 40 mg  Daily     Route:  Subcutaneous        12/26/17 2223     Medium Risk of VTE  Once      12/26/17 2223          Critical care time spent on the evaluation and treatment of severe organ dysfunction, review of pertinent labs and imaging studies, discussions with consulting providers and discussions with patient/family: 45 minutes.    Brian He MD  Department of Hospital Medicine   [facility]

## 2017-12-27 NOTE — PROGRESS NOTES
[facility]  Pulmonology  Progress Note    Patient Name: Yen Falcon  MRN: [id number]  Admission Date: 12/26/2017  Hospital Length of Stay: 1 days  Code Status: Full Code  Attending Provider: Brian He MD  Primary Care Provider: Lauren Goodman MD   Principal Problem: Admission for chest tube placement    Subjective:     Interval History: Breathing improved after thoracentesis in ED yesterday.     Objective:     Vital Signs (Most Recent):  Temp: 97.6 °F (36.4 °C) (12/27/17 0715)  Pulse: 100 (12/27/17 1340)  Resp: (!) 30 (12/27/17 1340)  BP: 110/72 (12/27/17 0600)  SpO2: 99 % (12/27/17 1340) Vital Signs (24h Range):  Temp:  [97.5 °F (36.4 °C)-98.2 °F (36.8 °C)] 97.6 °F (36.4 °C)  Pulse:  [] 100  Resp:  [19-34] 30  SpO2:  [93 %-100 %] 99 %  BP: ()/(57-92) 110/72     Weight: 45 kg (99 lb 3.3 oz)  Body mass index is 17.57 kg/m².      Intake/Output Summary (Last 24 hours) at 12/27/17 1402  Last data filed at 12/27/17 1249   Gross per 24 hour   Intake           826.44 ml   Output             1780 ml   Net          -953.56 ml       Physical Exam   Constitutional: She is oriented to person, place, and time. She appears well-developed.   Thin   HENT:   Head: Normocephalic and atraumatic.   Right pre-auricular node   Neck: Normal range of motion. Neck supple.   Cardiovascular: Normal rate and regular rhythm.    Pulmonary/Chest:       Decreased breath sounds on the left   4L NC oxygen     Abdominal: Soft.   Musculoskeletal: Normal range of motion.   Neurological: She is alert and oriented to person, place, and time.   Skin: Skin is warm and dry.   Psychiatric: She has a normal mood and affect.   Nursing note and vitals reviewed.      Vents:  Oxygen Concentration (%): 50 (12/27/17 1340)    Lines/Drains/Airways     Drain                 Chest Tube 12/27/17 1110 1 Left Other (Comment) less than 1 day          Peripheral Intravenous Line                 Peripheral IV - Single Lumen 12/26/17  1322 Left Antecubital 1 day         Peripheral IV - Single Lumen 12/27/17 1043 Right Forearm less than 1 day                Significant Labs:    CBC/Anemia Profile:    Recent Labs  Lab 12/26/17  1310 12/27/17  0747   WBC 22.70* 21.98*   HGB 12.2 11.8*   HCT 37.4 37.1   * 404*   MCV 89 89   RDW 12.9 12.9        Chemistries:    Recent Labs  Lab 12/26/17  1310 12/27/17  0747   * 133*   K 4.1 4.5    102   CO2 24 22*   BUN 15 15   CREATININE 0.6 0.6   CALCIUM 8.4* 8.0*   ALBUMIN 1.8*  --    PROT 6.0  --    BILITOT 0.3  --    ALKPHOS 131  --    *  --    AST 63*  --        All pertinent labs within the past 24 hours have been reviewed.    Significant Imaging:  I have reviewed all pertinent imaging results/findings within the past 24 hours.    Assessment/Plan:     Constipation    · Exquisitely sensitive to narcotics  · Maintenance of a good bowel regimen is important for this patient        Adenocarcinoma of left lung, stage 4    · Left lung hilar mass  · Right axillary LN biopsied at [facility] on 12/18 - final results obtained today and scanned into EPIC (look under media tab in chart review)  · Axillary node showed adenocarcinoma, likely pulmonary in origin  · Also has right rib met (palpable and painful to touch) and a pre-auricular node  · Rapidly accumulating pleural effusion on left (drained 1L on 12/22 and another 1.2L on 12/26), now with a moderate right-sided effusion  · Seen by Dr. Quiros - urgent need to start treatment - chemo today        Bilateral pneumonia    · Post-obstructive pneumonia from tumor burden  · Not toxic, finishing a FQ course        Acute hypoxemic respiratory failure    · Requiring 8L NC to maintain sats and comfort  · Switched to comfort flow        Pleural effusion on left    · Secondary to malignancy  · Last thoracentesis 5 days ago (12/22/17) with 1L removed has reaccumulated quickly  · Thoracentesis performed in the ED with 1.2L removed  · PleurX catheter  placed at bedside 12/27  · Has a right-sided effusion building up, may need thoracentesis and another PleurX in the near future               Branden Manzano MD  Pulmonology  [facility]    Pt seen and examined with Pulmonary/Critical Care team and this note reviewed and validated with the following additional comments:    Air hungry.  Did get relief last night from thoracentesis. We got records from [facility] and I discussed case with Dr. Quiros.  Will proceed with PleurX and start chemo today.    Иван Stahl MD  Phone [phone]

## 2017-12-27 NOTE — PROCEDURES
"Yen Falcon is a 42 y.o. female patient.    Temp: 97.6 °F (36.4 °C) (17 0715)  Pulse: 102 (17 0740)  Resp: (!) 28 (17 0740)  BP: 110/72 (17 0600)  SpO2: (!) 94 % (1740)  Weight: 45 kg (99 lb 3.3 oz) (17)  Height: 5' 3" (160 cm) (17)       PleurX Insertion  Date/Time: 2017 12:27 PM  Performed by: SPENCER CARABALLO  Authorized by: SPENCER CARABALLO   Post-operative diagnosis: Recurrent malignant pleural effusion  Pre-operative diagnosis: Same  Consent Done: Yes  Consent: Verbal consent obtained. Written consent obtained.  Risks and benefits: risks, benefits and alternatives were discussed  Consent given by: patient  Patient understanding: patient states understanding of the procedure being performed  Patient consent: the patient's understanding of the procedure matches consent given  Procedure consent: procedure consent matches procedure scheduled  Relevant documents: relevant documents present and verified  Test results: test results available and properly labeled  Site marked: the operative site was marked  Imaging studies: imaging studies available  Patient identity confirmed: , MRN and name  Time out: Immediately prior to procedure a "time out" was called to verify the correct patient, procedure, equipment, support staff and site/side marked as required.  Indications: pleural effusion  Patient sedated: yes  Sedation type: moderate (conscious) sedation  (See MAR for exact dosages of medications).  Sedatives: see MAR for details    Anesthesia:  Local Anesthetic: lidocaine 1% with epinephrine  Anesthetic total: 10 mL  Preparation: skin prepped with ChloraPrep  Placement location: left lateral  Scalpel size: 11  Tube size: 12 Fijian  Ultrasound guidance: yes  Tension pneumothorax heard: no  Tube connected to: suction  Drainage characteristics: clear  Drainage amount: 300 ml  Suture material: 0 silk  Dressing: 4x4 sterile gauze  Post-insertion " x-ray findings: tube in good position  Patient tolerance: Patient tolerated the procedure well with no immediate complications  Complications: No  Estimated blood loss (mL): 0  Specimens: No  Implants: No        Branden Manzano  12/27/2017     Pt seen and examined with Pulmonary/Critical Care team and this note reviewed and validated with the following additional comments:    Supervised by me.    Иван Stahl MD  Phone [phone]

## 2017-12-27 NOTE — CONSULTS
"[facility]  Pulmonology  Consult Note    Patient Name: Yen Falcon  MRN: [id number]  Admission Date: 2017  Hospital Length of Stay: 0 days  Code Status: Full Code  Attending Physician: Brian He MD  Primary Care Provider: Lauren Goodman MD   Principal Problem: Admission for chest tube placement    Consults  Subjective:     HPI:  Ms. Falcon is a 43yo female without significant PMHx recently diagnosed with lung cancer. Seen by pulmonary service 5 days ago after her first thoracentesis. At that time, only information from her right axillary node biopsy was "positive for carcinoma". She was seen by Dr. Quiros to set up follow-up appt and discharged with abx to cover a post-obstructive lung consolidation. Per patient, she felt extremely lethargic since discharge with SOB on minimal exertion. She returns to the ED today for in need of repeat thoracentesis.    Past Medical History:   Diagnosis Date    Allergy     Angio-edema     Asthma     Malignant neoplasm of left lung stage 4 2017    Recurrent upper respiratory infection (URI)     Urticaria        Past Surgical History:   Procedure Laterality Date     SECTION      2 occurences       Review of patient's allergies indicates:   Allergen Reactions    Doxycycline Rash    Cat/feline products        Family History     Problem Relation (Age of Onset)    Allergies Father    Asthma Father    Breast cancer Maternal Grandfather, Maternal Aunt        Social History Main Topics    Smoking status: Former Smoker     Packs/day: 0.25     Quit date: 2013    Smokeless tobacco: Never Used    Alcohol use Yes    Drug use: No    Sexual activity: Yes     Partners: Male     Birth control/ protection: OCP      Comment:          Review of Systems   Constitutional: Positive for fatigue and unexpected weight change.   HENT: Negative.    Eyes: Negative.    Respiratory: Positive for chest tightness and shortness of breath.  "   Gastrointestinal: Positive for constipation.   Endocrine: Negative.    Genitourinary: Negative.    Musculoskeletal: Negative.         Right rib pain   Skin: Negative.    Allergic/Immunologic: Negative.    Neurological: Negative.    Hematological: Negative.    Psychiatric/Behavioral: Negative.      Objective:     Vital Signs (Most Recent):  Temp: 97.8 °F (36.6 °C) (12/26/17 1217)  Pulse: 96 (12/26/17 1818)  Resp: (!) 21 (12/26/17 1818)  BP: 112/80 (12/26/17 1818)  SpO2: 98 % (12/26/17 1818) Vital Signs (24h Range):  Temp:  [97.8 °F (36.6 °C)] 97.8 °F (36.6 °C)  Pulse:  [] 96  Resp:  [19-30] 21  SpO2:  [93 %-100 %] 98 %  BP: ()/(67-92) 112/80     Weight: 47.2 kg (104 lb)  Body mass index is 18.42 kg/m².      Intake/Output Summary (Last 24 hours) at 12/26/17 2024  Last data filed at 12/26/17 1505   Gross per 24 hour   Intake                0 ml   Output             1200 ml   Net            -1200 ml       Physical Exam   Constitutional: She is oriented to person, place, and time. She appears well-developed.   Thin   HENT:   Head: Normocephalic and atraumatic.   Right pre-auricular node   Neck: Normal range of motion. Neck supple.   Cardiovascular: Normal rate and regular rhythm.    Pulmonary/Chest:       Decreased breath sounds on the left   4L NC oxygen     Abdominal: Soft.   Musculoskeletal: Normal range of motion.   Neurological: She is alert and oriented to person, place, and time.   Skin: Skin is warm and dry.   Psychiatric: She has a normal mood and affect.   Nursing note and vitals reviewed.      Vents:       Lines/Drains/Airways     Peripheral Intravenous Line                 Peripheral IV - Single Lumen 12/26/17 1322 Left Antecubital less than 1 day                Significant Labs:    CBC/Anemia Profile:    Recent Labs  Lab 12/26/17  1310   WBC 22.70*   HGB 12.2   HCT 37.4   *   MCV 89   RDW 12.9        Chemistries:    Recent Labs  Lab 12/26/17  1310   *   K 4.1      CO2 24    BUN 15   CREATININE 0.6   CALCIUM 8.4*   ALBUMIN 1.8*   PROT 6.0   BILITOT 0.3   ALKPHOS 131   *   AST 63*       All pertinent labs within the past 24 hours have been reviewed.    Significant Imaging:   I have reviewed all pertinent imaging results/findings within the past 24 hours.    Assessment/Plan:     Pleural effusion on left    · Secondary to malignancy  · Last thoracentesis 5 days ago (12/22/17) with 1L removed has reaccumulated quickly  · Thoracentesis performed at bedside today in the ED with 1.2L removed - pt tolerated well with improvement in symptoms  · Recommend admitting patient for PleurX catheter placement. Discussed with patient and mother in detail.  · Please admit to ICU and make NPO at midnight for morning procedure in ICU              Thank you for your consult. I will follow-up with patient. Please contact us if you have any additional questions.     Branden Manzano MD  Pulmonology  [facility]

## 2017-12-27 NOTE — HOSPITAL COURSE
She reported constipation since her last hospitalization so was given bisacodyl and polyethylene glycol.  Dr. Batista suspected she had adenocarcinoma and needed platinum based therapy.  Dr. Manzano obtained the biopsy results from [place], which showed that it was poorly differentiated adenocarcinoma.  They placed a PleurX catheter at bedside in the ICU.  After discussion with Dr. Quiros, it was decided she needed inpatient chemotherapy due to rapid progression.  She was started on carboplatin and etoposide and that was given on 12/30.  Brain MRI and CT of the chest/abdomen/pelvis showed extensive metastases including in the brain, with vasogenic edema.  Dr. Quiros started steroids.  She required high flow nasal cannula.  She was transferred out of the ICU on 12/29/17.  She continued to complain of a sensation of food building up in her upper abdomen.  Pulmonology felt her pancreatic metastasis may be involved, and recommended Gastroenterology evaluation. It was felt that she may have an ileus at that time and focus was on bowel regimen. She had BMs and was tolerating a liquid diet. She continued to have pain in her abdomen and MS Contin was started. Her respiratory status continued to wax and wane at times and caused significant anxiety. She was restarted on comfort flow and would go between comfort flow and HFNC at times. Her clinical status continued to worsen over the week and her pain increased requiring more frequent dosing of pain medication. She was ultimately started on a dilaudid gtt for pain control and decision was made to transition to inpatient hospice at [facility].

## 2017-12-27 NOTE — NURSING
Patient s/p respiratory treatment, expresses that she feels better, VSS, 128/83 p 104 rr 21 pulse ox 100%.  IV site patient with no s/s of reaction/infiltration noted.  Will continue to monitor.

## 2017-12-27 NOTE — PLAN OF CARE
Npo for insertion of drain  By pulmonary, not  IR, asked re meds, stated okay to give, gave all meds except the miralax and lactulose, will give later past the procedure

## 2017-12-27 NOTE — NURSING
Carboplatin initiated to right faorarm saline lock which was ok'd to use per Dr. Quiros for Carboplatin and Etoposide infusions.  Blood return noted prior to starting infusion.  Will monitor for s/s of any reactions.

## 2017-12-27 NOTE — NURSING
Patient to receive Cycle 1 Carboplatin & Etoposide today and then Etoposide tomorrow per Dr. Quiros.  Patient and mother informed and all in agreement with treatment plan.

## 2017-12-27 NOTE — ASSESSMENT & PLAN NOTE
41 yo female with aggressive Stage 4 poorly differentiated lung adenocarcinoma with rapidly progressive disease.    D/w with Dr. Foote - we are in agreement that she needs in-pt chemotherapy soon. Agree with Pleur-x catheter.    Discussed with pt. Recommended Carboplatin/Etoposide regimen. Went over indications, risks, benefits, side-effects and alternatives. Chemo consent signed.     Will check baseline scans MRI brain with contrast and CT chest/abd/pelvis with contrast during current hospital stay.    D/w nursing staff.

## 2017-12-27 NOTE — SUBJECTIVE & OBJECTIVE
Interval History: Breathing improved after thoracentesis in ED yesterday.     Objective:     Vital Signs (Most Recent):  Temp: 97.6 °F (36.4 °C) (12/27/17 0715)  Pulse: 100 (12/27/17 1340)  Resp: (!) 30 (12/27/17 1340)  BP: 110/72 (12/27/17 0600)  SpO2: 99 % (12/27/17 1340) Vital Signs (24h Range):  Temp:  [97.5 °F (36.4 °C)-98.2 °F (36.8 °C)] 97.6 °F (36.4 °C)  Pulse:  [] 100  Resp:  [19-34] 30  SpO2:  [93 %-100 %] 99 %  BP: ()/(57-92) 110/72     Weight: 45 kg (99 lb 3.3 oz)  Body mass index is 17.57 kg/m².      Intake/Output Summary (Last 24 hours) at 12/27/17 1402  Last data filed at 12/27/17 1249   Gross per 24 hour   Intake           826.44 ml   Output             1780 ml   Net          -953.56 ml       Physical Exam   Constitutional: She is oriented to person, place, and time. She appears well-developed.   Thin   HENT:   Head: Normocephalic and atraumatic.   Right pre-auricular node   Neck: Normal range of motion. Neck supple.   Cardiovascular: Normal rate and regular rhythm.    Pulmonary/Chest:       Decreased breath sounds on the left   4L NC oxygen     Abdominal: Soft.   Musculoskeletal: Normal range of motion.   Neurological: She is alert and oriented to person, place, and time.   Skin: Skin is warm and dry.   Psychiatric: She has a normal mood and affect.   Nursing note and vitals reviewed.      Vents:  Oxygen Concentration (%): 50 (12/27/17 1340)    Lines/Drains/Airways     Drain                 Chest Tube 12/27/17 1110 1 Left Other (Comment) less than 1 day          Peripheral Intravenous Line                 Peripheral IV - Single Lumen 12/26/17 1322 Left Antecubital 1 day         Peripheral IV - Single Lumen 12/27/17 1043 Right Forearm less than 1 day                Significant Labs:    CBC/Anemia Profile:    Recent Labs  Lab 12/26/17  1310 12/27/17  0747   WBC 22.70* 21.98*   HGB 12.2 11.8*   HCT 37.4 37.1   * 404*   MCV 89 89   RDW 12.9 12.9        Chemistries:    Recent Labs  Lab  12/26/17  1310 12/27/17  0747   * 133*   K 4.1 4.5    102   CO2 24 22*   BUN 15 15   CREATININE 0.6 0.6   CALCIUM 8.4* 8.0*   ALBUMIN 1.8*  --    PROT 6.0  --    BILITOT 0.3  --    ALKPHOS 131  --    *  --    AST 63*  --        All pertinent labs within the past 24 hours have been reviewed.    Significant Imaging:  I have reviewed all pertinent imaging results/findings within the past 24 hours.

## 2017-12-27 NOTE — ASSESSMENT & PLAN NOTE
· Secondary to malignancy  · Last thoracentesis 5 days ago (12/22/17) with 1L removed has reaccumulated quickly  · Thoracentesis performed at bedside today in the ED with 1.2L removed - pt tolerated well with improvement in symptoms  · Recommend admitting patient for PleurX catheter placement. Discussed with patient and mother in detail.  · Please admit to ICU and make NPO at midnight for morning procedure in ICU

## 2017-12-27 NOTE — PLAN OF CARE
1020 preparing for insertion of PleurX drain, another iv started rt ant upper arm, x 2 attempts, Dr Manzano And Haylie here to do the procedure, consent signed, and time out done per  E ICU, precedex hung 0.2 mcg/kg/hr prior, pt positioned on rt side, started to cough, and felt like she could not breathe, Dr Manzano inc 02 to 12l nc already humidified,  precedex ordered to be inc  To 0.4 mcg

## 2017-12-27 NOTE — PLAN OF CARE
Araceli Egan RN here, preparing to initiate chemotherapy, pt to have treatment today and tomorrow and then outpt

## 2017-12-27 NOTE — NURSING
Etoposide complete.  Patient without s/s of transfusion reaction.  Resting comfortably in bed.  VSS.  IV site patent, flushed, no s/s of infiltration noted, patient states that IV site feels fine.  Report given to Laura Foss and intake charted.

## 2017-12-27 NOTE — NURSING
Patient and patient mother educated on Carboplatin and Etoposide.  Handouts from [employer] given and read aloud for educational purposes.  Patient was able to teachback self care tips.

## 2017-12-27 NOTE — ASSESSMENT & PLAN NOTE
Admission for chest tube placement  Acute hypoxemic respiratory failure  Malignant neoplasm of left lung stage 4  Malignant neoplasm metastatic to lymph node of axilla  Continue supplemental oxygen.  Continue prophylactic enoxaparin.  Pulmonology planning Pleur-X catheter and getting latest pathology results from [place].  Per Dr. Quiros, get brain MRI and CT chest/abdomen/pelvis.

## 2017-12-27 NOTE — NURSING
Carboplatin complete.  No s/s of reaction noted.  Etoposide infusion initiated to right forearm.  Site flushed prior to initiation, blood return noted.  VSS Will continue to monitor.

## 2017-12-27 NOTE — PLAN OF CARE
1200cc warm soap suds enema given, able to take all the fluid but not able to hold it very long      1254 precedex off

## 2017-12-27 NOTE — ASSESSMENT & PLAN NOTE
· Exquisitely sensitive to narcotics  · Maintenance of a good bowel regimen is important for this patient

## 2017-12-27 NOTE — PLAN OF CARE
Problem: Patient Care Overview  Goal: Plan of Care Review  Outcome: Ongoing (interventions implemented as appropriate)  PleurX drain inserted to left chest, to drain fluid as needed, pt to go home with drain, Mom, and pt to learn how to drain the catheter , pt rec first dose of chemo today, and will rec in am, then in 3 weeks, Rosie , the nurse administering the medication, gave pt and mom some teaching on drugs , and side affects, literature given to them, pt placed on high flow for comfort , felt that she was not getting enough 02, sat's did not drop, will wean as florencio, pt for ct abd, pelvis, and chest am, mri rachael

## 2017-12-27 NOTE — SUBJECTIVE & OBJECTIVE
Oncology Treatment Plan:   [No treatment plan]    Medications:  Continuous Infusions:   dexmedetomidine (PRECEDEX) infusion       Scheduled Meds:   buPROPion  300 mg Oral Daily    enoxaparin  40 mg Subcutaneous Daily    guaiFENesin  1,200 mg Oral BID    lactulose  15 g Oral BID    lidocaine-EPINEPHrine (PF) 1%-1:200,000  30 mL Intradermal Once    moxifloxacin  400 mg Oral Daily    polyethylene glycol  17 g Oral BID    sodium chloride 0.9%  3 mL Intravenous Q8H     PRN Meds:acetaminophen, albuterol-ipratropium 2.5mg-0.5mg/3mL, aluminum-magnesium hydroxide-simethicone, diphenhydrAMINE, fentaNYL, hydrocodone-acetaminophen 7.5-325mg, LORazepam, morphine, naproxen sodium, ondansetron, sodium chloride 0.9%     Review of patient's allergies indicates:   Allergen Reactions    Doxycycline Rash    Cat/feline products         Past Medical History:   Diagnosis Date    Allergy     Angio-edema     Asthma     Malignant neoplasm of left lung stage 4 2017    Recurrent upper respiratory infection (URI)     Urticaria      Past Surgical History:   Procedure Laterality Date     SECTION      2 occurences     Family History     Problem Relation (Age of Onset)    Allergies Father    Asthma Father    Breast cancer Maternal Grandfather, Maternal Aunt        Social History Main Topics    Smoking status: Former Smoker     Packs/day: 0.25     Quit date: 2013    Smokeless tobacco: Never Used    Alcohol use Yes    Drug use: No    Sexual activity: Yes     Partners: Male     Birth control/ protection: OCP      Comment:        Review of Systems   Constitutional: Positive for activity change, fatigue and unexpected weight change. Negative for fever.   HENT: Negative for mouth sores and nosebleeds.    Eyes: Negative for photophobia and pain.   Respiratory: Positive for cough and shortness of breath. Negative for wheezing.    Cardiovascular: Negative for chest pain and palpitations.   Gastrointestinal:  Negative for abdominal pain and vomiting.   Genitourinary: Negative for flank pain and hematuria.   Musculoskeletal: Positive for arthralgias. Negative for neck pain and neck stiffness.   Skin: Negative for rash and wound.   Neurological: Negative for seizures and syncope.   Hematological: Negative for adenopathy. Does not bruise/bleed easily.   Psychiatric/Behavioral: Negative for behavioral problems and confusion. The patient is nervous/anxious.    All other systems reviewed and are negative.    Objective:     Vital Signs (Most Recent):  Temp: 97.6 °F (36.4 °C) (12/27/17 0715)  Pulse: 102 (12/27/17 0740)  Resp: (!) 28 (12/27/17 0740)  BP: 110/72 (12/27/17 0600)  SpO2: (!) 94 % (12/27/17 0740) Vital Signs (24h Range):  Temp:  [97.5 °F (36.4 °C)-98.2 °F (36.8 °C)] 97.6 °F (36.4 °C)  Pulse:  [] 102  Resp:  [19-34] 28  SpO2:  [93 %-100 %] 94 %  BP: ()/(57-92) 110/72     Weight: 45 kg (99 lb 3.3 oz)  Body mass index is 17.57 kg/m².  Body surface area is 1.41 meters squared.      Intake/Output Summary (Last 24 hours) at 12/27/17 1026  Last data filed at 12/26/17 2215   Gross per 24 hour   Intake           816.67 ml   Output             1200 ml   Net          -383.33 ml       Physical Exam   Constitutional: She is cooperative. She does not appear ill. No distress.   HENT:   Head: Head is without laceration, without right periorbital erythema and without left periorbital erythema.   Nose: No epistaxis.   Mouth/Throat: Oropharynx is clear and moist. No oropharyngeal exudate. No tonsillar exudate.   Eyes: Conjunctivae are normal.   Neck: Neck supple. No thyroid mass and no thyromegaly present.   Cardiovascular: Normal rate and regular rhythm.  Exam reveals no friction rub.    Pulmonary/Chest: No accessory muscle usage or stridor. No tachypnea. No respiratory distress. She has decreased breath sounds in the right lower field, the left middle field and the left lower field. Chest wall is not dull to percussion.  She exhibits no tenderness.   Abdominal: Soft. She exhibits no distension, no ascites and no mass. There is no hepatosplenomegaly.   Musculoskeletal: She exhibits no edema.   Lymphadenopathy:        Head (right side): No submental and no submandibular adenopathy present.        Head (left side): No submental and no submandibular adenopathy present.     She has no cervical adenopathy.     She has no axillary adenopathy.   Neurological: She is alert. She has normal strength. No cranial nerve deficit.   Skin: No bruising, no petechiae and no rash noted. She is not diaphoretic.   Psychiatric: Her behavior is normal. Thought content normal. Cognition and memory are normal. She does not exhibit a depressed mood.   Vitals reviewed.      Significant Labs:   All pertinent labs from the last 24 hours have been reviewed.    Diagnostic Results:  I have reviewed all pertinent imaging results/findings within the past 24 hours.

## 2017-12-27 NOTE — CONSULTS
[facility]  Hematology/Oncology  Consult Note    Patient Name: Yen Falcon  MRN: [id number]  Admission Date: 2017  Hospital Length of Stay: 1 days  Code Status: Full Code   Attending Provider: Brian He MD  Consulting Provider: Vinnie Quiros MD  Primary Care Physician: Lauren Goodmna MD  Principal Problem:Admission for chest tube placement    Consults  Subjective:     HPI:  43 yo female recently diagnosed with Stage 4 lung carcinoma, underwent left thoracentesis for symptomatic malignant pleural effusion on 17 - now admitted with worsening dyspnea, has rapid accumulation of pleural fluid and new effusion on right.    She is in the ICU.    Biopsy report of right axillary LN/subcutaneous nodule done 17 showed poorly differentiated carcinoma compatible with primary lung adenocarcinoma.    Oncology Treatment Plan:   [No treatment plan]    Medications:  Continuous Infusions:   dexmedetomidine (PRECEDEX) infusion       Scheduled Meds:   buPROPion  300 mg Oral Daily    enoxaparin  40 mg Subcutaneous Daily    guaiFENesin  1,200 mg Oral BID    lactulose  15 g Oral BID    lidocaine-EPINEPHrine (PF) 1%-1:200,000  30 mL Intradermal Once    moxifloxacin  400 mg Oral Daily    polyethylene glycol  17 g Oral BID    sodium chloride 0.9%  3 mL Intravenous Q8H     PRN Meds:acetaminophen, albuterol-ipratropium 2.5mg-0.5mg/3mL, aluminum-magnesium hydroxide-simethicone, diphenhydrAMINE, fentaNYL, hydrocodone-acetaminophen 7.5-325mg, LORazepam, morphine, naproxen sodium, ondansetron, sodium chloride 0.9%     Review of patient's allergies indicates:   Allergen Reactions    Doxycycline Rash    Cat/feline products         Past Medical History:   Diagnosis Date    Allergy     Angio-edema     Asthma     Malignant neoplasm of left lung stage 4 2017    Recurrent upper respiratory infection (URI)     Urticaria      Past Surgical History:   Procedure Laterality Date      SECTION      2 occurences     Family History     Problem Relation (Age of Onset)    Allergies Father    Asthma Father    Breast cancer Maternal Grandfather, Maternal Aunt        Social History Main Topics    Smoking status: Former Smoker     Packs/day: 0.25     Quit date: 9/20/2013    Smokeless tobacco: Never Used    Alcohol use Yes    Drug use: No    Sexual activity: Yes     Partners: Male     Birth control/ protection: OCP      Comment:        Review of Systems   Constitutional: Positive for activity change, fatigue and unexpected weight change. Negative for fever.   HENT: Negative for mouth sores and nosebleeds.    Eyes: Negative for photophobia and pain.   Respiratory: Positive for cough and shortness of breath. Negative for wheezing.    Cardiovascular: Negative for chest pain and palpitations.   Gastrointestinal: Negative for abdominal pain and vomiting.   Genitourinary: Negative for flank pain and hematuria.   Musculoskeletal: Positive for arthralgias. Negative for neck pain and neck stiffness.   Skin: Negative for rash and wound.   Neurological: Negative for seizures and syncope.   Hematological: Negative for adenopathy. Does not bruise/bleed easily.   Psychiatric/Behavioral: Negative for behavioral problems and confusion. The patient is nervous/anxious.    All other systems reviewed and are negative.    Objective:     Vital Signs (Most Recent):  Temp: 97.6 °F (36.4 °C) (12/27/17 0715)  Pulse: 102 (12/27/17 0740)  Resp: (!) 28 (12/27/17 0740)  BP: 110/72 (12/27/17 0600)  SpO2: (!) 94 % (12/27/17 0740) Vital Signs (24h Range):  Temp:  [97.5 °F (36.4 °C)-98.2 °F (36.8 °C)] 97.6 °F (36.4 °C)  Pulse:  [] 102  Resp:  [19-34] 28  SpO2:  [93 %-100 %] 94 %  BP: ()/(57-92) 110/72     Weight: 45 kg (99 lb 3.3 oz)  Body mass index is 17.57 kg/m².  Body surface area is 1.41 meters squared.      Intake/Output Summary (Last 24 hours) at 12/27/17 1026  Last data filed at 12/26/17 3565   Gross per 24  hour   Intake           816.67 ml   Output             1200 ml   Net          -383.33 ml       Physical Exam   Constitutional: She is cooperative. She does not appear ill. No distress.   HENT:   Head: Head is without laceration, without right periorbital erythema and without left periorbital erythema.   Nose: No epistaxis.   Mouth/Throat: Oropharynx is clear and moist. No oropharyngeal exudate. No tonsillar exudate.   Eyes: Conjunctivae are normal.   Neck: Neck supple. No thyroid mass and no thyromegaly present.   Cardiovascular: Normal rate and regular rhythm.  Exam reveals no friction rub.    Pulmonary/Chest: No accessory muscle usage or stridor. No tachypnea. No respiratory distress. She has decreased breath sounds in the right lower field, the left middle field and the left lower field. Chest wall is not dull to percussion. She exhibits no tenderness.   Abdominal: Soft. She exhibits no distension, no ascites and no mass. There is no hepatosplenomegaly.   Musculoskeletal: She exhibits no edema.   Lymphadenopathy:        Head (right side): No submental and no submandibular adenopathy present.        Head (left side): No submental and no submandibular adenopathy present.     She has no cervical adenopathy.     She has no axillary adenopathy.   Neurological: She is alert. She has normal strength. No cranial nerve deficit.   Skin: No bruising, no petechiae and no rash noted. She is not diaphoretic.   Psychiatric: Her behavior is normal. Thought content normal. Cognition and memory are normal. She does not exhibit a depressed mood.   Vitals reviewed.      Significant Labs:   All pertinent labs from the last 24 hours have been reviewed.    Diagnostic Results:  I have reviewed all pertinent imaging results/findings within the past 24 hours.    Assessment/Plan:     Malignant neoplasm of left lung stage 4    43 yo female with aggressive Stage 4 poorly differentiated lung adenocarcinoma with rapidly progressive  disease.    D/w with Dr. Foote - we are in agreement that she needs in-pt chemotherapy soon. Agree with Pleur-x catheter.    Discussed with pt. Recommended Carboplatin/Etoposide regimen. Went over indications, risks, benefits, side-effects and alternatives. Chemo consent signed.     Will check baseline scans MRI brain with contrast and CT chest/abd/pelvis with contrast during current hospital stay.    D/w nursing staff.            Vinnie Quiros MD  Hematology/Oncology  [facility]

## 2017-12-27 NOTE — SUBJECTIVE & OBJECTIVE
Past Medical History:   Diagnosis Date    Allergy     Angio-edema     Asthma     Malignant neoplasm of left lung stage 4 2017    Recurrent upper respiratory infection (URI)     Urticaria        Past Surgical History:   Procedure Laterality Date     SECTION      2 occurences       Review of patient's allergies indicates:   Allergen Reactions    Doxycycline Rash    Cat/feline products        Family History     Problem Relation (Age of Onset)    Allergies Father    Asthma Father    Breast cancer Maternal Grandfather, Maternal Aunt        Social History Main Topics    Smoking status: Former Smoker     Packs/day: 0.25     Quit date: 2013    Smokeless tobacco: Never Used    Alcohol use Yes    Drug use: No    Sexual activity: Yes     Partners: Male     Birth control/ protection: OCP      Comment:          Review of Systems   Constitutional: Positive for fatigue and unexpected weight change.   HENT: Negative.    Eyes: Negative.    Respiratory: Positive for chest tightness and shortness of breath.    Gastrointestinal: Positive for constipation.   Endocrine: Negative.    Genitourinary: Negative.    Musculoskeletal: Negative.         Right rib pain   Skin: Negative.    Allergic/Immunologic: Negative.    Neurological: Negative.    Hematological: Negative.    Psychiatric/Behavioral: Negative.      Objective:     Vital Signs (Most Recent):  Temp: 97.8 °F (36.6 °C) (17 1217)  Pulse: 96 (17)  Resp: (!) 21 (17)  BP: 112/80 (17)  SpO2: 98 % (17) Vital Signs (24h Range):  Temp:  [97.8 °F (36.6 °C)] 97.8 °F (36.6 °C)  Pulse:  [] 96  Resp:  [19-30] 21  SpO2:  [93 %-100 %] 98 %  BP: ()/(67-92) 112/80     Weight: 47.2 kg (104 lb)  Body mass index is 18.42 kg/m².      Intake/Output Summary (Last 24 hours) at 17  Last data filed at 17 1505   Gross per 24 hour   Intake                0 ml   Output             1200 ml   Net             -1200 ml       Physical Exam   Constitutional: She is oriented to person, place, and time. She appears well-developed.   Thin   HENT:   Head: Normocephalic and atraumatic.   Right pre-auricular node   Neck: Normal range of motion. Neck supple.   Cardiovascular: Normal rate and regular rhythm.    Pulmonary/Chest:       Decreased breath sounds on the left   4L NC oxygen     Abdominal: Soft.   Musculoskeletal: Normal range of motion.   Neurological: She is alert and oriented to person, place, and time.   Skin: Skin is warm and dry.   Psychiatric: She has a normal mood and affect.   Nursing note and vitals reviewed.      Vents:       Lines/Drains/Airways     Peripheral Intravenous Line                 Peripheral IV - Single Lumen 12/26/17 1322 Left Antecubital less than 1 day                Significant Labs:    CBC/Anemia Profile:    Recent Labs  Lab 12/26/17  1310   WBC 22.70*   HGB 12.2   HCT 37.4   *   MCV 89   RDW 12.9        Chemistries:    Recent Labs  Lab 12/26/17  1310   *   K 4.1      CO2 24   BUN 15   CREATININE 0.6   CALCIUM 8.4*   ALBUMIN 1.8*   PROT 6.0   BILITOT 0.3   ALKPHOS 131   *   AST 63*       All pertinent labs within the past 24 hours have been reviewed.    Significant Imaging:   I have reviewed all pertinent imaging results/findings within the past 24 hours.

## 2017-12-27 NOTE — PLAN OF CARE
Dr Manzano capped the pleurX  suction discontinued to pleurX capped, will hold the miralax and lactulose until the chemo is completed, concerned about possible nausea and vomiting, will give enema for bowels

## 2017-12-27 NOTE — SUBJECTIVE & OBJECTIVE
Interval History: Just feels bad in general due to worsening cancer.  Anxious, wants to start treatment.    Review of Systems   Gastrointestinal: Positive for constipation. Negative for nausea and vomiting.   Neurological: Negative for seizures and syncope.   Psychiatric/Behavioral: The patient is nervous/anxious.      Objective:     Vital Signs (Most Recent):  Temp: 97.6 °F (36.4 °C) (12/27/17 0715)  Pulse: 102 (12/27/17 0740)  Resp: (!) 28 (12/27/17 0740)  BP: 110/72 (12/27/17 0600)  SpO2: (!) 94 % (12/27/17 0740) Vital Signs (24h Range):  Temp:  [97.5 °F (36.4 °C)-98.2 °F (36.8 °C)] 97.6 °F (36.4 °C)  Pulse:  [] 102  Resp:  [19-34] 28  SpO2:  [93 %-100 %] 94 %  BP: ()/(57-92) 110/72     Weight: 45 kg (99 lb 3.3 oz)  Body mass index is 17.57 kg/m².    Intake/Output Summary (Last 24 hours) at 12/27/17 0924  Last data filed at 12/26/17 2215   Gross per 24 hour   Intake           816.67 ml   Output             1200 ml   Net          -383.33 ml      Physical Exam   Constitutional: She is oriented to person, place, and time. She appears well-developed. She appears cachectic.   Pulmonary/Chest: Effort normal. No respiratory distress.   Abdominal: Soft. There is no tenderness.   Neurological: She is alert and oriented to person, place, and time.   Psychiatric: She has a normal mood and affect.   Nursing note and vitals reviewed.      Significant Labs: All pertinent labs within the past 24 hours have been reviewed.    Significant Imaging: I have reviewed all pertinent imaging results/findings within the past 24 hours.   X-Ray Chest 1 View 12/27/17: Cardiac monitoring leads overlie the chest. Cardiomediastinal silhouette appears unchanged. Diffuse multifocal bilateral opacities are again identified. There is increased opacification of the left hemithorax compared to most recent prior examination concerning for combination of increasing left-sided pleural effusion with associated atelectasis and/or  consolidation. No definite evidence of pneumothorax.

## 2017-12-27 NOTE — PLAN OF CARE
pleurX catheter  Inserted to left chest, by Dr Manzano and Haylie , sutured in foam drain sponge and tegaderm placed, no bleeding at insertion site, pleurx placed to 20cm neg suction, returned 280 cc sero sang drainage,

## 2017-12-28 LAB — PATH INTERP FLD-IMP: NORMAL

## 2017-12-28 PROCEDURE — 20000000 HC ICU ROOM

## 2017-12-28 PROCEDURE — 63600175 PHARM REV CODE 636 W HCPCS: Performed by: INTERNAL MEDICINE

## 2017-12-28 PROCEDURE — A4216 STERILE WATER/SALINE, 10 ML: HCPCS | Performed by: INTERNAL MEDICINE

## 2017-12-28 PROCEDURE — 25000242 PHARM REV CODE 250 ALT 637 W/ HCPCS: Performed by: HOSPITALIST

## 2017-12-28 PROCEDURE — A9585 GADOBUTROL INJECTION: HCPCS | Performed by: HOSPITALIST

## 2017-12-28 PROCEDURE — 27100171 HC OXYGEN HIGH FLOW UP TO 24 HOURS

## 2017-12-28 PROCEDURE — 25500020 PHARM REV CODE 255: Performed by: HOSPITALIST

## 2017-12-28 PROCEDURE — 25000003 PHARM REV CODE 250: Performed by: INTERNAL MEDICINE

## 2017-12-28 PROCEDURE — 27100092 HC HIGH FLOW DELIVERY CANNULA

## 2017-12-28 PROCEDURE — 97161 PT EVAL LOW COMPLEX 20 MIN: CPT

## 2017-12-28 PROCEDURE — 25000003 PHARM REV CODE 250: Performed by: EMERGENCY MEDICINE

## 2017-12-28 PROCEDURE — 25000003 PHARM REV CODE 250: Performed by: HOSPITALIST

## 2017-12-28 PROCEDURE — A4216 STERILE WATER/SALINE, 10 ML: HCPCS | Performed by: EMERGENCY MEDICINE

## 2017-12-28 PROCEDURE — G8978 MOBILITY CURRENT STATUS: HCPCS | Mod: CJ

## 2017-12-28 PROCEDURE — 63600175 PHARM REV CODE 636 W HCPCS: Performed by: HOSPITALIST

## 2017-12-28 PROCEDURE — 94761 N-INVAS EAR/PLS OXIMETRY MLT: CPT

## 2017-12-28 PROCEDURE — G8979 MOBILITY GOAL STATUS: HCPCS | Mod: CI

## 2017-12-28 PROCEDURE — 99233 SBSQ HOSP IP/OBS HIGH 50: CPT | Mod: ,,, | Performed by: INTERNAL MEDICINE

## 2017-12-28 PROCEDURE — 94640 AIRWAY INHALATION TREATMENT: CPT

## 2017-12-28 RX ORDER — PANTOPRAZOLE SODIUM 40 MG/1
40 TABLET, DELAYED RELEASE ORAL DAILY
Status: DISCONTINUED | OUTPATIENT
Start: 2017-12-28 | End: 2018-01-06

## 2017-12-28 RX ORDER — SODIUM CHLORIDE 0.9 % (FLUSH) 0.9 %
10 SYRINGE (ML) INJECTION
Status: DISCONTINUED | OUTPATIENT
Start: 2017-12-28 | End: 2018-01-08 | Stop reason: HOSPADM

## 2017-12-28 RX ORDER — DEXAMETHASONE SODIUM PHOSPHATE 4 MG/ML
4 INJECTION, SOLUTION INTRA-ARTICULAR; INTRALESIONAL; INTRAMUSCULAR; INTRAVENOUS; SOFT TISSUE EVERY 6 HOURS
Status: DISCONTINUED | OUTPATIENT
Start: 2017-12-28 | End: 2017-12-29

## 2017-12-28 RX ORDER — GADOBUTROL 604.72 MG/ML
4.5 INJECTION INTRAVENOUS
Status: COMPLETED | OUTPATIENT
Start: 2017-12-28 | End: 2017-12-28

## 2017-12-28 RX ORDER — SIMETHICONE 125 MG
125 TABLET,CHEWABLE ORAL ONCE
Status: COMPLETED | OUTPATIENT
Start: 2017-12-28 | End: 2017-12-28

## 2017-12-28 RX ADMIN — DEXAMETHASONE SODIUM PHOSPHATE 4 MG: 4 INJECTION, SOLUTION INTRAMUSCULAR; INTRAVENOUS at 01:12

## 2017-12-28 RX ADMIN — GADOBUTROL 4.5 ML: 604.72 INJECTION INTRAVENOUS at 09:12

## 2017-12-28 RX ADMIN — PANTOPRAZOLE SODIUM 40 MG: 40 TABLET, DELAYED RELEASE ORAL at 06:12

## 2017-12-28 RX ADMIN — DEXAMETHASONE SODIUM PHOSPHATE 4 MG: 4 INJECTION, SOLUTION INTRAMUSCULAR; INTRAVENOUS at 06:12

## 2017-12-28 RX ADMIN — POLYETHYLENE GLYCOL 3350 17 G: 17 POWDER, FOR SOLUTION ORAL at 10:12

## 2017-12-28 RX ADMIN — SODIUM CHLORIDE, PRESERVATIVE FREE 3 ML: 5 INJECTION INTRAVENOUS at 02:12

## 2017-12-28 RX ADMIN — LACTULOSE 15 G: 20 SOLUTION ORAL at 09:12

## 2017-12-28 RX ADMIN — ETOPOSIDE 142 MG: 20 INJECTION INTRAVENOUS at 12:12

## 2017-12-28 RX ADMIN — IPRATROPIUM BROMIDE AND ALBUTEROL SULFATE 3 ML: .5; 3 SOLUTION RESPIRATORY (INHALATION) at 06:12

## 2017-12-28 RX ADMIN — POLYETHYLENE GLYCOL 3350 17 G: 17 POWDER, FOR SOLUTION ORAL at 09:12

## 2017-12-28 RX ADMIN — IOHEXOL 75 ML: 350 INJECTION, SOLUTION INTRAVENOUS at 09:12

## 2017-12-28 RX ADMIN — GUAIFENESIN 1200 MG: 600 TABLET, EXTENDED RELEASE ORAL at 09:12

## 2017-12-28 RX ADMIN — ENOXAPARIN SODIUM 40 MG: 100 INJECTION SUBCUTANEOUS at 06:12

## 2017-12-28 RX ADMIN — MOXIFLOXACIN HYDROCHLORIDE 400 MG: 400 TABLET, FILM COATED ORAL at 10:12

## 2017-12-28 RX ADMIN — MORPHINE SULFATE 4 MG: 10 INJECTION INTRAVENOUS at 02:12

## 2017-12-28 RX ADMIN — SODIUM CHLORIDE: 0.9 INJECTION, SOLUTION INTRAVENOUS at 11:12

## 2017-12-28 RX ADMIN — LACTULOSE 15 G: 20 SOLUTION ORAL at 10:12

## 2017-12-28 RX ADMIN — GUAIFENESIN 1200 MG: 600 TABLET, EXTENDED RELEASE ORAL at 10:12

## 2017-12-28 RX ADMIN — IPRATROPIUM BROMIDE AND ALBUTEROL SULFATE 3 ML: .5; 3 SOLUTION RESPIRATORY (INHALATION) at 12:12

## 2017-12-28 RX ADMIN — Medication 125 MG: at 02:12

## 2017-12-28 RX ADMIN — PROMETHAZINE HYDROCHLORIDE 25 MG: 25 INJECTION INTRAMUSCULAR; INTRAVENOUS at 12:12

## 2017-12-28 RX ADMIN — BUPROPION HYDROCHLORIDE 300 MG: 150 TABLET, FILM COATED, EXTENDED RELEASE ORAL at 10:12

## 2017-12-28 RX ADMIN — SODIUM CHLORIDE, PRESERVATIVE FREE 10 ML: 5 INJECTION INTRAVENOUS at 11:12

## 2017-12-28 RX ADMIN — MORPHINE SULFATE 4 MG: 10 INJECTION INTRAVENOUS at 03:12

## 2017-12-28 NOTE — NURSING
Etoposide infusion complete.  Patient tolerated infusion well.  IV site w/o s/s of infection, flushes well, and patient states feels good.  VSS throughout infusion and post.  Will proceed with 3rd dose of Etoposide Saturday.

## 2017-12-28 NOTE — PLAN OF CARE
Problem: Patient Care Overview  Goal: Plan of Care Review  Outcome: Ongoing (interventions implemented as appropriate)  Pt was told she had metastatic cancer,  And will need, chemo and radiation, but it is stage 4, pt having issues with abd pain, not eating, or drinking much, feels the food will not go down, on lactulose and miralax no bm today, offered enema but did not want it, may need protonix, started on decadron, encouraged inc in activity to help , also attempting to wean off high flow

## 2017-12-28 NOTE — ASSESSMENT & PLAN NOTE
Admission for chest tube placement  Acute hypoxemic respiratory failure  Malignant neoplasm of left lung stage 4  Malignant neoplasm metastatic to lymph node of axilla  Continue supplemental oxygen.  Wean down to low flow nasal cannula.  Continue prophylactic enoxaparin.  Pulmonology placed Pleur-X catheter.  Started chemotherapy.  Steroids started for brain metastases.  Next chemotherapy 3 weeks from 12/27/17.

## 2017-12-28 NOTE — PT/OT/SLP EVAL
Physical Therapy Evaluation    Patient Name:  Yen Falcon   MRN:  [id number]    Recommendations:     Discharge Recommendations:  home   Discharge Equipment Recommendations:  (ongoing assessment but no needs expected at this time)   Barriers to discharge: None    Assessment:     Yen Falcon is a 42 y.o. female admitted with a medical diagnosis of Admission for chest tube placement.  She presents with the following impairments/functional limitations:  impaired endurance, gait instability, impaired functional mobilty, impaired self care skills, impaired cardiopulmonary response to activity. Activity tolerance limited today but pt's SOB with activity and abdominal discomfort.    Rehab Prognosis:  Good; patient would benefit from acute skilled PT services to address these deficits and reach maximum level of function.      Recent Surgery: * No surgery found *      Plan:     During this hospitalization, patient to be seen 6 x/week to address the above listed problems via gait training, therapeutic activities, therapeutic exercises  · Plan of Care Expires:  01/28/18   Plan of Care Reviewed with:      Subjective     Communicated with RN Laura prior to session. Patient found sitting up in bed upon PT entry to room, agreeable to evaluation.      Chief Complaint: abdominal discomfort as she is trying to eat breakfast-RN aware  Patient comments/goals: to return home  Pain/Comfort:  · Pain Rating 1: 6/10  · Location - Orientation 1: medial  · Location 1: abdomen (reports abdominal discomfort from eating)  · Pain Addressed 1: Nurse notified  · Pain Rating Post-Intervention 1: 6/10    Patients cultural, spiritual, Denominational conflicts given the current situation: none reported to PT    Living Environment:  Pt usually lives with her 2 dependent children (11 and 16 years old) in a [facility] with no XOCHITL and tub/shower combo. Pt reports she has been staying with her mother recently, with same home set-up.  Prior to admission, patients  level of function was independent without AD and was working as an  until recently.  Patient has the following equipment: none.  DME owned (not currently used): none.  Upon discharge, patient will have assistance from her mother.    Objective:     Patient found with: oxygen (ICU monitoring, high flow O2)     General Precautions: Standard, fall, respiratory   Orthopedic Precautions:N/A   Braces: N/A     Exams:  · Cognitive Exam:  Patient is oriented to Person, Place, Time and Situation and follows 100% of all commands   · RLE ROM: WFL  · RLE Strength: WFL  · LLE ROM: WFL  · LLE Strength: WFL    Functional Mobility:  · Bed Mobility:     · Scooting: modified independence  · Transfers:     · Sit to Stand:  stand by assistance with no AD  · Toilet Transfer: stand by assistance with  no AD  using  Step Transfer  · Gait: 5 ft forward/backward with no AD and SBA. Pt becomes SOB with activity (SaO2 92-9% throughout).    AM-PAC 6 CLICK MOBILITY  Total Score:20     Therapeutic Activities and Exercises:   Pt requested return to sitting on bed to try to eat breakfast. RN present and aware of pt's stomach pain.    Patient left sitting up in bed with all lines intact and RN present.    GOALS:    Physical Therapy Goals        Problem: Physical Therapy Goal    Goal Priority Disciplines Outcome Goal Variances Interventions   Physical Therapy Goal     PT/OT, PT Ongoing (interventions implemented as appropriate)     Description:  Goals to be met by: 18     Patient will increase functional independence with mobility by performin. Sit to stand transfer with Rapides  2. Bed to chair transfer with Rapides  3. Gait  x 150 feet with Rapides                       History:     Past Medical History:   Diagnosis Date    Allergy     Angio-edema     Asthma     Malignant neoplasm of left lung stage 4 2017    Recurrent upper respiratory infection (URI)     Urticaria        Past Surgical History:    Procedure Laterality Date     SECTION      2 occurences       Clinical Decision Making:     History  Co-morbidities and personal factors that may impact the plan of care Examination  Body Structures and Functions, activity limitations and participation restrictions that may impact the plan of care Clinical Presentation   Decision Making/ Complexity Score   Co-morbidities:   [] Time since onset of injury / illness / exacerbation  [] Status of current condition  []Patient's cognitive status and safety concerns    [] Multiple Medical Problems (see med hx)  Personal Factors:   [] Patient's age  [] Prior Level of function   [] Patient's home situation (environment and family support)  [] Patient's level of motivation  [] Expected progression of patient      HISTORY:(criteria)    [x] 74655 - no personal factors/history    [] 63157 - has 1-2 personal factor/comorbidity     [] 11718 - has >3 personal factor/comorbidity     Body Regions:  [] Objective examination findings  [] Head     []  Neck  [] Trunk   [] Upper Extremity  [] Lower Extremity    Body Systems:  [] For communication ability, affect, cognition, language, and learning style: the assessment of the ability to make needs known, consciousness, orientation (person, place, and time), expected emotional /behavioral responses, and learning preferences (eg, learning barriers, education  needs)  [x] For the neuromuscular system: a general assessment of gross coordinated movement (eg, balance, gait, locomotion, transfers, and transitions) and motor function  (motor control and motor learning)  [] For the musculoskeletal system: the assessment of gross symmetry, gross range of motion, gross strength, height, and weight  [] For the integumentary system: the assessment of pliability(texture), presence of scar formation, skin color, and skin integrity  [] For cardiovascular/pulmonary system: the assessment of heart rate, respiratory rate, blood pressure, and edema      Activity limitations:    [] Patient's cognitive status and saf ety concerns          [] Status of current condition      [] Weight bearing restriction  [] Cardiopulmunary Restriction    Participation Restrictions:   [] Goals and goal agreement with the patient     [] Rehab potential (prognosis) and probable outcome      Examination of Body System: (criteria)    [x] 18855 - addressing 1-2 elements    [] 45748 - addressing a total of 3 or more elements     [] 77265 -  Addressing a total of 4 or more elements         Clinical Presentation: (criteria)  Evolving - 24943     On examination of body system using standardized tests and measures patient presents with 1-2 elements from any of the following: body structures and functions, activity limitations, and/or participation restrictions.  Leading to a clinical presentation that is considered evolving with changing characteristics                              Clinical Decision Making  (Eval Complexity):  Low- 75337     Time Tracking:     PT Received On: 12/28/17  PT Start Time: 1046     PT Stop Time: 1056  PT Total Time (min): 10 min     Billable Minutes: Evaluation 10      Jayshree Pereira, PT  12/28/2017

## 2017-12-28 NOTE — PLAN OF CARE
Pt states her food feels like it sits in her lower esophagus and upper stomach, will take the lactulose and miralax, results of mri and ct's  noted

## 2017-12-28 NOTE — PLAN OF CARE
Problem: Patient Care Overview  Goal: Plan of Care Review  Pt received on  CF nasal cannula at 20  Lpm and 40%. SPO2   92%.  Pt in no apparent respiratory distress.  Will continue to monitor.

## 2017-12-28 NOTE — PLAN OF CARE
Placed on 6lnc from high flow, sat's 95-96%, breathing easy, denies sob, tightness, comfortable

## 2017-12-28 NOTE — PLAN OF CARE
Problem: Patient Care Overview  Goal: Plan of Care Review  Outcome: Ongoing (interventions implemented as appropriate)  Received pt on HF 20L/40%; no distress noted. Will cont to monitor

## 2017-12-28 NOTE — SUBJECTIVE & OBJECTIVE
Interval History: Started CARBOPLATIN/ETOPOSIDE 12/27/17.    Oncology Treatment Plan:   CARBOPLATIN (AUC) + ETOPOSIDE    Medications:  Continuous Infusions:  Scheduled Meds:   buPROPion  300 mg Oral Daily    dexamethasone  4 mg Intravenous Q6H    enoxaparin  40 mg Subcutaneous Daily    etoposide (VEPESID) chemo infusion  100 mg/m2 Intravenous 1 time in Clinic/HOD    guaiFENesin  1,200 mg Oral BID    lactulose  15 g Oral BID    moxifloxacin  400 mg Oral Daily    polyethylene glycol  17 g Oral BID    promethazine (PHENERGAN) IVPB  25 mg Intravenous Once    sodium chloride 0.9%  3 mL Intravenous Q8H     PRN Meds:acetaminophen, albuterol-ipratropium 2.5mg-0.5mg/3mL, alteplase, aluminum-magnesium hydroxide-simethicone, diphenhydrAMINE, heparin, porcine (PF), hydrocodone-acetaminophen 7.5-325mg, LORazepam, morphine, naproxen sodium, ondansetron, sodium chloride 0.9%, sodium chloride 0.9%     Review of Systems   Constitutional: Positive for activity change, fatigue and unexpected weight change. Negative for fever.   HENT: Negative for mouth sores and nosebleeds.    Eyes: Negative for photophobia and pain.   Respiratory: Negative for cough, shortness of breath and wheezing.    Cardiovascular: Negative for chest pain and palpitations.   Gastrointestinal: Negative for abdominal pain and vomiting.   Genitourinary: Negative for flank pain and hematuria.   Musculoskeletal: Positive for arthralgias. Negative for neck pain and neck stiffness.   Skin: Negative for rash and wound.   Neurological: Negative for seizures and syncope.   Hematological: Negative for adenopathy. Does not bruise/bleed easily.   Psychiatric/Behavioral: Negative for behavioral problems and confusion. The patient is nervous/anxious.    All other systems reviewed and are negative.    Objective:     Vital Signs (Most Recent):  Temp: 97.6 °F (36.4 °C) (12/28/17 1115)  Pulse: 102 (12/28/17 0800)  Resp: (!) 25 (12/28/17 0800)  BP:  (in mri /ct ) (12/28/17  0830)  SpO2: 96 % (12/28/17 0905) Vital Signs (24h Range):  Temp:  [97.4 °F (36.3 °C)-98.2 °F (36.8 °C)] 97.6 °F (36.4 °C)  Pulse:  [] 102  Resp:  [16-36] 25  SpO2:  [82 %-100 %] 96 %  BP: ()/(66-84) 125/70     Weight: 45 kg (99 lb 3.3 oz)  Body mass index is 17.57 kg/m².  Body surface area is 1.41 meters squared.      Intake/Output Summary (Last 24 hours) at 12/28/17 1239  Last data filed at 12/27/17 2300   Gross per 24 hour   Intake          1009.77 ml   Output              580 ml   Net           429.77 ml       Physical Exam   Constitutional: She is cooperative. She does not appear ill. No distress.   HENT:   Head: Head is without laceration, without right periorbital erythema and without left periorbital erythema.   Nose: No epistaxis.   Mouth/Throat: Oropharynx is clear and moist. No oropharyngeal exudate. No tonsillar exudate.   Eyes: Conjunctivae are normal.   Neck: Neck supple. No thyroid mass and no thyromegaly present.   Cardiovascular: Normal rate and regular rhythm.  Exam reveals no friction rub.    Pulmonary/Chest: No accessory muscle usage or stridor. No tachypnea. No respiratory distress. She has decreased breath sounds in the right lower field, the left middle field and the left lower field. Chest wall is not dull to percussion. She exhibits no tenderness.   Abdominal: Soft. She exhibits no distension, no ascites and no mass. There is no hepatosplenomegaly.   Musculoskeletal: She exhibits no edema.   Lymphadenopathy:        Head (right side): No submental and no submandibular adenopathy present.        Head (left side): No submental and no submandibular adenopathy present.     She has no cervical adenopathy.     She has no axillary adenopathy.   Neurological: She is alert. She has normal strength. No cranial nerve deficit.   Skin: No bruising, no petechiae and no rash noted. She is not diaphoretic.   Psychiatric: Her behavior is normal. Thought content normal. Cognition and memory are  normal. She does not exhibit a depressed mood.   Vitals reviewed.      Significant Labs:   All pertinent labs from the last 24 hours have been reviewed.    Diagnostic Results:  I have reviewed all pertinent imaging results/findings within the past 24 hours.

## 2017-12-28 NOTE — PLAN OF CARE
Mri  Completed and pt is now having cts, pt feels she is breathing okay, but still feels she needs to take extra deeper breaths

## 2017-12-28 NOTE — ASSESSMENT & PLAN NOTE
43 yo female with aggressive Stage 4 poorly differentiated lung adenocarcinoma with rapidly progressive disease.    Brain MRI shows extensive mets with vasogenic edema. She is asymptomatic.     CT Chest/Abd/Pelvis also shows extensive disease.    Reviewed findings with pt. At bedside.    Will start Decadron 4 mg IV q 6 hrly for brain mets.  Will refer to out-pt RadOnc after completion of chemotherapy.    Proceed with Day 2 of Etoposide today.    Plan to administer 3rd dose of Etoposide for Saturday.

## 2017-12-28 NOTE — PLAN OF CARE
Back in room, settled, placed back on high flow, 20l 40%was on 9lnc while  In ct, pt still has no desire to void, asked when she first woke up and now, says no she does not need to go, offered  Her a wash up, to brush her teeth, or just to change her pad , said no, does not like to be hovered over, or to do things for , does not say much, and sometimes is short when she answers me,

## 2017-12-28 NOTE — PLAN OF CARE
Pt without co of sob, feels breathing better since treatment, will try to eat something, instructed pt to be npo past midnight except meds with sips of water, for mri and ct with contrast

## 2017-12-28 NOTE — PROGRESS NOTES
[facility]  Hematology/Oncology  Progress Note    Patient Name: Yen Falcon  Admission Date: 12/26/2017  Hospital Length of Stay: 2 days  Code Status: Full Code     Subjective:     HPI:  41 yo female recently diagnosed with Stage 4 lung carcinoma, underwent left thoracentesis for symptomatic malignant pleural effusion on 12/22/17 - now admitted with worsening dyspnea, has rapid accumulation of pleural fluid and new effusion on right.    She is in the ICU.    Biopsy report of right axillary LN/subcutaneous nodule done 12/18/17 showed poorly differentiated carcinoma compatible with primary lung adenocarcinoma.    Interval History: Started CARBOPLATIN/ETOPOSIDE 12/27/17.    Oncology Treatment Plan:   CARBOPLATIN (AUC) + ETOPOSIDE    Medications:  Continuous Infusions:  Scheduled Meds:   buPROPion  300 mg Oral Daily    dexamethasone  4 mg Intravenous Q6H    enoxaparin  40 mg Subcutaneous Daily    etoposide (VEPESID) chemo infusion  100 mg/m2 Intravenous 1 time in Clinic/HOD    guaiFENesin  1,200 mg Oral BID    lactulose  15 g Oral BID    moxifloxacin  400 mg Oral Daily    polyethylene glycol  17 g Oral BID    promethazine (PHENERGAN) IVPB  25 mg Intravenous Once    sodium chloride 0.9%  3 mL Intravenous Q8H     PRN Meds:acetaminophen, albuterol-ipratropium 2.5mg-0.5mg/3mL, alteplase, aluminum-magnesium hydroxide-simethicone, diphenhydrAMINE, heparin, porcine (PF), hydrocodone-acetaminophen 7.5-325mg, LORazepam, morphine, naproxen sodium, ondansetron, sodium chloride 0.9%, sodium chloride 0.9%     Review of Systems   Constitutional: Positive for activity change, fatigue and unexpected weight change. Negative for fever.   HENT: Negative for mouth sores and nosebleeds.    Eyes: Negative for photophobia and pain.   Respiratory: Negative for cough, shortness of breath and wheezing.    Cardiovascular: Negative for chest pain and palpitations.   Gastrointestinal: Negative for abdominal pain and  vomiting.   Genitourinary: Negative for flank pain and hematuria.   Musculoskeletal: Positive for arthralgias. Negative for neck pain and neck stiffness.   Skin: Negative for rash and wound.   Neurological: Negative for seizures and syncope.   Hematological: Negative for adenopathy. Does not bruise/bleed easily.   Psychiatric/Behavioral: Negative for behavioral problems and confusion. The patient is nervous/anxious.    All other systems reviewed and are negative.    Objective:     Vital Signs (Most Recent):  Temp: 97.6 °F (36.4 °C) (12/28/17 1115)  Pulse: 102 (12/28/17 0800)  Resp: (!) 25 (12/28/17 0800)  BP:  (in mri /ct ) (12/28/17 0830)  SpO2: 96 % (12/28/17 0905) Vital Signs (24h Range):  Temp:  [97.4 °F (36.3 °C)-98.2 °F (36.8 °C)] 97.6 °F (36.4 °C)  Pulse:  [] 102  Resp:  [16-36] 25  SpO2:  [82 %-100 %] 96 %  BP: ()/(66-84) 125/70     Weight: 45 kg (99 lb 3.3 oz)  Body mass index is 17.57 kg/m².  Body surface area is 1.41 meters squared.      Intake/Output Summary (Last 24 hours) at 12/28/17 1239  Last data filed at 12/27/17 2300   Gross per 24 hour   Intake          1009.77 ml   Output              580 ml   Net           429.77 ml       Physical Exam   Constitutional: She is cooperative. She does not appear ill. No distress.   HENT:   Head: Head is without laceration, without right periorbital erythema and without left periorbital erythema.   Nose: No epistaxis.   Mouth/Throat: Oropharynx is clear and moist. No oropharyngeal exudate. No tonsillar exudate.   Eyes: Conjunctivae are normal.   Neck: Neck supple. No thyroid mass and no thyromegaly present.   Cardiovascular: Normal rate and regular rhythm.  Exam reveals no friction rub.    Pulmonary/Chest: No accessory muscle usage or stridor. No tachypnea. No respiratory distress. She has decreased breath sounds in the right lower field, the left middle field and the left lower field. Chest wall is not dull to percussion. She exhibits no tenderness.    Abdominal: Soft. She exhibits no distension, no ascites and no mass. There is no hepatosplenomegaly.   Musculoskeletal: She exhibits no edema.   Lymphadenopathy:        Head (right side): No submental and no submandibular adenopathy present.        Head (left side): No submental and no submandibular adenopathy present.     She has no cervical adenopathy.     She has no axillary adenopathy.   Neurological: She is alert. She has normal strength. No cranial nerve deficit.   Skin: No bruising, no petechiae and no rash noted. She is not diaphoretic.   Psychiatric: Her behavior is normal. Thought content normal. Cognition and memory are normal. She does not exhibit a depressed mood.   Vitals reviewed.      Significant Labs:   All pertinent labs from the last 24 hours have been reviewed.    Diagnostic Results:  I have reviewed all pertinent imaging results/findings within the past 24 hours.    Assessment/Plan:     Adenocarcinoma of left lung, stage 4    43 yo female with aggressive Stage 4 poorly differentiated lung adenocarcinoma with rapidly progressive disease.    Brain MRI shows extensive mets with vasogenic edema. She is asymptomatic.     CT Chest/Abd/Pelvis also shows extensive disease.    Reviewed findings with pt. At bedside.    Will start Decadron 4 mg IV q 6 hrly for brain mets.  Will refer to out-pt RadOnc after completion of chemotherapy.    Proceed with Day 2 of Etoposide today.    Plan to administer 3rd dose of Etoposide for Saturday.               Vinnie Quiros MD  Hematology/Oncology  [facility]

## 2017-12-28 NOTE — PLAN OF CARE
Sat's 95% tried to lower the flow to 10l, but pt immediately, became anxious,l almost started to cry, said don't lower the 02, she can not breathe, left 15l     Chemo initiated

## 2017-12-28 NOTE — PLAN OF CARE
To mri with transport and me    0905 pt co of feeling the need for more 02, feels can not take deep breath, sat 95% 02 inc to 8lnc, states it did help,     0912  When pt was placed on mri  table to do mri, became, anxious, and felt she could not lay down, and felt sob, sat's 96% 8 lnc, states she feels tight, courser than earlier, in left and rt lobes, fine exp wheeze left mid lobe,talked with her, took slow deeper breaths, and then was able to lay on table, and start test

## 2017-12-28 NOTE — PLAN OF CARE
Gina RN  Here preparing to do chemo       1240 Dr Quiros here, did speak with pt re findings on mri and ct's, pt did not ask many questions at this time

## 2017-12-28 NOTE — PLAN OF CARE
Pt awake now, heart rate up 116 st, still feels she is unable to eat, that the food will not go down, and when she tries , her stomach starts to hurt, not passing flatus, no nausea, abd still semi firm, rounded, and slightly distended, bowel sounds hyperactive and tingling, asked about getting up to commode to see if that will help, again to move around, offered another enema to see if that helps, did not want any of those things at this time, states her stomach is okay, but  Not eating , and has not drank much all day, started on decadron ,may need  Some protonix, Dr He on page      7733 Dr He phoned, informed of not eating, co of abd pain, fullness, unable to eat, asked for prn enema, protonix, any other suggestions

## 2017-12-28 NOTE — PLAN OF CARE
Pt co of abd pain, states it really hurts, but can not describe, does say that it is not cramping, but feels like there is air that can not come out, very active ,tingling bowel sounds lower abd, but diminished upper abd, offered the maalox  but did not want that, abd is more distended than earlier, semifirm, not passing flatus

## 2017-12-28 NOTE — PROGRESS NOTES
[facility]  Pulmonology  Progress Note    Patient Name: Yen Falcon  MRN: [id number]  Admission Date: 12/26/2017  Hospital Length of Stay: 2 days  Code Status: Full Code  Attending Provider: Brian He MD  Primary Care Provider: Lauren Goodman MD   Principal Problem: Admission for chest tube placement    Subjective     Interval history  No acute events. Going for staging imaging this morning. Pt says she feels her breathing is a little better today.      Objective     Vitals:    12/28/17 1423   BP: 117/84   Pulse: 104   Resp: (!) 22   Temp:        Physical Exam   Constitutional: She is oriented to person, place, and time. She appears well-developed.   HENT: Head: Normocephalic and atraumatic.   Right pre-auricular node   Neck: Normal range of motion. Neck supple.   Cardiovascular: Normal rate and regular rhythm.    Pulmonary/Chest:       Decreased breath sounds on the left   Abdominal: Soft.   Musculoskeletal: Normal range of motion.   Neurological: She is alert and oriented to person, place, and time.   Skin: Skin is warm and dry.   Psychiatric: She has a normal mood and affect.     Labs    Recent Labs  Lab 12/23/17  0648 12/26/17  1310 12/27/17  0747   WBC 22.94* 22.70* 21.98*   HGB 10.8* 12.2 11.8*   HCT 33.8* 37.4 37.1   * 425* 404*         Recent Labs  Lab 12/21/17  2155  12/23/17  0648 12/26/17  1310 12/27/17  0747   *  < > 135* 134* 133*   K 3.6  < > 3.6 4.1 4.5   CL 98  < > 100 100 102   CO2 23  < > 27 24 22*   BUN 12  < > 13 15 15   CREATININE 0.7  < > 0.7 0.6 0.6   CALCIUM 9.0  < > 8.4* 8.4* 8.0*   PROT 6.5  --   --  6.0  --    BILITOT 0.4  --   --  0.3  --    ALKPHOS 148*  --   --  131  --    ALT 51*  --   --  132*  --    AST 47*  --   --  63*  --    < > = values in this interval not displayed.      Assessment/Recommendations                Adenocarcinoma of left lung, stage 4     · Left lung hilar mass  · Right axillary LN biopsied at [facility] on 12/18    · Axillary node showed adenocarcinoma  · Also has right rib met (palpable and painful to touch) and a pre-auricular node  · PleurX placed 12/27  · Chemotherapy started per Dr. Quiros on 12/27, well tolerated thus far  · Staging scans today       Bilateral pneumonia     · Post-obstructive pneumonia from tumor burden  · Not toxic, finishing a FQ course       Acute hypoxemic respiratory failure     · Requiring 8L NC to maintain sats and comfort  · Switched to comfort flow  · Wean supplemental O2 as tolerated       Pleural effusion, left     · Secondary to malignancy  · Last thoracentesis 12/22/17 with 1L removed   · Thoracentesis performed in the ED with 1.2L removed  · PleurX catheter placed at bedside 12/27  · Has a right-sided effusion building up, may need thoracentesis and another PleurX in future          Bryan Dennis, PGY-5  Pulmonary & Critical Care Medicine  pager [phone]

## 2017-12-28 NOTE — PLAN OF CARE
Problem: Physical Therapy Goal  Goal: Physical Therapy Goal  Goals to be met by: 18     Patient will increase functional independence with mobility by performin. Sit to stand transfer with [place]  2. Bed to chair transfer with [place]  3. Gait  x 150 feet with [place]     Outcome: Ongoing (interventions implemented as appropriate)  PT evaluation completed and pt will benefit from skilled PT services during IP stay to address pt's functional limitations. Pt with likely no needs upon d/c.

## 2017-12-28 NOTE — NURSING
Day 2 Cycle 1 Etoposide initiated to right forearm saline lock running via pump w/Guardrails.  IV sit without s/s of infection, flushed well, blood return noted.  /70 p 108 rr 25, pulse ox % on High flow 02.  Will continue to monitor.

## 2017-12-28 NOTE — PLAN OF CARE
Crying, co of severe pain, upper abd. No nausea, again offered maalox, refused, will give the simiethicone wants morphine, medicated with morphine 4mg ivp, and gave the simethicone, told her that after the chemo completed, maybe she can get to bsc, and walk in room, may help pass gas

## 2017-12-28 NOTE — PLAN OF CARE
Refuses to take the lactulose and miralax, states her stomach does not hurt and she went with the enema, told her she needs to take the meds to keep having bm's, that she is more than likely still constipated, and has more stool that can come out, also since she is still taking the morphine and pain meds, not eating and drinking normally, that this adds to the constipation, states she will take care of it at home

## 2017-12-28 NOTE — PROGRESS NOTES
[facility] Medicine  Progress Note    Patient Name: Yen Falcon  MRN: [id number]  Patient Class: IP- Inpatient   Admission Date: 12/26/2017  Length of Stay: 2 days  Attending Physician: Brian He MD  Primary Care Provider: Lauren Goodman MD        Subjective:     Principal Problem:Admission for chest tube placement    HPI:  Yen Falcon is  42 y.o.  woman (LMP: 11/26/17) with a history of recurrent upper respiratory infection, asthma diagnosed in 2013, smoking that she quit when she was diagnosed with asthma, a left malignant pleural effusion since 10/30/17 due to stage 4 left lung carcinoma.  She is a former smoker, but quit when she was diagnosed with asthma.  She lives in [place], [place].   She was found to have a left pleural effusion at [facility] on 10/30/17 that was subsequently found to be due to metastatic lung cancer after a biopsy of a right axillary lymph node metastasis on 12/18/17.  She was hospitalized at [facility] from 12/22/17 to 12/23/17 after being transferred from [facility] for hypoxia due to the effusion.  Interventional Radiology performed thoracentesis removing 1.25 liters.  She was seen by Pulmonology (Dr. Cheko Batista and Dr. Branden Manzano) who recommended prophylactic enoxaparin.  She was seen by hematologist Dr. Vinnie Quiros to establish care.  She was prescribed home oxygen with expectation for the effusion to reaccumulate.   She returned to [facility] the day after [place] with worsening shortness of breath confirmed to be due to effusion reaccumulation on chest x-ray.   Dr. Foote and Dr. Manzano drained another 1.1 liter and planned inpatient Pleur-X catheter placement, which would not be able to be done for another 2 days due to lack of staff.  She was readmitted to [facility] for this.    Hospital Course:  She  reported constipation since her last hospitalization so was given bisacodyl and polyethylene glycol.  Dr. Batista suspected she had adenocarcinoma and needed platinum based therapy.  Dr. Manzano obtained the biopsy results from [place], which showed that it was poorly differentiated adenocarcinoma.  They placed a PleurX catheter at bedside in the ICU.  After discussion with Dr. Quiros, it was decided she needed inpatient chemotherapy due to rapid progression.  She was started on carboplatin and etoposide.  Brain MRI and CT of the chest/abdomen/pelvis showed extensive metastases including in the brain, with vasogenic edema.  Dr. Quiros started steroids.    Interval History: Imaging showed extensive metastases.  Getting chemotherapy.    Review of Systems   Gastrointestinal: Negative for nausea and vomiting.   Neurological: Negative for seizures and syncope.     Objective:     Vital Signs (Most Recent):  Temp: 97.6 °F (36.4 °C) (12/28/17 1115)  Pulse: 104 (12/28/17 1423)  Resp: (!) 22 (12/28/17 1423)  BP: 117/84 (12/28/17 1423)  SpO2: 99 % (12/28/17 1423) Vital Signs (24h Range):  Temp:  [97.4 °F (36.3 °C)-98 °F (36.7 °C)] 97.6 °F (36.4 °C)  Pulse:  [] 104  Resp:  [16-36] 22  SpO2:  [82 %-99 %] 99 %  BP: ()/(66-84) 117/84     Weight: 45 kg (99 lb 3.3 oz)  Body mass index is 17.57 kg/m².    Intake/Output Summary (Last 24 hours) at 12/28/17 1502  Last data filed at 12/28/17 1412   Gross per 24 hour   Intake             1650 ml   Output              300 ml   Net             1350 ml      Physical Exam   Constitutional: She is oriented to person, place, and time. She appears well-developed. She appears cachectic.   Pulmonary/Chest: Effort normal. No respiratory distress.   Abdominal: Soft. There is no tenderness.   Neurological: She is alert and oriented to person, place, and time.   Psychiatric: She has a normal mood and affect.   Nursing note and vitals reviewed.      Significant Labs: All pertinent  labs within the past 24 hours have been reviewed.    Significant Imaging: I have reviewed all pertinent imaging results/findings within the past 24 hours.   MRI Brain W WO Contrast 12/28/17: The cortical sulcal pattern is normal for age. No midline shift or concerning mass effect. No MR evidence of acute stroke present.  There multiple bilateral primarily rim-enhancing supratentorial cerebral lesions consistent with metastatic disease. Largest is noted at the posterior aspect of the right basal ganglia measuring 6 mm with surrounding edema.  There is a larger 1.6 cm enhancing lesion within the left cerebellar hemisphere with surrounding edema. No significant mass effect on the fourth ventricle.  No acute hemorrhage.  Orbits, mastoid air cells and paranasal sinuses are unremarkable. major. intracranial flow voids noted.  Enlarged right submandibular pathologic lymph nodes present, incompletely imaged.  Impression: Findings compatible with metastatic disease, largest lesion within the left cerebellar hemisphere. Largest cerebral lesion present within the posterior aspect of the right basal ganglia.  Right submandibular adenopathy.  CT Chest Abdomen Pelvis W WO Contrast 12/28/17:   Thoracic aorta and great vessels are unremarkable. Heart demonstrates no chamber enlargement. Small pericardial effusion present.  There are multiple pathologically enlarged centrally necrotic lymph nodes involving the left supraclavicular, right axillary, right/left paratracheal region, prevascular space, AP window, precarinal, subcarinal and left greater than right hilar locations.  There is soft tissue fullness about the left hilar region difficult to distinguish from possible lung mass or consolidation. There is mild narrowing of the left main pulmonary artery and distal left main bronchus. There is encasement of the proximal left upper and lower lobe bronchi and vascular structures. Pulmonary necrotic changes noted within the left upper  lobe.  There is patchy air space changes noted within the right upper lobe, right middle lobe, right lower lobe and peripheral left lower lobe with bilateral dependent lower lobe atelectatic opacity.  Small left-sided effusion presents with chest tube in place. There is a small to moderate where in right-sided pleural effusion.  Within the abdomen, the liver demonstrates multiple hypoattenuating lesions, largest measuring 2.9 cm on axial images 68. Gallbladder is unremarkable. No biliary dilatation. Hepatic veins, IVC and portal veins are patent.  Right kidney demonstrates multiple round areas of decreased enhancement, largest 3 cm within the medial mid to upper pole. Left kidney demonstrates a 1 cm exophytic rim-enhancing lesion within the posterior aspect of the upper pole.  Spleen is unremarkable. Pancreas demonstrates a 1.7 cm hypodense body mass.  No significant ascites within the abdomen. Small amount of pelvic free fluid noted. There are multiple small para-aortic lymph nodes. Small metastatic foci are present within the left paracolic gutter region, axial image 80. Larger lesion in the left upper quadrant axial image 68.  Multiple subcutaneous and intramuscular metastatic foci are present, largest within the gluteal muscles. No large osseous metastatic lesion. Minimally displaced right anterior lateral sixth rib fracture noted.  Impression:  Extensive abnormality within the chest, abdomen and pelvis as above with diffuse metastatic disease. Primary tumor is of uncertain origin as liver, renal and pancreatic lesions are noted. Multiple mediastinal and hilar lymph nodes presents with possible left hilar pulmonary mass versus conglomerate mahesh mass, difficult to distinguish from consolidated lung.  Bilateral pulmonary opacity present most prevalent in the left upper lobe with early lung necrosis. Small to moderate layering right pleural effusion with left basilar pleural effusion containing small chest  tube.  Multiple metastatic lesions within the abdomen and subcutaneous soft tissues of the chest, abdomen and pelvis.    Assessment/Plan:      Constipation    Giving polyethylene glycol and lactulose.          Bilateral pneumonia    Continue respiratory fluoroquinolone started last admission.          Anxiety    Continue bupropion and lorazepam PRN.          Pleural effusion on left    Admission for chest tube placement  Acute hypoxemic respiratory failure  Malignant neoplasm of left lung stage 4  Malignant neoplasm metastatic to lymph node of axilla  Continue supplemental oxygen.  Wean down to low flow nasal cannula.  Continue prophylactic enoxaparin.  Pulmonology placed Pleur-X catheter.  Started chemotherapy.  Steroids started for brain metastases.  Next chemotherapy 3 weeks from 12/27/17.          VTE Risk Mitigation         Ordered     heparin, porcine (PF) 100 unit/mL injection flush 500 Units  As needed (PRN)     Route:  Intravenous        12/27/17 1210     enoxaparin injection 40 mg  Daily     Route:  Subcutaneous        12/26/17 2223     Medium Risk of VTE  Once      12/26/17 2223          Brian He MD  Department of Hospital Medicine   [facility]

## 2017-12-28 NOTE — SUBJECTIVE & OBJECTIVE
Interval History: Imaging showed extensive metastases.  Getting chemotherapy.    Review of Systems   Gastrointestinal: Negative for nausea and vomiting.   Neurological: Negative for seizures and syncope.     Objective:     Vital Signs (Most Recent):  Temp: 97.6 °F (36.4 °C) (12/28/17 1115)  Pulse: 104 (12/28/17 1423)  Resp: (!) 22 (12/28/17 1423)  BP: 117/84 (12/28/17 1423)  SpO2: 99 % (12/28/17 1423) Vital Signs (24h Range):  Temp:  [97.4 °F (36.3 °C)-98 °F (36.7 °C)] 97.6 °F (36.4 °C)  Pulse:  [] 104  Resp:  [16-36] 22  SpO2:  [82 %-99 %] 99 %  BP: ()/(66-84) 117/84     Weight: 45 kg (99 lb 3.3 oz)  Body mass index is 17.57 kg/m².    Intake/Output Summary (Last 24 hours) at 12/28/17 1502  Last data filed at 12/28/17 1412   Gross per 24 hour   Intake             1650 ml   Output              300 ml   Net             1350 ml      Physical Exam   Constitutional: She is oriented to person, place, and time. She appears well-developed. She appears cachectic.   Pulmonary/Chest: Effort normal. No respiratory distress.   Abdominal: Soft. There is no tenderness.   Neurological: She is alert and oriented to person, place, and time.   Psychiatric: She has a normal mood and affect.   Nursing note and vitals reviewed.      Significant Labs: All pertinent labs within the past 24 hours have been reviewed.    Significant Imaging: I have reviewed all pertinent imaging results/findings within the past 24 hours.   MRI Brain W WO Contrast 12/28/17: The cortical sulcal pattern is normal for age. No midline shift or concerning mass effect. No MR evidence of acute stroke present.  There multiple bilateral primarily rim-enhancing supratentorial cerebral lesions consistent with metastatic disease. Largest is noted at the posterior aspect of the right basal ganglia measuring 6 mm with surrounding edema.  There is a larger 1.6 cm enhancing lesion within the left cerebellar hemisphere with surrounding edema. No significant mass  effect on the fourth ventricle.  No acute hemorrhage.  Orbits, mastoid air cells and paranasal sinuses are unremarkable. major. intracranial flow voids noted.  Enlarged right submandibular pathologic lymph nodes present, incompletely imaged.  Impression: Findings compatible with metastatic disease, largest lesion within the left cerebellar hemisphere. Largest cerebral lesion present within the posterior aspect of the right basal ganglia.  Right submandibular adenopathy.  CT Chest Abdomen Pelvis W WO Contrast 12/28/17:   Thoracic aorta and great vessels are unremarkable. Heart demonstrates no chamber enlargement. Small pericardial effusion present.  There are multiple pathologically enlarged centrally necrotic lymph nodes involving the left supraclavicular, right axillary, right/left paratracheal region, prevascular space, AP window, precarinal, subcarinal and left greater than right hilar locations.  There is soft tissue fullness about the left hilar region difficult to distinguish from possible lung mass or consolidation. There is mild narrowing of the left main pulmonary artery and distal left main bronchus. There is encasement of the proximal left upper and lower lobe bronchi and vascular structures. Pulmonary necrotic changes noted within the left upper lobe.  There is patchy air space changes noted within the right upper lobe, right middle lobe, right lower lobe and peripheral left lower lobe with bilateral dependent lower lobe atelectatic opacity.  Small left-sided effusion presents with chest tube in place. There is a small to moderate where in right-sided pleural effusion.  Within the abdomen, the liver demonstrates multiple hypoattenuating lesions, largest measuring 2.9 cm on axial images 68. Gallbladder is unremarkable. No biliary dilatation. Hepatic veins, IVC and portal veins are patent.  Right kidney demonstrates multiple round areas of decreased enhancement, largest 3 cm within the medial mid to upper  pole. Left kidney demonstrates a 1 cm exophytic rim-enhancing lesion within the posterior aspect of the upper pole.  Spleen is unremarkable. Pancreas demonstrates a 1.7 cm hypodense body mass.  No significant ascites within the abdomen. Small amount of pelvic free fluid noted. There are multiple small para-aortic lymph nodes. Small metastatic foci are present within the left paracolic gutter region, axial image 80. Larger lesion in the left upper quadrant axial image 68.  Multiple subcutaneous and intramuscular metastatic foci are present, largest within the gluteal muscles. No large osseous metastatic lesion. Minimally displaced right anterior lateral sixth rib fracture noted.  Impression:  Extensive abnormality within the chest, abdomen and pelvis as above with diffuse metastatic disease. Primary tumor is of uncertain origin as liver, renal and pancreatic lesions are noted. Multiple mediastinal and hilar lymph nodes presents with possible left hilar pulmonary mass versus conglomerate mahesh mass, difficult to distinguish from consolidated lung.  Bilateral pulmonary opacity present most prevalent in the left upper lobe with early lung necrosis. Small to moderate layering right pleural effusion with left basilar pleural effusion containing small chest tube.  Multiple metastatic lesions within the abdomen and subcutaneous soft tissues of the chest, abdomen and pelvis.

## 2017-12-29 LAB
ANION GAP SERPL CALC-SCNC: 8 MMOL/L
BACTERIA SPEC ANAEROBE CULT: NORMAL
BUN SERPL-MCNC: 12 MG/DL
CALCIUM SERPL-MCNC: 8.1 MG/DL
CHLORIDE SERPL-SCNC: 101 MMOL/L
CO2 SERPL-SCNC: 26 MMOL/L
CREAT SERPL-MCNC: 0.6 MG/DL
EST. GFR  (AFRICAN AMERICAN): >60 ML/MIN/1.73 M^2
EST. GFR  (NON AFRICAN AMERICAN): >60 ML/MIN/1.73 M^2
GLUCOSE SERPL-MCNC: 140 MG/DL
LDH SERPL L TO P-CCNC: 364 U/L
MAGNESIUM SERPL-MCNC: 1.9 MG/DL
PHOSPHATE SERPL-MCNC: 3.1 MG/DL
POTASSIUM SERPL-SCNC: 4.4 MMOL/L
SODIUM SERPL-SCNC: 135 MMOL/L
URATE SERPL-MCNC: 4.3 MG/DL

## 2017-12-29 PROCEDURE — 63600175 PHARM REV CODE 636 W HCPCS: Performed by: INTERNAL MEDICINE

## 2017-12-29 PROCEDURE — 36415 COLL VENOUS BLD VENIPUNCTURE: CPT

## 2017-12-29 PROCEDURE — 94640 AIRWAY INHALATION TREATMENT: CPT

## 2017-12-29 PROCEDURE — 63600175 PHARM REV CODE 636 W HCPCS: Performed by: HOSPITALIST

## 2017-12-29 PROCEDURE — 99233 SBSQ HOSP IP/OBS HIGH 50: CPT | Mod: ,,, | Performed by: INTERNAL MEDICINE

## 2017-12-29 PROCEDURE — 25000242 PHARM REV CODE 250 ALT 637 W/ HCPCS: Performed by: HOSPITALIST

## 2017-12-29 PROCEDURE — 80048 BASIC METABOLIC PNL TOTAL CA: CPT

## 2017-12-29 PROCEDURE — 25000003 PHARM REV CODE 250: Performed by: HOSPITALIST

## 2017-12-29 PROCEDURE — 83735 ASSAY OF MAGNESIUM: CPT

## 2017-12-29 PROCEDURE — 83615 LACTATE (LD) (LDH) ENZYME: CPT

## 2017-12-29 PROCEDURE — 84550 ASSAY OF BLOOD/URIC ACID: CPT

## 2017-12-29 PROCEDURE — 97110 THERAPEUTIC EXERCISES: CPT

## 2017-12-29 PROCEDURE — 27100171 HC OXYGEN HIGH FLOW UP TO 24 HOURS

## 2017-12-29 PROCEDURE — 84100 ASSAY OF PHOSPHORUS: CPT

## 2017-12-29 PROCEDURE — 25000003 PHARM REV CODE 250: Performed by: EMERGENCY MEDICINE

## 2017-12-29 PROCEDURE — A4216 STERILE WATER/SALINE, 10 ML: HCPCS | Performed by: EMERGENCY MEDICINE

## 2017-12-29 PROCEDURE — 11000001 HC ACUTE MED/SURG PRIVATE ROOM

## 2017-12-29 PROCEDURE — 94761 N-INVAS EAR/PLS OXIMETRY MLT: CPT

## 2017-12-29 RX ORDER — METOCLOPRAMIDE 10 MG/1
10 TABLET ORAL
Status: DISCONTINUED | OUTPATIENT
Start: 2017-12-29 | End: 2018-01-06

## 2017-12-29 RX ORDER — DEXAMETHASONE 4 MG/1
4 TABLET ORAL 4 TIMES DAILY
Status: DISCONTINUED | OUTPATIENT
Start: 2017-12-29 | End: 2018-01-02

## 2017-12-29 RX ORDER — IPRATROPIUM BROMIDE AND ALBUTEROL SULFATE 2.5; .5 MG/3ML; MG/3ML
3 SOLUTION RESPIRATORY (INHALATION) EVERY 4 HOURS
Status: DISCONTINUED | OUTPATIENT
Start: 2017-12-29 | End: 2017-12-30

## 2017-12-29 RX ORDER — NYSTATIN 100000 [USP'U]/ML
500000 SUSPENSION ORAL 4 TIMES DAILY
Status: DISCONTINUED | OUTPATIENT
Start: 2017-12-29 | End: 2018-01-03

## 2017-12-29 RX ORDER — HYDROMORPHONE HYDROCHLORIDE 2 MG/ML
1 INJECTION, SOLUTION INTRAMUSCULAR; INTRAVENOUS; SUBCUTANEOUS EVERY 4 HOURS PRN
Status: DISCONTINUED | OUTPATIENT
Start: 2017-12-29 | End: 2018-01-05

## 2017-12-29 RX ADMIN — IPRATROPIUM BROMIDE AND ALBUTEROL SULFATE 3 ML: .5; 3 SOLUTION RESPIRATORY (INHALATION) at 03:12

## 2017-12-29 RX ADMIN — IPRATROPIUM BROMIDE AND ALBUTEROL SULFATE 3 ML: .5; 3 SOLUTION RESPIRATORY (INHALATION) at 12:12

## 2017-12-29 RX ADMIN — POLYETHYLENE GLYCOL 3350 17 G: 17 POWDER, FOR SOLUTION ORAL at 08:12

## 2017-12-29 RX ADMIN — NYSTATIN 500000 UNITS: 500000 SUSPENSION ORAL at 06:12

## 2017-12-29 RX ADMIN — PANTOPRAZOLE SODIUM 40 MG: 40 TABLET, DELAYED RELEASE ORAL at 08:12

## 2017-12-29 RX ADMIN — MORPHINE SULFATE 4 MG: 10 INJECTION INTRAVENOUS at 12:12

## 2017-12-29 RX ADMIN — METOCLOPRAMIDE 10 MG: 10 TABLET ORAL at 04:12

## 2017-12-29 RX ADMIN — SODIUM CHLORIDE, PRESERVATIVE FREE 3 ML: 5 INJECTION INTRAVENOUS at 10:12

## 2017-12-29 RX ADMIN — IPRATROPIUM BROMIDE AND ALBUTEROL SULFATE 3 ML: .5; 3 SOLUTION RESPIRATORY (INHALATION) at 11:12

## 2017-12-29 RX ADMIN — GUAIFENESIN 1200 MG: 600 TABLET, EXTENDED RELEASE ORAL at 09:12

## 2017-12-29 RX ADMIN — ENOXAPARIN SODIUM 40 MG: 100 INJECTION SUBCUTANEOUS at 06:12

## 2017-12-29 RX ADMIN — HYDROCODONE BITARTRATE AND ACETAMINOPHEN 1 TABLET: 7.5; 325 TABLET ORAL at 06:12

## 2017-12-29 RX ADMIN — IPRATROPIUM BROMIDE AND ALBUTEROL SULFATE 3 ML: .5; 3 SOLUTION RESPIRATORY (INHALATION) at 08:12

## 2017-12-29 RX ADMIN — BUPROPION HYDROCHLORIDE 300 MG: 150 TABLET, FILM COATED, EXTENDED RELEASE ORAL at 08:12

## 2017-12-29 RX ADMIN — SODIUM CHLORIDE, PRESERVATIVE FREE 3 ML: 5 INJECTION INTRAVENOUS at 02:12

## 2017-12-29 RX ADMIN — POLYETHYLENE GLYCOL 3350 17 G: 17 POWDER, FOR SOLUTION ORAL at 09:12

## 2017-12-29 RX ADMIN — DEXAMETHASONE 4 MG: 4 TABLET ORAL at 06:12

## 2017-12-29 RX ADMIN — DEXAMETHASONE SODIUM PHOSPHATE 4 MG: 4 INJECTION, SOLUTION INTRAMUSCULAR; INTRAVENOUS at 12:12

## 2017-12-29 RX ADMIN — DEXAMETHASONE SODIUM PHOSPHATE 4 MG: 4 INJECTION, SOLUTION INTRAMUSCULAR; INTRAVENOUS at 05:12

## 2017-12-29 RX ADMIN — NYSTATIN 500000 UNITS: 500000 SUSPENSION ORAL at 12:12

## 2017-12-29 RX ADMIN — LACTULOSE 15 G: 20 SOLUTION ORAL at 09:12

## 2017-12-29 RX ADMIN — LACTULOSE 15 G: 20 SOLUTION ORAL at 08:12

## 2017-12-29 RX ADMIN — MOXIFLOXACIN HYDROCHLORIDE 400 MG: 400 TABLET, FILM COATED ORAL at 08:12

## 2017-12-29 RX ADMIN — GUAIFENESIN 1200 MG: 600 TABLET, EXTENDED RELEASE ORAL at 08:12

## 2017-12-29 RX ADMIN — IPRATROPIUM BROMIDE AND ALBUTEROL SULFATE 3 ML: .5; 3 SOLUTION RESPIRATORY (INHALATION) at 07:12

## 2017-12-29 RX ADMIN — METOCLOPRAMIDE 10 MG: 10 TABLET ORAL at 12:12

## 2017-12-29 NOTE — SUBJECTIVE & OBJECTIVE
Interval History: Transferred to Floor    Oncology Treatment Plan:   CARBOPLATIN (AUC) + ETOPOSIDE    Medications:  Continuous Infusions:  Scheduled Meds:   albuterol-ipratropium 2.5mg-0.5mg/3mL  3 mL Nebulization Q4H    buPROPion  300 mg Oral Daily    dexamethasone  4 mg Intravenous Q6H    enoxaparin  40 mg Subcutaneous Daily    guaiFENesin  1,200 mg Oral BID    lactulose  15 g Oral BID    metoclopramide HCl  10 mg Oral TID AC    moxifloxacin  400 mg Oral Daily    nystatin  500,000 Units Oral QID    pantoprazole  40 mg Oral Daily    polyethylene glycol  17 g Oral BID    sodium chloride 0.9%  3 mL Intravenous Q8H     PRN Meds:acetaminophen, albuterol-ipratropium 2.5mg-0.5mg/3mL, alteplase, aluminum-magnesium hydroxide-simethicone, diphenhydrAMINE, heparin, porcine (PF), hydrocodone-acetaminophen 7.5-325mg, HYDROmorphone, LORazepam, naproxen sodium, ondansetron, sodium chloride 0.9%, sodium chloride 0.9%     Review of Systems   Constitutional: Positive for activity change, fatigue and unexpected weight change. Negative for fever.   HENT: Negative for mouth sores and nosebleeds.    Eyes: Negative for photophobia and pain.   Respiratory: Negative for cough, shortness of breath and wheezing.    Cardiovascular: Negative for chest pain and palpitations.   Gastrointestinal: Negative for abdominal pain and vomiting.   Genitourinary: Negative for flank pain and hematuria.   Musculoskeletal: Positive for arthralgias. Negative for neck pain and neck stiffness.   Skin: Negative for rash and wound.   Neurological: Negative for seizures and syncope.   Hematological: Negative for adenopathy. Does not bruise/bleed easily.   Psychiatric/Behavioral: Negative for behavioral problems and confusion. The patient is nervous/anxious.    All other systems reviewed and are negative.    Objective:     Vital Signs (Most Recent):  Temp: 96.6 °F (35.9 °C) (12/29/17 1048)  Pulse: (!) 117 (12/29/17 1133)  Resp: 18 (12/29/17 1133)  BP:  (!) 147/86 (12/29/17 1048)  SpO2: 96 % (12/29/17 1133) Vital Signs (24h Range):  Temp:  [96.6 °F (35.9 °C)-98.6 °F (37 °C)] 96.6 °F (35.9 °C)  Pulse:  [101-125] 117  Resp:  [15-33] 18  SpO2:  [90 %-99 %] 96 %  BP: (104-158)/(71-96) 147/86     Weight: 45 kg (99 lb 3.3 oz)  Body mass index is 17.57 kg/m².  Body surface area is 1.41 meters squared.      Intake/Output Summary (Last 24 hours) at 12/29/17 1340  Last data filed at 12/29/17 0800   Gross per 24 hour   Intake             1200 ml   Output              450 ml   Net              750 ml       Physical Exam   Constitutional: She is cooperative. She does not appear ill. No distress.   HENT:   Head: Head is without laceration, without right periorbital erythema and without left periorbital erythema.   Nose: No epistaxis.   Mouth/Throat: Oropharynx is clear and moist. No oropharyngeal exudate. No tonsillar exudate.   Eyes: Conjunctivae are normal.   Neck: Neck supple. No thyroid mass and no thyromegaly present.   Cardiovascular: Normal rate and regular rhythm.  Exam reveals no friction rub.    Pulmonary/Chest: No accessory muscle usage or stridor. No tachypnea. No respiratory distress. She has decreased breath sounds in the right lower field, the left middle field and the left lower field.         Chest wall is not dull to percussion. She exhibits no tenderness.   Abdominal: Soft. She exhibits no distension, no ascites and no mass. There is no hepatosplenomegaly.   Musculoskeletal: She exhibits no edema.   Lymphadenopathy:        Head (right side): No submental and no submandibular adenopathy present.        Head (left side): No submental and no submandibular adenopathy present.     She has no cervical adenopathy.     She has no axillary adenopathy.   Neurological: She is alert. She has normal strength. No cranial nerve deficit.   Skin: No bruising, no petechiae and no rash noted. She is not diaphoretic.   Psychiatric: Her behavior is normal. Thought content normal.  Cognition and memory are normal. She does not exhibit a depressed mood.   Vitals reviewed.      Significant Labs:   All pertinent labs from the last 24 hours have been reviewed.    Diagnostic Results:  I have reviewed all pertinent imaging results/findings within the past 24 hours.

## 2017-12-29 NOTE — PLAN OF CARE
Transferred to 516 by Suyapa CHILDRESS , on nasal cannula 6 l , belongings with her, computer,  Purse phone, clothes

## 2017-12-29 NOTE — ASSESSMENT & PLAN NOTE
43 yo female with aggressive Stage 4 poorly differentiated lung adenocarcinoma with rapidly progressive disease.    Will change Dexamethasone from IV to po - 4 mg QID - for asymptomatic brain mets with edema    Refer to out-pt RadOnc after completion of chemotherapy.    3rd dose of Etoposide chemotherapy scheduled for tomorrow.    After that she can be discharged home, if her breathing improves and if her home oxygen can be arranged - as currently she is requiring high flow oxygen

## 2017-12-29 NOTE — PT/OT/SLP PROGRESS
Physical Therapy Treatment    Patient Name:  Yne Falcon   MRN:  [id number]    Recommendations:     Discharge Recommendations:  home   Discharge Equipment Recommendations:     Barriers to discharge: None    Assessment:     Yen Falcon is a 42 y.o. female admitted with a medical diagnosis of Admission for chest tube placement.  She presents with the following impairments/functional limitations:  impaired endurance, gait instability, impaired functional mobilty, impaired cardiopulmonary response to activity. Pt ambulated in room ~15-20 ft with high flow O2 donned. SpO2 level   from 95 in sitting to a low of 78 while ambulating. Pt very willing with all therapeutic activities. Required numerous seated rest breaks between all therapeutic activities and exercises secondary to decreasing SpO2 level. Will continue PT and increase ambulation distance covered when able.    Rehab Prognosis:  fair; patient would benefit from acute skilled PT services to address these deficits and reach maximum level of function.      Recent Surgery: * No surgery found *      Plan:     During this hospitalization, patient to be seen 6 x/week to address the above listed problems via gait training, therapeutic activities, therapeutic exercises  · Plan of Care Expires:  01/28/18   Plan of Care Reviewed with: patient    Subjective     Communicated with nurse prior to session.  Patient found sitting upright in bed with high flow O2 and family members present. upon PT entry to room, agreeable to treatment.      Chief Complaint: No complaints expressed to PTA at this time  Patient comments: To be able to increase walking distance without becoming short of breath  Pain/Comfort:  · Pain Rating 1:  (none expressed to PTA)    Patients cultural, spiritual, Bahai conflicts given the current situation: none reported to PT    Objective:     Patient found with: oxygen (High flow O2)     General Precautions: Standard, fall, respiratory   Orthopedic  Precautions:N/A   Braces: N/A     Functional Mobility:  · Transfers:     · Sit to Stand:  stand by assistance with no AD  · Gait: ~15-20 ft NAD high flow O2 donned  · Balance: static/dynamic standing= G      AM-PAC 6 CLICK MOBILITY  Turning over in bed (including adjusting bedclothes, sheets and blankets)?: 4  Sitting down on and standing up from a chair with arms (e.g., wheelchair, bedside commode, etc.): 3  Moving from lying on back to sitting on the side of the bed?: 4  Moving to and from a bed to a chair (including a wheelchair)?: 3  Need to walk in hospital room?: 3  Climbing 3-5 steps with a railing?: 3  Total Score: 20       Therapeutic Activities and Exercises:   Pt performed ambulation training in room only ~15 - 20 ft NAD and SBA. Seated therapeutic exercises 1 x 10 reps LAQ's, hip add/abd. Standing therapeutic exercise 1 x 10 reps heel raises resting bilaterla hands on PTA's hands. Requires seated rest breaks between each exercise ~1-11/2 minutes in length. SpO2 levels taken throughout treatment, fluctuated 78-95 with low of 78 registering while performing ambulation training and high of 95 sitting at EOB.    Patient left sitting upright in middle of bed with all lines intact, call button in reach, nurse notified and  family members present..    GOALS:    Physical Therapy Goals        Problem: Physical Therapy Goal    Goal Priority Disciplines Outcome Goal Variances Interventions   Physical Therapy Goal     PT/OT, PT Ongoing (interventions implemented as appropriate)     Description:  Goals to be met by: 18     Patient will increase functional independence with mobility by performin. Sit to stand transfer with [place]  2. Bed to chair transfer with [place]  3. Gait  x 150 feet with [place]                       Time Tracking:     PT Received On: 17  PT Start Time: 1552     PT Stop Time: 1609  PT Total Time (min): 17 min     Billable Minutes: Gait Training   7 non-billable  and Therapeutic Exercise  10    Treatment Type: Treatment  PT/PTA: PTA     PTA Visit Number: 1     Isabell Burleson, PTA  12/29/2017

## 2017-12-29 NOTE — PROGRESS NOTES
[facility] Hospital Medicine  Progress Note    Patient Name: Yen Falcon  MRN: [id number]  Patient Class: IP- Inpatient   Admission Date: 12/26/2017  Length of Stay: 3 days  Attending Physician: Brian He MD  Primary Care Provider: Lauren Goodman MD        Subjective:     Principal Problem:Admission for chest tube placement    HPI:  Yen Falcon is  42 y.o.  woman (LMP: 11/26/17) with a history of recurrent upper respiratory infection, asthma diagnosed in 2013, smoking that she quit when she was diagnosed with asthma, a left malignant pleural effusion since 10/30/17 due to stage 4 left lung carcinoma.  She is a former smoker, but quit when she was diagnosed with asthma.  She lives in [place], [place].   She was found to have a left pleural effusion at [facility] on 10/30/17 that was subsequently found to be due to metastatic lung cancer after a biopsy of a right axillary lymph node metastasis on 12/18/17.  She was hospitalized at [facility] from 12/22/17 to 12/23/17 after being transferred from [facility] for hypoxia due to the effusion.  Interventional Radiology performed thoracentesis removing 1.25 liters.  She was seen by Pulmonology (Dr. Cheko Batista and Dr. Branden Manzano) who recommended prophylactic enoxaparin.  She was seen by hematologist Dr. Vinnie Quiros to establish care.  She was prescribed home oxygen with expectation for the effusion to reaccumulate.   She returned to [facility] the day after [place] with worsening shortness of breath confirmed to be due to effusion reaccumulation on chest x-ray.   Dr. Foote and Dr. Manzano drained another 1.1 liter and planned inpatient Pleur-X catheter placement, which would not be able to be done for another 2 days due to lack of staff.  She was readmitted to [facility] for this.    Hospital Course:  She  reported constipation since her last hospitalization so was given bisacodyl and polyethylene glycol.  Dr. Batista suspected she had adenocarcinoma and needed platinum based therapy.  Dr. Manzano obtained the biopsy results from [place], which showed that it was poorly differentiated adenocarcinoma.  They placed a PleurX catheter at bedside in the ICU.  After discussion with Dr. Quiros, it was decided she needed inpatient chemotherapy due to rapid progression.  She was started on carboplatin and etoposide.  Brain MRI and CT of the chest/abdomen/pelvis showed extensive metastases including in the brain, with vasogenic edema.  Dr. Quiros started steroids.  She required high flow nasal cannula.  She was transferred out of the ICU on 12/29/17.    Interval History: Has some whitish plaques on tongue.  Nebulizer treatment seemed to help breathing.  Bowels still do not seem to be moving.  Feels it mostly in the upper abdomen.    Review of Systems   Gastrointestinal: Positive for abdominal distention and constipation. Negative for vomiting.   Neurological: Negative for seizures and syncope.     Objective:     Vital Signs (Most Recent):  Temp: 97.5 °F (36.4 °C) (12/29/17 0834)  Pulse: (!) 111 (12/29/17 0726)  Resp: 18 (12/29/17 0726)  BP: 134/77 (12/29/17 0400)  SpO2: 96 % (12/29/17 0726) Vital Signs (24h Range):  Temp:  [96.8 °F (36 °C)-98.6 °F (37 °C)] 97.5 °F (36.4 °C)  Pulse:  [100-125] 111  Resp:  [15-35] 18  SpO2:  [93 %-99 %] 96 %  BP: (104-158)/(69-96) 134/77     Weight: 45 kg (99 lb 3.3 oz)  Body mass index is 17.57 kg/m².    Intake/Output Summary (Last 24 hours) at 12/29/17 1021  Last data filed at 12/28/17 2300   Gross per 24 hour   Intake             1220 ml   Output              800 ml   Net              420 ml      Physical Exam   Constitutional: She is oriented to person, place, and time. She appears well-developed. She appears cachectic. Nasal cannula in place.   Pulmonary/Chest: Effort normal. No  respiratory distress. She has wheezes.   Abdominal: Soft. Bowel sounds are decreased. There is no tenderness.   Neurological: She is alert and oriented to person, place, and time.   Psychiatric: She has a normal mood and affect.   Nursing note and vitals reviewed.      Significant Labs: All pertinent labs within the past 24 hours have been reviewed.    Significant Imaging: I have reviewed all pertinent imaging results/findings within the past 24 hours.   X-Ray Chest 1 View 12/29/17: Portable AP view of the chest was obtained.  Comparison -- 12/28.  The heart size is normal.  Mediastinal structures midline.  Lungs are expanded .  Patchy consolidation in the right lung toward the base is about the same.  Left lung continues to show widespread air space consolidation with air bronchograms.  Lung consolidation could represent edema, pneumonia, aspiration, etc.  Mild bilateral pleural effusions are unchanged, left more than right.  A pleural catheter remains on the left near the diaphragm.  No new development is noted.    Assessment/Plan:      Constipation    Giving polyethylene glycol and lactulose.  Try metoclopramide.          Bilateral pneumonia    Continue respiratory fluoroquinolone started last admission.          Anxiety    Continue bupropion and lorazepam PRN.          Pleural effusion on left    Admission for chest tube placement  Acute hypoxemic respiratory failure  Malignant neoplasm of left lung stage 4  Malignant neoplasm metastatic to lymph node of axilla  Asthma  Continue supplemental oxygen.  Wean down to low flow nasal cannula.  Give scheduled nebulizer treatments due to asthma history and wheeze on exam.  Continue prophylactic enoxaparin.  Pulmonology placed Pleur-X catheter.  Started chemotherapy.  Steroids started for brain metastases, and pantoprazole started due to abdominal complaints.  Give vanilla Boost supplements.  Next chemotherapy 3 weeks from 12/27/17.          VTE Risk Mitigation          Ordered     heparin, porcine (PF) 100 unit/mL injection flush 500 Units  As needed (PRN)     Route:  Intravenous        12/27/17 1210     enoxaparin injection 40 mg  Daily     Route:  Subcutaneous        12/26/17 2223     Medium Risk of VTE  Once      12/26/17 2223          Critical care time spent on the evaluation and treatment of severe organ dysfunction, review of pertinent labs and imaging studies, discussions with consulting providers and discussions with patient/family: 45 minutes.    Brian He MD  Department of Hospital Medicine   [facility]

## 2017-12-29 NOTE — SUBJECTIVE & OBJECTIVE
Interval History: Has some whitish plaques on tongue.  Nebulizer treatment seemed to help breathing.  Bowels still do not seem to be moving.  Feels it mostly in the upper abdomen.    Review of Systems   Gastrointestinal: Positive for abdominal distention and constipation. Negative for vomiting.   Neurological: Negative for seizures and syncope.     Objective:     Vital Signs (Most Recent):  Temp: 97.5 °F (36.4 °C) (12/29/17 0834)  Pulse: (!) 111 (12/29/17 0726)  Resp: 18 (12/29/17 0726)  BP: 134/77 (12/29/17 0400)  SpO2: 96 % (12/29/17 0726) Vital Signs (24h Range):  Temp:  [96.8 °F (36 °C)-98.6 °F (37 °C)] 97.5 °F (36.4 °C)  Pulse:  [100-125] 111  Resp:  [15-35] 18  SpO2:  [93 %-99 %] 96 %  BP: (104-158)/(69-96) 134/77     Weight: 45 kg (99 lb 3.3 oz)  Body mass index is 17.57 kg/m².    Intake/Output Summary (Last 24 hours) at 12/29/17 1021  Last data filed at 12/28/17 2300   Gross per 24 hour   Intake             1220 ml   Output              800 ml   Net              420 ml      Physical Exam   Constitutional: She is oriented to person, place, and time. She appears well-developed. She appears cachectic. Nasal cannula in place.   Pulmonary/Chest: Effort normal. No respiratory distress. She has wheezes.   Abdominal: Soft. Bowel sounds are decreased. There is no tenderness.   Neurological: She is alert and oriented to person, place, and time.   Psychiatric: She has a normal mood and affect.   Nursing note and vitals reviewed.      Significant Labs: All pertinent labs within the past 24 hours have been reviewed.    Significant Imaging: I have reviewed all pertinent imaging results/findings within the past 24 hours.   X-Ray Chest 1 View 12/29/17: Portable AP view of the chest was obtained.  Comparison -- 12/28.  The heart size is normal.  Mediastinal structures midline.  Lungs are expanded .  Patchy consolidation in the right lung toward the base is about the same.  Left lung continues to show widespread air space  consolidation with air bronchograms.  Lung consolidation could represent edema, pneumonia, aspiration, etc.  Mild bilateral pleural effusions are unchanged, left more than right.  A pleural catheter remains on the left near the diaphragm.  No new development is noted.

## 2017-12-29 NOTE — PROGRESS NOTES
Future Appointments  Date Time Provider Department Center   1/5/2018 12:45 PM [facility] PET CT1 LIMIT 500 LBS [facility] PET CT [facility]   1/5/2018 2:45 PM [facility] MRI1 [facility]     pt is now on 15 l --   per [facility] with [facility]  - there is a machine available that could deliver 10 l per min but pt would need ambulance transport home.  The portable tank would only last 30 min vs 4 hours with a pt on 2l/min per [place] .       1906 pm  - in the event pt is discharged this w/e   [facility] swapped out pt's portable O2 tank - Aurora Nuñez at [facility] aware.  old tank:  [id number]; new tank:  [id number];   [id number].    pt will get resp therapist to set tank up.

## 2017-12-29 NOTE — ASSESSMENT & PLAN NOTE
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  Wean down to low flow nasal cannula.  Give scheduled nebulizer treatments due to asthma history and wheeze on exam.  Continue prophylactic enoxaparin.  Pulmonology placed Pleur-X catheter.  Started chemotherapy.  Steroids started for brain metastases, and pantoprazole started due to abdominal complaints.  Give vanilla Boost supplements.  Next chemotherapy 3 weeks from 12/27/17.

## 2017-12-29 NOTE — PLAN OF CARE
Problem: Patient Care Overview  Goal: Plan of Care Review  Outcome: Ongoing (interventions implemented as appropriate)  Pt on HFNC 40% 15L sats 96%,   The proper method of use, as well as anticipated side effects, of this aerosol treatment are discussed and demonstrated to the patient.  Will continue to monitor.

## 2017-12-29 NOTE — PLAN OF CARE
Problem: Patient Care Overview  Goal: Plan of Care Review  Outcome: Ongoing (interventions implemented as appropriate)  The proper method of use, as well as anticipated side effects, of this aerosol treatment are discussed and demonstrated to the patient.   Will continue to monitor.

## 2017-12-29 NOTE — PLAN OF CARE
Problem: Physical Therapy Goal  Goal: Physical Therapy Goal  Goals to be met by: 18     Patient will increase functional independence with mobility by performin. Sit to stand transfer with [place]  2. Bed to chair transfer with [place]  3. Gait  x 150 feet with [place]      Outcome: Ongoing (interventions implemented as appropriate)     Pt continues to work toward established goals.

## 2017-12-29 NOTE — PROGRESS NOTES
[facility]  Hematology/Oncology  Progress Note    Patient Name: Yen Falcon  Admission Date: 12/26/2017  Hospital Length of Stay: 3 days  Code Status: Full Code     Subjective:     HPI:  41 yo female recently diagnosed with Stage 4 lung carcinoma, underwent left thoracentesis for symptomatic malignant pleural effusion on 12/22/17 - now admitted with worsening dyspnea, has rapid accumulation of pleural fluid and new effusion on right.    She is in the ICU.    Biopsy report of right axillary LN/subcutaneous nodule done 12/18/17 showed poorly differentiated carcinoma compatible with primary lung adenocarcinoma.    Interval History: Transferred to Floor    Oncology Treatment Plan:   CARBOPLATIN (AUC) + ETOPOSIDE    Medications:  Continuous Infusions:  Scheduled Meds:   albuterol-ipratropium 2.5mg-0.5mg/3mL  3 mL Nebulization Q4H    buPROPion  300 mg Oral Daily    dexamethasone  4 mg Intravenous Q6H    enoxaparin  40 mg Subcutaneous Daily    guaiFENesin  1,200 mg Oral BID    lactulose  15 g Oral BID    metoclopramide HCl  10 mg Oral TID AC    moxifloxacin  400 mg Oral Daily    nystatin  500,000 Units Oral QID    pantoprazole  40 mg Oral Daily    polyethylene glycol  17 g Oral BID    sodium chloride 0.9%  3 mL Intravenous Q8H     PRN Meds:acetaminophen, albuterol-ipratropium 2.5mg-0.5mg/3mL, alteplase, aluminum-magnesium hydroxide-simethicone, diphenhydrAMINE, heparin, porcine (PF), hydrocodone-acetaminophen 7.5-325mg, HYDROmorphone, LORazepam, naproxen sodium, ondansetron, sodium chloride 0.9%, sodium chloride 0.9%     Review of Systems   Constitutional: Positive for activity change, fatigue and unexpected weight change. Negative for fever.   HENT: Negative for mouth sores and nosebleeds.    Eyes: Negative for photophobia and pain.   Respiratory: Negative for cough, shortness of breath and wheezing.    Cardiovascular: Negative for chest pain and palpitations.   Gastrointestinal:  Negative for abdominal pain and vomiting.   Genitourinary: Negative for flank pain and hematuria.   Musculoskeletal: Positive for arthralgias. Negative for neck pain and neck stiffness.   Skin: Negative for rash and wound.   Neurological: Negative for seizures and syncope.   Hematological: Negative for adenopathy. Does not bruise/bleed easily.   Psychiatric/Behavioral: Negative for behavioral problems and confusion. The patient is nervous/anxious.    All other systems reviewed and are negative.    Objective:     Vital Signs (Most Recent):  Temp: 96.6 °F (35.9 °C) (12/29/17 1048)  Pulse: (!) 117 (12/29/17 1133)  Resp: 18 (12/29/17 1133)  BP: (!) 147/86 (12/29/17 1048)  SpO2: 96 % (12/29/17 1133) Vital Signs (24h Range):  Temp:  [96.6 °F (35.9 °C)-98.6 °F (37 °C)] 96.6 °F (35.9 °C)  Pulse:  [101-125] 117  Resp:  [15-33] 18  SpO2:  [90 %-99 %] 96 %  BP: (104-158)/(71-96) 147/86     Weight: 45 kg (99 lb 3.3 oz)  Body mass index is 17.57 kg/m².  Body surface area is 1.41 meters squared.      Intake/Output Summary (Last 24 hours) at 12/29/17 1340  Last data filed at 12/29/17 0800   Gross per 24 hour   Intake             1200 ml   Output              450 ml   Net              750 ml       Physical Exam   Constitutional: She is cooperative. She does not appear ill. No distress.   HENT:   Head: Head is without laceration, without right periorbital erythema and without left periorbital erythema.   Nose: No epistaxis.   Mouth/Throat: Oropharynx is clear and moist. No oropharyngeal exudate. No tonsillar exudate.   Eyes: Conjunctivae are normal.   Neck: Neck supple. No thyroid mass and no thyromegaly present.   Cardiovascular: Normal rate and regular rhythm.  Exam reveals no friction rub.    Pulmonary/Chest: No accessory muscle usage or stridor. No tachypnea. No respiratory distress. She has decreased breath sounds in the right lower field, the left middle field and the left lower field.         Chest wall is not dull to  percussion. She exhibits no tenderness.   Abdominal: Soft. She exhibits no distension, no ascites and no mass. There is no hepatosplenomegaly.   Musculoskeletal: She exhibits no edema.   Lymphadenopathy:        Head (right side): No submental and no submandibular adenopathy present.        Head (left side): No submental and no submandibular adenopathy present.     She has no cervical adenopathy.     She has no axillary adenopathy.   Neurological: She is alert. She has normal strength. No cranial nerve deficit.   Skin: No bruising, no petechiae and no rash noted. She is not diaphoretic.   Psychiatric: Her behavior is normal. Thought content normal. Cognition and memory are normal. She does not exhibit a depressed mood.   Vitals reviewed.      Significant Labs:   All pertinent labs from the last 24 hours have been reviewed.    Diagnostic Results:  I have reviewed all pertinent imaging results/findings within the past 24 hours.    Assessment/Plan:     Adenocarcinoma of left lung, stage 4    41 yo female with aggressive Stage 4 poorly differentiated lung adenocarcinoma with rapidly progressive disease.    Will change Dexamethasone from IV to po - 4 mg QID - for asymptomatic brain mets with edema    Refer to out-pt RadOnc after completion of chemotherapy.    3rd dose of Etoposide chemotherapy scheduled for tomorrow.    After that she can be discharged home, if her breathing improves and if her home oxygen can be arranged - as currently she is requiring high flow oxygen                  Vinnie Quiros MD  Hematology/Oncology  [facility]

## 2017-12-29 NOTE — PLAN OF CARE
Pt feels that she needs a breathing treatment, that she feels tight, left upper ant and post courser, than earlier, fine exp wheeze post left, diminished,  Heart rate up 120 st, temp 98.6, no nausea, resp 30, unlabored, on high flow 15l 40%  No dec since earlier, will need to monitor temp,    1815 breathing treatment given per resp therapist, feels better,   1820 started on the protonix, told that we have an order for soap suds enema prn, that she should have one tonight, I think it would help, her relieve the fullness

## 2017-12-30 PROBLEM — R68.81 EARLY SATIETY: Status: ACTIVE | Noted: 2017-12-30

## 2017-12-30 LAB
ALBUMIN SERPL BCP-MCNC: 1.9 G/DL
ALP SERPL-CCNC: 158 U/L
ALT SERPL W/O P-5'-P-CCNC: 62 U/L
ANION GAP SERPL CALC-SCNC: 10 MMOL/L
AST SERPL-CCNC: 36 U/L
BACTERIA SPEC AEROBE CULT: NORMAL
BASOPHILS # BLD AUTO: ABNORMAL K/UL
BASOPHILS NFR BLD: 0 %
BILIRUB SERPL-MCNC: 0.3 MG/DL
BUN SERPL-MCNC: 17 MG/DL
CALCIUM SERPL-MCNC: 8.4 MG/DL
CHLORIDE SERPL-SCNC: 100 MMOL/L
CO2 SERPL-SCNC: 24 MMOL/L
CREAT SERPL-MCNC: 0.6 MG/DL
DIFFERENTIAL METHOD: ABNORMAL
EOSINOPHIL # BLD AUTO: ABNORMAL K/UL
EOSINOPHIL NFR BLD: 0 %
ERYTHROCYTE [DISTWIDTH] IN BLOOD BY AUTOMATED COUNT: 12.8 %
EST. GFR  (AFRICAN AMERICAN): >60 ML/MIN/1.73 M^2
EST. GFR  (NON AFRICAN AMERICAN): >60 ML/MIN/1.73 M^2
GLUCOSE SERPL-MCNC: 142 MG/DL
GRAM STN SPEC: NORMAL
HCT VFR BLD AUTO: 36.1 %
HGB BLD-MCNC: 11.4 G/DL
HYPOCHROMIA BLD QL SMEAR: ABNORMAL
LYMPHOCYTES # BLD AUTO: ABNORMAL K/UL
LYMPHOCYTES NFR BLD: 8 %
MAGNESIUM SERPL-MCNC: 1.8 MG/DL
MCH RBC QN AUTO: 28.1 PG
MCHC RBC AUTO-ENTMCNC: 31.6 G/DL
MCV RBC AUTO: 89 FL
MONOCYTES # BLD AUTO: ABNORMAL K/UL
MONOCYTES NFR BLD: 0 %
NEUTROPHILS NFR BLD: 91 %
NEUTS BAND NFR BLD MANUAL: 1 %
PHOSPHATE SERPL-MCNC: 3.1 MG/DL
PLATELET # BLD AUTO: 299 K/UL
PLATELET BLD QL SMEAR: ABNORMAL
PMV BLD AUTO: 9.7 FL
POTASSIUM SERPL-SCNC: 4.4 MMOL/L
PROT SERPL-MCNC: 5.7 G/DL
RBC # BLD AUTO: 4.05 M/UL
SODIUM SERPL-SCNC: 134 MMOL/L
WBC # BLD AUTO: 22.24 K/UL

## 2017-12-30 PROCEDURE — 80053 COMPREHEN METABOLIC PANEL: CPT

## 2017-12-30 PROCEDURE — 94761 N-INVAS EAR/PLS OXIMETRY MLT: CPT

## 2017-12-30 PROCEDURE — 63600175 PHARM REV CODE 636 W HCPCS: Performed by: HOSPITALIST

## 2017-12-30 PROCEDURE — 11000001 HC ACUTE MED/SURG PRIVATE ROOM

## 2017-12-30 PROCEDURE — 25000242 PHARM REV CODE 250 ALT 637 W/ HCPCS: Performed by: HOSPITALIST

## 2017-12-30 PROCEDURE — 36415 COLL VENOUS BLD VENIPUNCTURE: CPT

## 2017-12-30 PROCEDURE — 99223 1ST HOSP IP/OBS HIGH 75: CPT | Mod: ,,, | Performed by: INTERNAL MEDICINE

## 2017-12-30 PROCEDURE — 27100092 HC HIGH FLOW DELIVERY CANNULA

## 2017-12-30 PROCEDURE — 84100 ASSAY OF PHOSPHORUS: CPT

## 2017-12-30 PROCEDURE — 94640 AIRWAY INHALATION TREATMENT: CPT

## 2017-12-30 PROCEDURE — 27100171 HC OXYGEN HIGH FLOW UP TO 24 HOURS

## 2017-12-30 PROCEDURE — 63600175 PHARM REV CODE 636 W HCPCS: Performed by: INTERNAL MEDICINE

## 2017-12-30 PROCEDURE — 83735 ASSAY OF MAGNESIUM: CPT

## 2017-12-30 PROCEDURE — A4216 STERILE WATER/SALINE, 10 ML: HCPCS | Performed by: EMERGENCY MEDICINE

## 2017-12-30 PROCEDURE — 25000003 PHARM REV CODE 250: Performed by: INTERNAL MEDICINE

## 2017-12-30 PROCEDURE — 25000003 PHARM REV CODE 250: Performed by: EMERGENCY MEDICINE

## 2017-12-30 PROCEDURE — 25000003 PHARM REV CODE 250: Performed by: HOSPITALIST

## 2017-12-30 PROCEDURE — 85027 COMPLETE CBC AUTOMATED: CPT

## 2017-12-30 PROCEDURE — 85007 BL SMEAR W/DIFF WBC COUNT: CPT

## 2017-12-30 RX ORDER — BISACODYL 10 MG
10 SUPPOSITORY, RECTAL RECTAL DAILY PRN
Status: DISCONTINUED | OUTPATIENT
Start: 2017-12-30 | End: 2018-01-08 | Stop reason: HOSPADM

## 2017-12-30 RX ORDER — SODIUM CHLORIDE 0.9 % (FLUSH) 0.9 %
10 SYRINGE (ML) INJECTION
Status: DISCONTINUED | OUTPATIENT
Start: 2017-12-30 | End: 2018-01-08 | Stop reason: HOSPADM

## 2017-12-30 RX ORDER — IPRATROPIUM BROMIDE AND ALBUTEROL SULFATE 2.5; .5 MG/3ML; MG/3ML
3 SOLUTION RESPIRATORY (INHALATION)
Status: DISCONTINUED | OUTPATIENT
Start: 2017-12-30 | End: 2018-01-06

## 2017-12-30 RX ADMIN — DEXAMETHASONE 4 MG: 4 TABLET ORAL at 06:12

## 2017-12-30 RX ADMIN — NYSTATIN 500000 UNITS: 500000 SUSPENSION ORAL at 11:12

## 2017-12-30 RX ADMIN — ETOPOSIDE 142 MG: 20 INJECTION, SOLUTION, CONCENTRATE INTRAVENOUS at 11:12

## 2017-12-30 RX ADMIN — HYDROMORPHONE HYDROCHLORIDE 1 MG: 2 INJECTION INTRAMUSCULAR; INTRAVENOUS; SUBCUTANEOUS at 12:12

## 2017-12-30 RX ADMIN — POLYETHYLENE GLYCOL 3350 17 G: 17 POWDER, FOR SOLUTION ORAL at 09:12

## 2017-12-30 RX ADMIN — PANTOPRAZOLE SODIUM 40 MG: 40 TABLET, DELAYED RELEASE ORAL at 09:12

## 2017-12-30 RX ADMIN — SODIUM CHLORIDE: 900 INJECTION, SOLUTION INTRAVENOUS at 10:12

## 2017-12-30 RX ADMIN — GUAIFENESIN 1200 MG: 600 TABLET, EXTENDED RELEASE ORAL at 09:12

## 2017-12-30 RX ADMIN — POLYETHYLENE GLYCOL 3350 17 G: 17 POWDER, FOR SOLUTION ORAL at 08:12

## 2017-12-30 RX ADMIN — LACTULOSE 15 G: 20 SOLUTION ORAL at 09:12

## 2017-12-30 RX ADMIN — HYDROMORPHONE HYDROCHLORIDE 1 MG: 2 INJECTION INTRAMUSCULAR; INTRAVENOUS; SUBCUTANEOUS at 05:12

## 2017-12-30 RX ADMIN — SODIUM CHLORIDE, PRESERVATIVE FREE 3 ML: 5 INJECTION INTRAVENOUS at 09:12

## 2017-12-30 RX ADMIN — IPRATROPIUM BROMIDE AND ALBUTEROL SULFATE 3 ML: .5; 3 SOLUTION RESPIRATORY (INHALATION) at 04:12

## 2017-12-30 RX ADMIN — DEXAMETHASONE 4 MG: 4 TABLET ORAL at 11:12

## 2017-12-30 RX ADMIN — MOXIFLOXACIN HYDROCHLORIDE 400 MG: 400 TABLET, FILM COATED ORAL at 09:12

## 2017-12-30 RX ADMIN — DEXAMETHASONE 4 MG: 4 TABLET ORAL at 12:12

## 2017-12-30 RX ADMIN — IPRATROPIUM BROMIDE AND ALBUTEROL SULFATE 3 ML: .5; 3 SOLUTION RESPIRATORY (INHALATION) at 07:12

## 2017-12-30 RX ADMIN — SODIUM CHLORIDE, PRESERVATIVE FREE 3 ML: 5 INJECTION INTRAVENOUS at 02:12

## 2017-12-30 RX ADMIN — LACTULOSE 15 G: 20 SOLUTION ORAL at 08:12

## 2017-12-30 RX ADMIN — NYSTATIN 500000 UNITS: 500000 SUSPENSION ORAL at 12:12

## 2017-12-30 RX ADMIN — HYDROCODONE BITARTRATE AND ACETAMINOPHEN 1 TABLET: 7.5; 325 TABLET ORAL at 04:12

## 2017-12-30 RX ADMIN — PROMETHAZINE HYDROCHLORIDE 25 MG: 25 INJECTION INTRAMUSCULAR; INTRAVENOUS at 10:12

## 2017-12-30 RX ADMIN — METOCLOPRAMIDE 10 MG: 10 TABLET ORAL at 06:12

## 2017-12-30 RX ADMIN — NYSTATIN 500000 UNITS: 500000 SUSPENSION ORAL at 06:12

## 2017-12-30 RX ADMIN — HYDROMORPHONE HYDROCHLORIDE 1 MG: 2 INJECTION INTRAMUSCULAR; INTRAVENOUS; SUBCUTANEOUS at 10:12

## 2017-12-30 RX ADMIN — ENOXAPARIN SODIUM 40 MG: 100 INJECTION SUBCUTANEOUS at 04:12

## 2017-12-30 RX ADMIN — IPRATROPIUM BROMIDE AND ALBUTEROL SULFATE 3 ML: .5; 3 SOLUTION RESPIRATORY (INHALATION) at 08:12

## 2017-12-30 RX ADMIN — IPRATROPIUM BROMIDE AND ALBUTEROL SULFATE 3 ML: .5; 3 SOLUTION RESPIRATORY (INHALATION) at 01:12

## 2017-12-30 RX ADMIN — METOCLOPRAMIDE 10 MG: 10 TABLET ORAL at 04:12

## 2017-12-30 RX ADMIN — BUPROPION HYDROCHLORIDE 300 MG: 150 TABLET, FILM COATED, EXTENDED RELEASE ORAL at 09:12

## 2017-12-30 RX ADMIN — IPRATROPIUM BROMIDE AND ALBUTEROL SULFATE 3 ML: .5; 3 SOLUTION RESPIRATORY (INHALATION) at 12:12

## 2017-12-30 RX ADMIN — SODIUM CHLORIDE, PRESERVATIVE FREE 3 ML: 5 INJECTION INTRAVENOUS at 06:12

## 2017-12-30 RX ADMIN — METOCLOPRAMIDE 10 MG: 10 TABLET ORAL at 10:12

## 2017-12-30 NOTE — SUBJECTIVE & OBJECTIVE
Past Medical History:   Diagnosis Date    Allergy     Angio-edema     Asthma     Malignant neoplasm of left lung stage 4 2017    Recurrent upper respiratory infection (URI)     Urticaria        Past Surgical History:   Procedure Laterality Date     SECTION      2 occurences       Review of patient's allergies indicates:   Allergen Reactions    Doxycycline Rash    Cat/feline products      Family History     Problem Relation (Age of Onset)    Allergies Father    Asthma Father    Breast cancer Maternal Grandfather, Maternal Aunt        Social History Main Topics    Smoking status: Former Smoker     Packs/day: 0.25     Quit date: 2013    Smokeless tobacco: Never Used    Alcohol use Yes    Drug use: No    Sexual activity: Yes     Partners: Male     Birth control/ protection: OCP      Comment:      Review of Systems  Objective:     Vital Signs (Most Recent):  Temp: 97.8 °F (36.6 °C) (17 1051)  Pulse: (!) 111 (17 1121)  Resp: 20 (17 1121)  BP: 112/84 (17 1121)  SpO2: (!) 91 % (17 0817) Vital Signs (24h Range):  Temp:  [96.5 °F (35.8 °C)-98.1 °F (36.7 °C)] 97.8 °F (36.6 °C)  Pulse:  [111-119] 111  Resp:  [18-20] 20  SpO2:  [91 %-96 %] 91 %  BP: (112-153)/(73-90) 112/84     Weight: 45 kg (99 lb 3.3 oz) (17 2215)  Body mass index is 17.57 kg/m².      Intake/Output Summary (Last 24 hours) at 17 1200  Last data filed at 17 1106   Gross per 24 hour   Intake              790 ml   Output                0 ml   Net              790 ml       Lines/Drains/Airways     Drain                 Chest Tube 17 1110 1 Left Other (Comment) 3 days         Drain/Device  17 1100 Left anterior chest other (see comments) 3 days          Peripheral Intravenous Line                 Peripheral IV - Single Lumen 17 1322 Left Antecubital 3 days                Physical Exam   Constitutional: She is oriented to person, place, and time. No distress.    Emaciated , mildly dyspnic   HENT:   Head: Normocephalic and atraumatic.   Nose: Nose normal.   Eyes: Conjunctivae and EOM are normal. Pupils are equal, round, and reactive to light. No scleral icterus.   Neck: Neck supple.   Cardiovascular: Normal rate and regular rhythm.  Exam reveals no gallop.    No murmur heard.  Pulmonary/Chest: Effort normal. No stridor. No respiratory distress. She has no wheezes. She has no rales.   diminished BS bases   Abdominal: Soft. Bowel sounds are normal. She exhibits no mass. There is no tenderness. There is no rebound.   Musculoskeletal: She exhibits no edema or tenderness.   Neurological: She is alert and oriented to person, place, and time. No cranial nerve deficit. Coordination normal.   Skin: Skin is warm and dry. No rash noted. She is not diaphoretic. No erythema.       Significant Labs:  CBC:   Recent Labs  Lab 12/30/17  0503   WBC 22.24*   HGB 11.4*   HCT 36.1*        CMP:   Recent Labs  Lab 12/30/17  0503   *   CALCIUM 8.4*   ALBUMIN 1.9*   PROT 5.7*   *   K 4.4   CO2 24      BUN 17   CREATININE 0.6   ALKPHOS 158*   ALT 62*   AST 36   BILITOT 0.3     Lipase: No results for input(s): LIPASE in the last 48 hours.    Significant Imaging:  Imaging results within the past 24 hours have been reviewed.

## 2017-12-30 NOTE — CONSULTS
[facility]  Gastroenterology  Consult Note    Patient Name: Yen Falcon  MRN: [id number]  Admission Date: 12/26/2017  Hospital Length of Stay: 4 days  Code Status: Full Code   Attending Provider: Kristian Chiang MD   Consulting Provider: Bill Edge Jr, MD  Primary Care Physician: Lauren Goodman MD  Principal Problem:Acute hypoxemic respiratory failure    Consults  Subjective:     HPI:   Yen Falcon is  42 y.o. Female with advanced metastatic cancer and recurring pleural effusions  Has required thoracenteses and PleurX catheter placement.  Consult in reference to abdominal bloating , early satiety, constipation  Oral intake is scant at present   Minimal BM despite miralax, lactulose, enemas  CT 12/27 not suggestive of bowel obstruction. Bladder was distended  No PMH of ulcer disease or laxative dependency  Of note she has metastasis to liver , kidney, peritoneal  Insignificant peritoneal fluid      PMH:  left malignant pleural effusion since 10/30/17 due to stage 4 left lung carcinoma.  She is a former smoker, but quit when she was diagnosed with asthma.  She lives in [place], [place].              She was found to have a left pleural effusion at [facility] on 10/30/17 that was subsequently found to be due to metastatic lung cancer after a biopsy of a right axillary lymph node metastasis on 12/18/17.   Now with bilateral effusions              She returned to [facility] the day after [place] with worsening shortness of breath confirmed to be due to effusion reaccumulation on chest x-ray.   Dr. Foote and Dr. Manzano drained another 1.1 liter and subsequent Pleur-X catheter placement.    Past Medical History:   Diagnosis Date    Allergy     Angio-edema     Asthma     Malignant neoplasm of left lung stage 4 12/18/2017    Recurrent upper respiratory infection (URI)     Urticaria        Past Surgical History:   Procedure Laterality  Date     SECTION      2 occurences       Review of patient's allergies indicates:   Allergen Reactions    Doxycycline Rash    Cat/feline products      Family History     Problem Relation (Age of Onset)    Allergies Father    Asthma Father    Breast cancer Maternal Grandfather, Maternal Aunt        Social History Main Topics    Smoking status: Former Smoker     Packs/day: 0.25     Quit date: 2013    Smokeless tobacco: Never Used    Alcohol use Yes    Drug use: No    Sexual activity: Yes     Partners: Male     Birth control/ protection: OCP      Comment:      Review of Systems  Objective:     Vital Signs (Most Recent):  Temp: 97.8 °F (36.6 °C) (17 1051)  Pulse: (!) 111 (17 1121)  Resp: 20 (17 1121)  BP: 112/84 (17 1121)  SpO2: (!) 91 % (17 0817) Vital Signs (24h Range):  Temp:  [96.5 °F (35.8 °C)-98.1 °F (36.7 °C)] 97.8 °F (36.6 °C)  Pulse:  [111-119] 111  Resp:  [18-20] 20  SpO2:  [91 %-96 %] 91 %  BP: (112-153)/(73-90) 112/84     Weight: 45 kg (99 lb 3.3 oz) (17 2215)  Body mass index is 17.57 kg/m².      Intake/Output Summary (Last 24 hours) at 17 1200  Last data filed at 17 1106   Gross per 24 hour   Intake              790 ml   Output                0 ml   Net              790 ml       Lines/Drains/Airways     Drain                 Chest Tube 17 1110 1 Left Other (Comment) 3 days         Drain/Device  17 1100 Left anterior chest other (see comments) 3 days          Peripheral Intravenous Line                 Peripheral IV - Single Lumen 17 1322 Left Antecubital 3 days                Physical Exam   Constitutional: She is oriented to person, place, and time. No distress.   Emaciated , mildly dyspnic   HENT:   Head: Normocephalic and atraumatic.   Nose: Nose normal.   Eyes: Conjunctivae and EOM are normal. Pupils are equal, round, and reactive to light. No scleral icterus.   Neck: Neck supple.   Cardiovascular: Normal  rate and regular rhythm.  Exam reveals no gallop.    No murmur heard.  Pulmonary/Chest: Effort normal. No stridor. No respiratory distress. She has no wheezes. She has no rales.   diminished BS bases   Abdominal: Soft. Bowel sounds are normal. She exhibits no mass. There is no tenderness. There is no rebound.   Musculoskeletal: She exhibits no edema or tenderness.   Neurological: She is alert and oriented to person, place, and time. No cranial nerve deficit. Coordination normal.   Skin: Skin is warm and dry. No rash noted. She is not diaphoretic. No erythema.       Significant Labs:  CBC:   Recent Labs  Lab 12/30/17  0503   WBC 22.24*   HGB 11.4*   HCT 36.1*        CMP:   Recent Labs  Lab 12/30/17  0503   *   CALCIUM 8.4*   ALBUMIN 1.9*   PROT 5.7*   *   K 4.4   CO2 24      BUN 17   CREATININE 0.6   ALKPHOS 158*   ALT 62*   AST 36   BILITOT 0.3     Lipase: No results for input(s): LIPASE in the last 48 hours.    Significant Imaging:  Imaging results within the past 24 hours have been reviewed.    Assessment/Plan:     Constipation    Plan  Continue miralax.  Dulcolax suppository today  Continue PPI  Avoid NSAIDs  Portable KUB  Bladder scan to r/o urinary retention            Thank you for your consult. I will follow-up with patient. Please contact us if you have any additional questions.    Bill Edge Jr, MD  Gastroenterology  [facility]

## 2017-12-30 NOTE — PT/OT/SLP PROGRESS
Physical Therapy      Patient Name:  Yen Falcon   MRN:  [id number]    Patient not seen today secondary to increased feeling of fatigue which pt attributed to not being able to sleep  . Will follow-up later today as able.    Isabell Burleson, PTA

## 2017-12-30 NOTE — SUBJECTIVE & OBJECTIVE
Interval History: Tired after getting phenergan prior to chemo today. Still with some abdominal pain when she tries to eat, but not as problematic with liquids.     Review of Systems   Constitutional: Negative for chills and fever.   Respiratory: Positive for cough and shortness of breath.    Cardiovascular: Negative for chest pain and palpitations.   Gastrointestinal: Negative for nausea and vomiting.     Objective:     Vital Signs (Most Recent):  Temp: 97.6 °F (36.4 °C) (12/30/17 1533)  Pulse: (!) 120 (12/30/17 1533)  Resp: 20 (12/30/17 1533)  BP: (!) 152/80 (12/30/17 1533)  SpO2: (!) 94 % (12/30/17 1319) Vital Signs (24h Range):  Temp:  [96.5 °F (35.8 °C)-98.1 °F (36.7 °C)] 97.6 °F (36.4 °C)  Pulse:  [102-120] 120  Resp:  [16-20] 20  SpO2:  [91 %-96 %] 94 %  BP: (112-153)/(73-90) 152/80     Weight: 45 kg (99 lb 3.3 oz)  Body mass index is 17.57 kg/m².    Intake/Output Summary (Last 24 hours) at 12/30/17 1715  Last data filed at 12/30/17 1300   Gross per 24 hour   Intake              890 ml   Output              150 ml   Net              740 ml      Physical Exam   Constitutional: She is oriented to person, place, and time. She appears well-developed. She appears distressed.   Cardiovascular: Normal rate and regular rhythm.    No murmur heard.  Pulmonary/Chest: Effort normal. No respiratory distress. She has decreased breath sounds. She has no wheezes.   Abdominal: Soft. She exhibits no distension. Bowel sounds are decreased. There is generalized tenderness.   Musculoskeletal: She exhibits no edema.   Neurological: She is alert and oriented to person, place, and time.   Skin: Skin is warm and dry.   Psychiatric: She has a normal mood and affect. Her behavior is normal.   Nursing note and vitals reviewed.      Significant Labs:   CBC:   Recent Labs  Lab 12/30/17  0503   WBC 22.24*   HGB 11.4*   HCT 36.1*        CMP:   Recent Labs  Lab 12/29/17  0426 12/30/17  0503   * 134*   K 4.4 4.4    100    CO2 26 24   * 142*   BUN 12 17   CREATININE 0.6 0.6   CALCIUM 8.1* 8.4*   PROT  --  5.7*   ALBUMIN  --  1.9*   BILITOT  --  0.3   ALKPHOS  --  158*   AST  --  36   ALT  --  62*   ANIONGAP 8 10   EGFRNONAA >60 >60     Magnesium:   Recent Labs  Lab 12/29/17  0426 12/30/17  0503   MG 1.9 1.8       Significant Imaging: I have reviewed all pertinent imaging results/findings within the past 24 hours.

## 2017-12-30 NOTE — NURSING
Etoposide initiated to left forearm saline lock (ok'd to give through saline lock per Dr. Quiros) via iv infusion pump w/Guardrails.  Prior to initiation, iv saline lock flushed very well without difficulty and had a great blood return.  /84 pulse 111 rr 20 temp 97.8.  Will continue to monitor.

## 2017-12-30 NOTE — NURSING
Etoposide infusion complete.  Infused without difficulty/no s/s of reaction noted or stated by patient.  IV site looks great, no redness, swelling, flushes well.  Reported off to Romina CHILDRESS.  Family at bedside.  Patient resting comfortably in bed.  VSS (see flowsheet).

## 2017-12-30 NOTE — PROGRESS NOTES
[facility]  Pulmonology  Progress Note    Patient Name: Yen Falcon  MRN: [id number]  Admission Date: 12/26/2017  Hospital Length of Stay: 4 days  Code Status: Full Code  Attending Provider: Kristian Chiang MD  Primary Care Provider: Lauren Goodman MD   Principal Problem: Acute hypoxemic respiratory failure    Subjective     Interval history  No acute events. Pt complained earlier this morning of not feeling well, non-specific. She received Dilaudid and Phenergan and is currently very sleepy, but rousable and appropriate. Remains on HFNC.      Objective     Vitals:    12/30/17 0817   BP:    Pulse: (!) 114   Resp: 18   Temp:        Physical Exam   Constitutional: She is oriented to person, place, and time. She appears somnolent and drowsy.   HENT: Head: Normocephalic and atraumatic. Right pre-auricular node    Cardiovascular: Normal rate and regular rhythm.    Pulmonary/Chest: Decreased breath sounds on the left with rhonchi and wheezes. Wheezes and crackles in right base.   Abdominal: Soft. BS robust. Mild TTP over epigastrium. No rebound tenderness.   Musculoskeletal: Normal range of motion.   Neurological: She is alert and oriented to person, place, and time.   Skin: Skin is warm and dry.       Labs    Recent Labs  Lab 12/26/17  1310 12/27/17  0747 12/30/17  0503   WBC 22.70* 21.98* 22.24*   HGB 12.2 11.8* 11.4*   HCT 37.4 37.1 36.1*   * 404* 299         Recent Labs  Lab 12/26/17  1310 12/27/17  0747 12/29/17  0426 12/30/17  0503   * 133* 135* 134*   K 4.1 4.5 4.4 4.4    102 101 100   CO2 24 22* 26 24   BUN 15 15 12 17   CREATININE 0.6 0.6 0.6 0.6   CALCIUM 8.4* 8.0* 8.1* 8.4*   PROT 6.0  --   --  5.7*   BILITOT 0.3  --   --  0.3   ALKPHOS 131  --   --  158*   *  --   --  62*   AST 63*  --   --  36         Assessment / Recommendations                Adenocarcinoma of left lung, stage 4     · Left lung hilar mass, widely metastatic including brain, chest,  abdomen  · Right axillary LN biopsied at [facility] on 12/18   · Axillary node showed adenocarcinoma  · Chemotherapy started per Dr. Quiros on 12/27, well tolerated thus far although it may be causing her malaise today       Bilateral pneumonia     · Post-obstructive pneumonia from tumor burden  · Not toxic, finishing a moxi course       Acute hypoxemic respiratory failure     · Requiring CF/HFNC to maintain sats and comfort  · Wean supplemental O2 as tolerated  · Arrange for home O2       Pleural effusions, bilateral     · Secondary to malignancy  · thoracentesis 12/22/17 - 1L removed   · Thoracentesis repeated in ED on admit - 1.2 L removed  · PleurX catheter placed on left at bedside 12/27  · Has a right-sided effusion building up, may need a second PleurX in future; will go by symptoms and sats  · Pt and family will require training in PleurX management; they have agreed to a session in two days          An important question is whether she will need a PleurX on the right; will monitor sats and symptoms daily. Await GI evaluation.     Plan discussed by Dr. Rose, staff, and Dr. Chiang with Pt, mother, and aunt at bedside.     Bryan Dennis, PGY-5  Pulmonary & Critical Care Medicine  pager [phone]

## 2017-12-30 NOTE — ASSESSMENT & PLAN NOTE
Plan  Continue miralax.  Dulcolax suppository today  Continue PPI  Avoid NSAIDs  Portable KUB  Bladder scan to r/o urinary retention

## 2017-12-30 NOTE — NURSING
Etoposide infusing without difficulty.  IV site without s/s of reaction or infection noted.  Patient states IV feels great and is not experiencing any s/s of transfusion reaction.  VSS, respiratory rate stable at 16.  Family at bedside.  Will continue to monitor.

## 2017-12-30 NOTE — HPI
Yen Falcon is  42 y.o. Female with advanced metastatic cancer and recurring pleural effusions  Has required thoracenteses and PleurX catheter placement.  Consult in reference to abdominal bloating , early satiety, constipation  Oral intake is scant at present   Minimal BM despite miralax, lactulose, enemas  CT 12/27 not suggestive of bowel obstruction. Bladder was distended  No PMH of ulcer disease or laxative dependency  Of note she has metastasis to liver , kidney, peritoneal  Insignificant peritoneal fluid      PMH:  left malignant pleural effusion since 10/30/17 due to stage 4 left lung carcinoma.  She is a former smoker, but quit when she was diagnosed with asthma.  She lives in [place], [place].              She was found to have a left pleural effusion at [facility] on 10/30/17 that was subsequently found to be due to metastatic lung cancer after a biopsy of a right axillary lymph node metastasis on 12/18/17.   Now with bilateral effusions              She returned to [facility] the day after [place] with worsening shortness of breath confirmed to be due to effusion reaccumulation on chest x-ray.   Dr. Foote and Dr. Manzano drained another 1.1 liter and subsequent Pleur-X catheter placement.

## 2017-12-30 NOTE — PT/OT/SLP PROGRESS
Physical Therapy      Patient Name:  Yen Falcon   MRN:  [id number]    Patient not seen today secondary to at 1st attempt pt not feeling well and 2nd attempt pt receiving chemo  . Spoke with nurse prior to 2nd attempt to treat nurse stated pt is receiving chemo. Will follow-up on Monday, January 1st.    Isabell Burleson, PTA

## 2017-12-30 NOTE — PLAN OF CARE
Problem: Patient Care Overview  Goal: Plan of Care Review  Outcome: Ongoing (interventions implemented as appropriate)  Pt received on comfort flow nasal cannula at  10  Lpm and 30% FiO2.  SPO2   91%.  Pt in no apparent respiratory distress.  Will continue to monitor.

## 2017-12-31 LAB
ALBUMIN SERPL BCP-MCNC: 1.9 G/DL
ALP SERPL-CCNC: 150 U/L
ALT SERPL W/O P-5'-P-CCNC: 54 U/L
ANION GAP SERPL CALC-SCNC: 11 MMOL/L
AST SERPL-CCNC: 35 U/L
BASOPHILS # BLD AUTO: 0.01 K/UL
BASOPHILS NFR BLD: 0.1 %
BILIRUB SERPL-MCNC: 0.3 MG/DL
BUN SERPL-MCNC: 20 MG/DL
CALCIUM SERPL-MCNC: 8.3 MG/DL
CHLORIDE SERPL-SCNC: 101 MMOL/L
CO2 SERPL-SCNC: 25 MMOL/L
CREAT SERPL-MCNC: 0.6 MG/DL
DIFFERENTIAL METHOD: ABNORMAL
EOSINOPHIL # BLD AUTO: 0 K/UL
EOSINOPHIL NFR BLD: 0.1 %
ERYTHROCYTE [DISTWIDTH] IN BLOOD BY AUTOMATED COUNT: 13 %
EST. GFR  (AFRICAN AMERICAN): >60 ML/MIN/1.73 M^2
EST. GFR  (NON AFRICAN AMERICAN): >60 ML/MIN/1.73 M^2
GLUCOSE SERPL-MCNC: 136 MG/DL
HCT VFR BLD AUTO: 33.5 %
HGB BLD-MCNC: 10.8 G/DL
LYMPHOCYTES # BLD AUTO: 1.1 K/UL
LYMPHOCYTES NFR BLD: 5.5 %
MAGNESIUM SERPL-MCNC: 1.9 MG/DL
MCH RBC QN AUTO: 28.7 PG
MCHC RBC AUTO-ENTMCNC: 32.2 G/DL
MCV RBC AUTO: 89 FL
MONOCYTES # BLD AUTO: 0.1 K/UL
MONOCYTES NFR BLD: 0.6 %
NEUTROPHILS # BLD AUTO: 18.4 K/UL
NEUTROPHILS NFR BLD: 92.8 %
PHOSPHATE SERPL-MCNC: 3.2 MG/DL
PLATELET # BLD AUTO: 243 K/UL
PMV BLD AUTO: 9.9 FL
POCT GLUCOSE: 130 MG/DL (ref 70–110)
POTASSIUM SERPL-SCNC: 4.6 MMOL/L
PROT SERPL-MCNC: 5.4 G/DL
RBC # BLD AUTO: 3.76 M/UL
SODIUM SERPL-SCNC: 137 MMOL/L
WBC # BLD AUTO: 19.83 K/UL

## 2017-12-31 PROCEDURE — 25000003 PHARM REV CODE 250: Performed by: EMERGENCY MEDICINE

## 2017-12-31 PROCEDURE — 94761 N-INVAS EAR/PLS OXIMETRY MLT: CPT

## 2017-12-31 PROCEDURE — A4216 STERILE WATER/SALINE, 10 ML: HCPCS | Performed by: EMERGENCY MEDICINE

## 2017-12-31 PROCEDURE — 85025 COMPLETE CBC W/AUTO DIFF WBC: CPT

## 2017-12-31 PROCEDURE — 36415 COLL VENOUS BLD VENIPUNCTURE: CPT

## 2017-12-31 PROCEDURE — 63600175 PHARM REV CODE 636 W HCPCS: Performed by: INTERNAL MEDICINE

## 2017-12-31 PROCEDURE — 83735 ASSAY OF MAGNESIUM: CPT

## 2017-12-31 PROCEDURE — 25000003 PHARM REV CODE 250: Performed by: HOSPITALIST

## 2017-12-31 PROCEDURE — 84100 ASSAY OF PHOSPHORUS: CPT

## 2017-12-31 PROCEDURE — 27100092 HC HIGH FLOW DELIVERY CANNULA

## 2017-12-31 PROCEDURE — 80053 COMPREHEN METABOLIC PANEL: CPT

## 2017-12-31 PROCEDURE — 25000242 PHARM REV CODE 250 ALT 637 W/ HCPCS: Performed by: HOSPITALIST

## 2017-12-31 PROCEDURE — 11000001 HC ACUTE MED/SURG PRIVATE ROOM

## 2017-12-31 PROCEDURE — 94640 AIRWAY INHALATION TREATMENT: CPT

## 2017-12-31 PROCEDURE — 63600175 PHARM REV CODE 636 W HCPCS: Performed by: HOSPITALIST

## 2017-12-31 RX ADMIN — HYDROCODONE BITARTRATE AND ACETAMINOPHEN 1 TABLET: 7.5; 325 TABLET ORAL at 12:12

## 2017-12-31 RX ADMIN — POLYETHYLENE GLYCOL 3350 17 G: 17 POWDER, FOR SOLUTION ORAL at 10:12

## 2017-12-31 RX ADMIN — DEXAMETHASONE 4 MG: 4 TABLET ORAL at 05:12

## 2017-12-31 RX ADMIN — DEXAMETHASONE 4 MG: 4 TABLET ORAL at 12:12

## 2017-12-31 RX ADMIN — MOXIFLOXACIN HYDROCHLORIDE 400 MG: 400 TABLET, FILM COATED ORAL at 10:12

## 2017-12-31 RX ADMIN — BUPROPION HYDROCHLORIDE 300 MG: 150 TABLET, FILM COATED, EXTENDED RELEASE ORAL at 10:12

## 2017-12-31 RX ADMIN — LACTULOSE 15 G: 20 SOLUTION ORAL at 10:12

## 2017-12-31 RX ADMIN — PANTOPRAZOLE SODIUM 40 MG: 40 TABLET, DELAYED RELEASE ORAL at 10:12

## 2017-12-31 RX ADMIN — IPRATROPIUM BROMIDE AND ALBUTEROL SULFATE 3 ML: .5; 3 SOLUTION RESPIRATORY (INHALATION) at 08:12

## 2017-12-31 RX ADMIN — IPRATROPIUM BROMIDE AND ALBUTEROL SULFATE 3 ML: .5; 3 SOLUTION RESPIRATORY (INHALATION) at 03:12

## 2017-12-31 RX ADMIN — HYDROMORPHONE HYDROCHLORIDE 1 MG: 2 INJECTION INTRAMUSCULAR; INTRAVENOUS; SUBCUTANEOUS at 10:12

## 2017-12-31 RX ADMIN — SODIUM CHLORIDE, PRESERVATIVE FREE 3 ML: 5 INJECTION INTRAVENOUS at 10:12

## 2017-12-31 RX ADMIN — NYSTATIN 500000 UNITS: 500000 SUSPENSION ORAL at 05:12

## 2017-12-31 RX ADMIN — HYDROMORPHONE HYDROCHLORIDE 1 MG: 2 INJECTION INTRAMUSCULAR; INTRAVENOUS; SUBCUTANEOUS at 02:12

## 2017-12-31 RX ADMIN — METOCLOPRAMIDE 10 MG: 10 TABLET ORAL at 05:12

## 2017-12-31 RX ADMIN — HYDROMORPHONE HYDROCHLORIDE 1 MG: 2 INJECTION INTRAMUSCULAR; INTRAVENOUS; SUBCUTANEOUS at 07:12

## 2017-12-31 RX ADMIN — METOCLOPRAMIDE 10 MG: 10 TABLET ORAL at 12:12

## 2017-12-31 RX ADMIN — HYDROMORPHONE HYDROCHLORIDE 1 MG: 2 INJECTION INTRAMUSCULAR; INTRAVENOUS; SUBCUTANEOUS at 12:12

## 2017-12-31 RX ADMIN — SODIUM CHLORIDE, PRESERVATIVE FREE 3 ML: 5 INJECTION INTRAVENOUS at 02:12

## 2017-12-31 RX ADMIN — SODIUM CHLORIDE, PRESERVATIVE FREE 3 ML: 5 INJECTION INTRAVENOUS at 06:12

## 2017-12-31 RX ADMIN — DEXAMETHASONE 4 MG: 4 TABLET ORAL at 11:12

## 2017-12-31 RX ADMIN — ENOXAPARIN SODIUM 40 MG: 100 INJECTION SUBCUTANEOUS at 05:12

## 2017-12-31 RX ADMIN — NYSTATIN 500000 UNITS: 500000 SUSPENSION ORAL at 12:12

## 2017-12-31 NOTE — ASSESSMENT & PLAN NOTE
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  Wean down to low flow nasal cannula as able.  Give scheduled nebulizer treatments due to asthma history and wheeze on exam.  Continue prophylactic enoxaparin.  Pulmonology placed Pleur-X catheter.  Started chemotherapy.  Steroids started for brain metastases, and pantoprazole started due to abdominal complaints.  Give vanilla Boost supplements.  Next chemotherapy 3 weeks from 12/27/17.

## 2017-12-31 NOTE — PLAN OF CARE
Problem: Patient Care Overview  Goal: Plan of Care Review  Outcome: Ongoing (interventions implemented as appropriate)  PATIENT IS CURRENTLY ON COMFORT FLOW OF 20 LPM; 30% FIO2. SPO2 IS 94%.Will continue to monitor.

## 2017-12-31 NOTE — PROGRESS NOTES
[facility] Hospital Medicine  Progress Note    Patient Name: Yen Falcon  MRN: [id number]  Patient Class: IP- Inpatient   Admission Date: 12/26/2017  Length of Stay: 5 days  Attending Physician: Kristian Chiang MD  Primary Care Provider: Lauren Goodman MD        Subjective:     Principal Problem:Acute hypoxemic respiratory failure    HPI:  Yen Falcon is  42 y.o.  woman (LMP: 11/26/17) with a history of recurrent upper respiratory infection, asthma diagnosed in 2013, smoking that she quit when she was diagnosed with asthma, a left malignant pleural effusion since 10/30/17 due to stage 4 left lung carcinoma.  She is a former smoker, but quit when she was diagnosed with asthma.  She lives in [place], [place].   She was found to have a left pleural effusion at [facility] on 10/30/17 that was subsequently found to be due to metastatic lung cancer after a biopsy of a right axillary lymph node metastasis on 12/18/17.  She was hospitalized at [facility] from 12/22/17 to 12/23/17 after being transferred from [facility] for hypoxia due to the effusion.  Interventional Radiology performed thoracentesis removing 1.25 liters.  She was seen by Pulmonology (Dr. Cheko Batista and Dr. Branden Manzano) who recommended prophylactic enoxaparin.  She was seen by hematologist Dr. Vinnie Quiros to establish care.  She was prescribed home oxygen with expectation for the effusion to reaccumulate.   She returned to [facility] the day after [place] with worsening shortness of breath confirmed to be due to effusion reaccumulation on chest x-ray.   Dr. Foote and Dr. Manzano drained another 1.1 liter and planned inpatient Pleur-X catheter placement, which would not be able to be done for another 2 days due to lack of staff.  She was readmitted to [facility] for this.    Hospital  Course:  She reported constipation since her last hospitalization so was given bisacodyl and polyethylene glycol.  Dr. Batista suspected she had adenocarcinoma and needed platinum based therapy.  Dr. Manzano obtained the biopsy results from [place], which showed that it was poorly differentiated adenocarcinoma.  They placed a PleurX catheter at bedside in the ICU.  After discussion with Dr. Quiros, it was decided she needed inpatient chemotherapy due to rapid progression.  She was started on carboplatin and etoposide.  Brain MRI and CT of the chest/abdomen/pelvis showed extensive metastases including in the brain, with vasogenic edema.  Dr. Quiros started steroids.  She required high flow nasal cannula.  She was transferred out of the ICU on 12/29/17.  She continued to complain of a sensation of food building up in her upper abdomen.  Pulmonology felt her pancreatic metastasis may be involved, and recommended Gastroenterology evaluation.    Interval History: Tired after getting phenergan prior to chemo today. Still with some abdominal pain when she tries to eat, but not as problematic with liquids.     Review of Systems   Constitutional: Negative for chills and fever.   Respiratory: Positive for cough and shortness of breath.    Cardiovascular: Negative for chest pain and palpitations.   Gastrointestinal: Negative for nausea and vomiting.     Objective:     Vital Signs (Most Recent):  Temp: 97.6 °F (36.4 °C) (12/30/17 1533)  Pulse: (!) 120 (12/30/17 1533)  Resp: 20 (12/30/17 1533)  BP: (!) 152/80 (12/30/17 1533)  SpO2: (!) 94 % (12/30/17 1319) Vital Signs (24h Range):  Temp:  [96.5 °F (35.8 °C)-98.1 °F (36.7 °C)] 97.6 °F (36.4 °C)  Pulse:  [102-120] 120  Resp:  [16-20] 20  SpO2:  [91 %-96 %] 94 %  BP: (112-153)/(73-90) 152/80     Weight: 45 kg (99 lb 3.3 oz)  Body mass index is 17.57 kg/m².    Intake/Output Summary (Last 24 hours) at 12/30/17 7382  Last data filed at 12/30/17 1300   Gross per 24 hour    Intake              890 ml   Output              150 ml   Net              740 ml      Physical Exam   Constitutional: She is oriented to person, place, and time. She appears well-developed. She appears distressed.   Cardiovascular: Normal rate and regular rhythm.    No murmur heard.  Pulmonary/Chest: Effort normal. No respiratory distress. She has decreased breath sounds. She has no wheezes.   Abdominal: Soft. She exhibits no distension. Bowel sounds are decreased. There is generalized tenderness.   Musculoskeletal: She exhibits no edema.   Neurological: She is alert and oriented to person, place, and time.   Skin: Skin is warm and dry.   Psychiatric: She has a normal mood and affect. Her behavior is normal.   Nursing note and vitals reviewed.      Significant Labs:   CBC:   Recent Labs  Lab 12/30/17  0503   WBC 22.24*   HGB 11.4*   HCT 36.1*        CMP:   Recent Labs  Lab 12/29/17  0426 12/30/17  0503   * 134*   K 4.4 4.4    100   CO2 26 24   * 142*   BUN 12 17   CREATININE 0.6 0.6   CALCIUM 8.1* 8.4*   PROT  --  5.7*   ALBUMIN  --  1.9*   BILITOT  --  0.3   ALKPHOS  --  158*   AST  --  36   ALT  --  62*   ANIONGAP 8 10   EGFRNONAA >60 >60     Magnesium:   Recent Labs  Lab 12/29/17  0426 12/30/17  0503   MG 1.9 1.8       Significant Imaging: I have reviewed all pertinent imaging results/findings within the past 24 hours.    Assessment/Plan:      * Acute hypoxemic respiratory failure    Multifactorial. Continue to wean from the comfort flow as able.           Constipation    Giving polyethylene glycol and lactulose. GI consulted as well.         Bilateral pneumonia    Continue moxifloxacin started last admission.          Anxiety    Continue bupropion and lorazepam PRN.          Pleural effusion on left    Admission for chest tube placement  Acute hypoxemic respiratory failure  Malignant neoplasm of left lung stage 4  Malignant neoplasm metastatic to lymph node of  axilla  Asthma  Continue supplemental oxygen.  Wean down to low flow nasal cannula as able.  Give scheduled nebulizer treatments due to asthma history and wheeze on exam.  Continue prophylactic enoxaparin.  Pulmonology placed Pleur-X catheter.  Started chemotherapy.  Steroids started for brain metastases, and pantoprazole started due to abdominal complaints.  Give vanilla Boost supplements.  Next chemotherapy 3 weeks from 12/27/17.          VTE Risk Mitigation         Ordered     heparin, porcine (PF) 100 unit/mL injection flush 500 Units  As needed (PRN)     Route:  Intravenous        12/27/17 1210     enoxaparin injection 40 mg  Daily     Route:  Subcutaneous        12/26/17 2223     Medium Risk of VTE  Once      12/26/17 2223              Kristian Chiang MD  Department of Hospital Medicine   [facility]

## 2017-12-31 NOTE — PT/OT/SLP PROGRESS
Physical Therapy      Patient Name:  Yen Falcon   MRN:  [id number]    Patient not seen today secondary to Patient unwilling to participate. 2nd attempt at 1057, pt declined therapy. Pt sleeping upon PT entrance. States she's tired. Family present. Will follow-up as able.    Milagros Arias, PTA

## 2017-12-31 NOTE — PROGRESS NOTES
"Notified YVETTE Smith of pt's bladder scan showing 424ccs. When asked pt if she would like to ambulate to urinate she stated "I will in a little while." Pt seemed drowsy. Order for straight cath received, will attempt to get pt to void on her own in 30 minutes and if unable, will straight cath.   "

## 2017-12-31 NOTE — PT/OT/SLP PROGRESS
Physical Therapy      Patient Name:  Yen Falcon   MRN:  [id number]    Patient not seen today secondary to  (Pt refused therapy due to requring time /c eating breakfast.). Attempted at 0957. Will follow-up as able.    Milagros Arias, PTA

## 2017-12-31 NOTE — PLAN OF CARE
Problem: Patient Care Overview  Goal: Plan of Care Review  Outcome: Ongoing (interventions implemented as appropriate)  Pt AAOx4, family members at bedside throughout shift. Pt complains of intermittent pain but denies n/v/d and SOB. Pt is tolerating clear liquid diet but is only eating a small portion of meal tray. Pt urinating without difficulty after bladder scanned and had a BM. High flow oxygen in use. Pleurex cath clamped, dressing CDI. Cardiac monitoring continued. Safety maintained, will cont to monitor.

## 2018-01-01 LAB
ALBUMIN SERPL BCP-MCNC: 2 G/DL
ALP SERPL-CCNC: 150 U/L
ALT SERPL W/O P-5'-P-CCNC: 49 U/L
ANION GAP SERPL CALC-SCNC: 11 MMOL/L
ANISOCYTOSIS BLD QL SMEAR: SLIGHT
AST SERPL-CCNC: 34 U/L
BASOPHILS NFR BLD: 0 %
BILIRUB SERPL-MCNC: 0.3 MG/DL
BUN SERPL-MCNC: 21 MG/DL
CALCIUM SERPL-MCNC: 8.4 MG/DL
CHLORIDE SERPL-SCNC: 101 MMOL/L
CO2 SERPL-SCNC: 27 MMOL/L
CREAT SERPL-MCNC: 0.6 MG/DL
DIFFERENTIAL METHOD: ABNORMAL
EOSINOPHIL NFR BLD: 0 %
ERYTHROCYTE [DISTWIDTH] IN BLOOD BY AUTOMATED COUNT: 13 %
EST. GFR  (AFRICAN AMERICAN): >60 ML/MIN/1.73 M^2
EST. GFR  (NON AFRICAN AMERICAN): >60 ML/MIN/1.73 M^2
GLUCOSE SERPL-MCNC: 141 MG/DL
HCT VFR BLD AUTO: 34.3 %
HGB BLD-MCNC: 10.7 G/DL
LYMPHOCYTES NFR BLD: 13 %
MAGNESIUM SERPL-MCNC: 1.8 MG/DL
MCH RBC QN AUTO: 28.2 PG
MCHC RBC AUTO-ENTMCNC: 31.2 G/DL
MCV RBC AUTO: 91 FL
MONOCYTES NFR BLD: 0 %
NEUTROPHILS NFR BLD: 87 %
PHOSPHATE SERPL-MCNC: 2.9 MG/DL
PLATELET # BLD AUTO: 237 K/UL
PLATELET BLD QL SMEAR: ABNORMAL
PMV BLD AUTO: 10.3 FL
POTASSIUM SERPL-SCNC: 4.4 MMOL/L
PROT SERPL-MCNC: 5.5 G/DL
RBC # BLD AUTO: 3.79 M/UL
SODIUM SERPL-SCNC: 139 MMOL/L
WBC # BLD AUTO: 23.07 K/UL

## 2018-01-01 PROCEDURE — 94761 N-INVAS EAR/PLS OXIMETRY MLT: CPT

## 2018-01-01 PROCEDURE — 63600175 PHARM REV CODE 636 W HCPCS: Performed by: INTERNAL MEDICINE

## 2018-01-01 PROCEDURE — 27100092 HC HIGH FLOW DELIVERY CANNULA

## 2018-01-01 PROCEDURE — 85007 BL SMEAR W/DIFF WBC COUNT: CPT

## 2018-01-01 PROCEDURE — 84100 ASSAY OF PHOSPHORUS: CPT

## 2018-01-01 PROCEDURE — 25000003 PHARM REV CODE 250: Performed by: EMERGENCY MEDICINE

## 2018-01-01 PROCEDURE — 36415 COLL VENOUS BLD VENIPUNCTURE: CPT

## 2018-01-01 PROCEDURE — 94640 AIRWAY INHALATION TREATMENT: CPT

## 2018-01-01 PROCEDURE — 25000003 PHARM REV CODE 250: Performed by: HOSPITALIST

## 2018-01-01 PROCEDURE — 83735 ASSAY OF MAGNESIUM: CPT

## 2018-01-01 PROCEDURE — 80053 COMPREHEN METABOLIC PANEL: CPT

## 2018-01-01 PROCEDURE — 85027 COMPLETE CBC AUTOMATED: CPT

## 2018-01-01 PROCEDURE — 11000001 HC ACUTE MED/SURG PRIVATE ROOM

## 2018-01-01 PROCEDURE — 25000242 PHARM REV CODE 250 ALT 637 W/ HCPCS: Performed by: HOSPITALIST

## 2018-01-01 PROCEDURE — 63600175 PHARM REV CODE 636 W HCPCS: Performed by: HOSPITALIST

## 2018-01-01 PROCEDURE — A4216 STERILE WATER/SALINE, 10 ML: HCPCS | Performed by: EMERGENCY MEDICINE

## 2018-01-01 RX ORDER — LACTULOSE 10 G/15ML
15 SOLUTION ORAL EVERY 6 HOURS PRN
Status: DISCONTINUED | OUTPATIENT
Start: 2018-01-01 | End: 2018-01-08 | Stop reason: HOSPADM

## 2018-01-01 RX ADMIN — SODIUM CHLORIDE, PRESERVATIVE FREE 3 ML: 5 INJECTION INTRAVENOUS at 05:01

## 2018-01-01 RX ADMIN — IPRATROPIUM BROMIDE AND ALBUTEROL SULFATE 3 ML: .5; 3 SOLUTION RESPIRATORY (INHALATION) at 12:01

## 2018-01-01 RX ADMIN — METOCLOPRAMIDE 10 MG: 10 TABLET ORAL at 05:01

## 2018-01-01 RX ADMIN — HYDROMORPHONE HYDROCHLORIDE 1 MG: 2 INJECTION INTRAMUSCULAR; INTRAVENOUS; SUBCUTANEOUS at 04:01

## 2018-01-01 RX ADMIN — POLYETHYLENE GLYCOL 3350 17 G: 17 POWDER, FOR SOLUTION ORAL at 08:01

## 2018-01-01 RX ADMIN — BUPROPION HYDROCHLORIDE 300 MG: 150 TABLET, FILM COATED, EXTENDED RELEASE ORAL at 09:01

## 2018-01-01 RX ADMIN — MOXIFLOXACIN HYDROCHLORIDE 400 MG: 400 TABLET, FILM COATED ORAL at 09:01

## 2018-01-01 RX ADMIN — DEXAMETHASONE 4 MG: 4 TABLET ORAL at 05:01

## 2018-01-01 RX ADMIN — METOCLOPRAMIDE 10 MG: 10 TABLET ORAL at 04:01

## 2018-01-01 RX ADMIN — DEXAMETHASONE 4 MG: 4 TABLET ORAL at 12:01

## 2018-01-01 RX ADMIN — IPRATROPIUM BROMIDE AND ALBUTEROL SULFATE 3 ML: .5; 3 SOLUTION RESPIRATORY (INHALATION) at 07:01

## 2018-01-01 RX ADMIN — NYSTATIN 500000 UNITS: 500000 SUSPENSION ORAL at 12:01

## 2018-01-01 RX ADMIN — METOCLOPRAMIDE 10 MG: 10 TABLET ORAL at 12:01

## 2018-01-01 RX ADMIN — PANTOPRAZOLE SODIUM 40 MG: 40 TABLET, DELAYED RELEASE ORAL at 09:01

## 2018-01-01 RX ADMIN — NYSTATIN 500000 UNITS: 500000 SUSPENSION ORAL at 05:01

## 2018-01-01 RX ADMIN — HYDROMORPHONE HYDROCHLORIDE 1 MG: 2 INJECTION INTRAMUSCULAR; INTRAVENOUS; SUBCUTANEOUS at 08:01

## 2018-01-01 RX ADMIN — SODIUM CHLORIDE, PRESERVATIVE FREE 3 ML: 5 INJECTION INTRAVENOUS at 02:01

## 2018-01-01 RX ADMIN — DIPHENHYDRAMINE HYDROCHLORIDE 25 MG: 25 CAPSULE ORAL at 01:01

## 2018-01-01 RX ADMIN — POLYETHYLENE GLYCOL 3350 17 G: 17 POWDER, FOR SOLUTION ORAL at 09:01

## 2018-01-01 RX ADMIN — NYSTATIN 500000 UNITS: 500000 SUSPENSION ORAL at 06:01

## 2018-01-01 RX ADMIN — ENOXAPARIN SODIUM 40 MG: 100 INJECTION SUBCUTANEOUS at 04:01

## 2018-01-01 RX ADMIN — DEXAMETHASONE 4 MG: 4 TABLET ORAL at 06:01

## 2018-01-01 RX ADMIN — HYDROMORPHONE HYDROCHLORIDE 1 MG: 2 INJECTION INTRAMUSCULAR; INTRAVENOUS; SUBCUTANEOUS at 09:01

## 2018-01-01 RX ADMIN — HYDROCODONE BITARTRATE AND ACETAMINOPHEN 1 TABLET: 7.5; 325 TABLET ORAL at 06:01

## 2018-01-01 RX ADMIN — LACTULOSE 15 G: 20 SOLUTION ORAL at 10:01

## 2018-01-01 NOTE — PROGRESS NOTES
[facility] Medicine  Progress Note    Patient Name: Yen Falcon  MRN: [id number]  Patient Class: IP- Inpatient   Admission Date: 12/26/2017  Length of Stay: 5 days  Attending Physician: Kristian Chiang MD  Primary Care Provider: Lauren Goodman MD        Subjective:     Principal Problem:Acute hypoxemic respiratory failure    HPI:  Yen Falcon is  42 y.o.  woman (LMP: 11/26/17) with a history of recurrent upper respiratory infection, asthma diagnosed in 2013, smoking that she quit when she was diagnosed with asthma, a left malignant pleural effusion since 10/30/17 due to stage 4 left lung carcinoma.  She is a former smoker, but quit when she was diagnosed with asthma.  She lives in [place], [place].   She was found to have a left pleural effusion at [facility] on 10/30/17 that was subsequently found to be due to metastatic lung cancer after a biopsy of a right axillary lymph node metastasis on 12/18/17.  She was hospitalized at [facility] from 12/22/17 to 12/23/17 after being transferred from [facility] for hypoxia due to the effusion.  Interventional Radiology performed thoracentesis removing 1.25 liters.  She was seen by Pulmonology (Dr. Cheko Batista and Dr. Branden Manzano) who recommended prophylactic enoxaparin.  She was seen by hematologist Dr. Vinnie Quiros to establish care.  She was prescribed home oxygen with expectation for the effusion to reaccumulate.   She returned to [facility] the day after [place] with worsening shortness of breath confirmed to be due to effusion reaccumulation on chest x-ray.   Dr. Foote and Dr. Manzano drained another 1.1 liter and planned inpatient Pleur-X catheter placement, which would not be able to be done for another 2 days due to lack of staff.  She was readmitted to [facility] for this.    Hospital  Course:  She reported constipation since her last hospitalization so was given bisacodyl and polyethylene glycol.  Dr. Batista suspected she had adenocarcinoma and needed platinum based therapy.  Dr. Manzano obtained the biopsy results from [place], which showed that it was poorly differentiated adenocarcinoma.  They placed a PleurX catheter at bedside in the ICU.  After discussion with Dr. Quiros, it was decided she needed inpatient chemotherapy due to rapid progression.  She was started on carboplatin and etoposide.  Brain MRI and CT of the chest/abdomen/pelvis showed extensive metastases including in the brain, with vasogenic edema.  Dr. Quiros started steroids.  She required high flow nasal cannula.  She was transferred out of the ICU on 12/29/17.  She continued to complain of a sensation of food building up in her upper abdomen.  Pulmonology felt her pancreatic metastasis may be involved, and recommended Gastroenterology evaluation.    Interval History: Sitting up in the bed and watching TV. Still with some upper abdominal pain, but is having BMs now. Tolerating the clear liquids. Pain is controlled with the dilaudid.    Review of Systems   Constitutional: Negative for chills and fever.   Respiratory: Positive for cough and shortness of breath.    Cardiovascular: Negative for chest pain and palpitations.   Gastrointestinal: Negative for nausea and vomiting.     Objective:     Vital Signs (Most Recent):  Temp: 98.1 °F (36.7 °C) (12/31/17 1623)  Pulse: 109 (12/31/17 1623)  Resp: 17 (12/31/17 1623)  BP: 131/70 (12/31/17 1623)  SpO2: (!) 92 % (12/31/17 1528) Vital Signs (24h Range):  Temp:  [98 °F (36.7 °C)-98.4 °F (36.9 °C)] 98.1 °F (36.7 °C)  Pulse:  [101-126] 109  Resp:  [17-18] 17  SpO2:  [92 %-94 %] 92 %  BP: (131-154)/(70-96) 131/70     Weight: 46.4 kg (102 lb 4.7 oz)  Body mass index is 18.12 kg/m².    Intake/Output Summary (Last 24 hours) at 12/31/17 1850  Last data filed at 12/31/17 1630   Gross  per 24 hour   Intake                0 ml   Output              250 ml   Net             -250 ml      Physical Exam   Constitutional: She is oriented to person, place, and time. She appears well-developed. She appears distressed.   Cardiovascular: Normal rate and regular rhythm.    No murmur heard.  Pulmonary/Chest: Effort normal. No respiratory distress. She has decreased breath sounds. She has no wheezes.   Abdominal: Soft. She exhibits no distension. Bowel sounds are decreased. There is generalized tenderness.   Musculoskeletal: She exhibits no edema.   Neurological: She is alert and oriented to person, place, and time.   Skin: Skin is warm and dry.   Psychiatric: She has a normal mood and affect. Her behavior is normal.   Nursing note and vitals reviewed.      Significant Labs:   CBC:   Recent Labs  Lab 12/30/17  0503 12/31/17  0601   WBC 22.24* 19.83*   HGB 11.4* 10.8*   HCT 36.1* 33.5*    243     CMP:   Recent Labs  Lab 12/30/17  0503 12/31/17  0601   * 137   K 4.4 4.6    101   CO2 24 25   * 136*   BUN 17 20   CREATININE 0.6 0.6   CALCIUM 8.4* 8.3*   PROT 5.7* 5.4*   ALBUMIN 1.9* 1.9*   BILITOT 0.3 0.3   ALKPHOS 158* 150*   AST 36 35   ALT 62* 54*   ANIONGAP 10 11   EGFRNONAA >60 >60     Magnesium:   Recent Labs  Lab 12/30/17  0503 12/31/17  0601   MG 1.8 1.9       Significant Imaging: I have reviewed all pertinent imaging results/findings within the past 24 hours.    Assessment/Plan:      * Acute hypoxemic respiratory failure    Multifactorial. Continue to wean from the comfort flow as able.           Constipation    Giving polyethylene glycol and lactulose. Had a couple BMs today. Continue to monitor.         Bilateral pneumonia    Continue moxifloxacin started last admission.          Anxiety    Continue bupropion and lorazepam PRN.          Pleural effusion on left    Admission for chest tube placement  Acute hypoxemic respiratory failure  Malignant neoplasm of left lung stage  4  Malignant neoplasm metastatic to lymph node of axilla  Asthma  Continue supplemental oxygen.  Wean down to low flow nasal cannula as able.  Give scheduled nebulizer treatments due to asthma history and wheeze on exam.  Continue prophylactic enoxaparin.  Pulmonology placed Pleur-X catheter.  Started chemotherapy.  Steroids started for brain metastases, and pantoprazole started due to abdominal complaints.  Give vanilla Boost supplements.  Next chemotherapy 3 weeks from 12/27/17.          VTE Risk Mitigation         Ordered     heparin, porcine (PF) 100 unit/mL injection flush 500 Units  As needed (PRN)     Route:  Intravenous        12/27/17 1210     enoxaparin injection 40 mg  Daily     Route:  Subcutaneous        12/26/17 2223     Medium Risk of VTE  Once      12/26/17 2223              Kristian Chiang MD  Department of Hospital Medicine   [facility]

## 2018-01-01 NOTE — PLAN OF CARE
Problem: Patient Care Overview  Goal: Plan of Care Review  Outcome: Ongoing (interventions implemented as appropriate)  PATIENT IS CURRENTLY ON 20 LPM; 30% FIO2 OF COMFORT FLOW HFNC. SPO2 IS 92%. Will continue to monitor.

## 2018-01-01 NOTE — SUBJECTIVE & OBJECTIVE
Interval History: Continuing to have liquid BMs today. Feeling better. Had some pleuritic chest pain on the right earlier.     Review of Systems   Constitutional: Negative for chills and fever.   Respiratory: Positive for cough. Negative for shortness of breath.    Cardiovascular: Negative for chest pain and palpitations.   Gastrointestinal: Negative for nausea and vomiting.     Objective:     Vital Signs (Most Recent):  Temp: 97.9 °F (36.6 °C) (01/01/18 1205)  Pulse: 63 (01/01/18 1220)  Resp: 18 (01/01/18 1220)  BP: 129/84 (01/01/18 1205)  SpO2: (!) 94 % (01/01/18 1220) Vital Signs (24h Range):  Temp:  [97.5 °F (36.4 °C)-98.7 °F (37.1 °C)] 97.9 °F (36.6 °C)  Pulse:  [] 63  Resp:  [16-20] 18  SpO2:  [92 %-95 %] 94 %  BP: (129-145)/(70-86) 129/84     Weight: 46.4 kg (102 lb 4.7 oz)  Body mass index is 18.12 kg/m².    Intake/Output Summary (Last 24 hours) at 01/01/18 1248  Last data filed at 01/01/18 0800   Gross per 24 hour   Intake              200 ml   Output              550 ml   Net             -350 ml      Physical Exam   Constitutional: She is oriented to person, place, and time. She appears well-developed. No distress.   Cardiovascular: Normal rate and regular rhythm.    No murmur heard.  Pulmonary/Chest: Effort normal. No respiratory distress. She has decreased breath sounds. She has no wheezes.   Abdominal: Soft. She exhibits no distension. Bowel sounds are decreased. There is tenderness in the epigastric area.   Musculoskeletal: She exhibits no edema.   Neurological: She is alert and oriented to person, place, and time.   Skin: Skin is warm and dry.   Psychiatric: She has a normal mood and affect. Her behavior is normal.   Nursing note and vitals reviewed.      Significant Labs:   CBC:   Recent Labs  Lab 12/31/17  0601 01/01/18  0427   WBC 19.83* 23.07*   HGB 10.8* 10.7*   HCT 33.5* 34.3*    237     CMP:   Recent Labs  Lab 12/31/17  0601 01/01/18  0427    139   K 4.6 4.4    101    CO2 25 27   * 141*   BUN 20 21*   CREATININE 0.6 0.6   CALCIUM 8.3* 8.4*   PROT 5.4* 5.5*   ALBUMIN 1.9* 2.0*   BILITOT 0.3 0.3   ALKPHOS 150* 150*   AST 35 34   ALT 54* 49*   ANIONGAP 11 11   EGFRNONAA >60 >60     Magnesium:   Recent Labs  Lab 12/31/17  0601 01/01/18  0427   MG 1.9 1.8       Significant Imaging: I have reviewed all pertinent imaging results/findings within the past 24 hours.

## 2018-01-01 NOTE — SUBJECTIVE & OBJECTIVE
Interval History: Sitting up in the bed and watching TV. Still with some upper abdominal pain, but is having BMs now. Tolerating the clear liquids. Pain is controlled with the dilaudid.    Review of Systems   Constitutional: Negative for chills and fever.   Respiratory: Positive for cough and shortness of breath.    Cardiovascular: Negative for chest pain and palpitations.   Gastrointestinal: Negative for nausea and vomiting.     Objective:     Vital Signs (Most Recent):  Temp: 98.1 °F (36.7 °C) (12/31/17 1623)  Pulse: 109 (12/31/17 1623)  Resp: 17 (12/31/17 1623)  BP: 131/70 (12/31/17 1623)  SpO2: (!) 92 % (12/31/17 1528) Vital Signs (24h Range):  Temp:  [98 °F (36.7 °C)-98.4 °F (36.9 °C)] 98.1 °F (36.7 °C)  Pulse:  [101-126] 109  Resp:  [17-18] 17  SpO2:  [92 %-94 %] 92 %  BP: (131-154)/(70-96) 131/70     Weight: 46.4 kg (102 lb 4.7 oz)  Body mass index is 18.12 kg/m².    Intake/Output Summary (Last 24 hours) at 12/31/17 1850  Last data filed at 12/31/17 1630   Gross per 24 hour   Intake                0 ml   Output              250 ml   Net             -250 ml      Physical Exam   Constitutional: She is oriented to person, place, and time. She appears well-developed. She appears distressed.   Cardiovascular: Normal rate and regular rhythm.    No murmur heard.  Pulmonary/Chest: Effort normal. No respiratory distress. She has decreased breath sounds. She has no wheezes.   Abdominal: Soft. She exhibits no distension. Bowel sounds are decreased. There is generalized tenderness.   Musculoskeletal: She exhibits no edema.   Neurological: She is alert and oriented to person, place, and time.   Skin: Skin is warm and dry.   Psychiatric: She has a normal mood and affect. Her behavior is normal.   Nursing note and vitals reviewed.      Significant Labs:   CBC:   Recent Labs  Lab 12/30/17  0503 12/31/17  0601   WBC 22.24* 19.83*   HGB 11.4* 10.8*   HCT 36.1* 33.5*    243     CMP:   Recent Labs  Lab 12/30/17  3413  12/31/17  0601   * 137   K 4.4 4.6    101   CO2 24 25   * 136*   BUN 17 20   CREATININE 0.6 0.6   CALCIUM 8.4* 8.3*   PROT 5.7* 5.4*   ALBUMIN 1.9* 1.9*   BILITOT 0.3 0.3   ALKPHOS 158* 150*   AST 36 35   ALT 62* 54*   ANIONGAP 10 11   EGFRNONAA >60 >60     Magnesium:   Recent Labs  Lab 12/30/17  0503 12/31/17  0601   MG 1.8 1.9       Significant Imaging: I have reviewed all pertinent imaging results/findings within the past 24 hours.

## 2018-01-01 NOTE — PROGRESS NOTES
[facility]  Pulmonology  Progress Note    Patient Name: Yen Falcon  MRN: [id number]  Admission Date: 12/26/2017  Hospital Length of Stay: 6 days  Code Status: Full Code  Attending Provider: Kristian Chiang MD  Primary Care Provider: Lauren Goodman MD   Principal Problem: Acute hypoxemic respiratory failure    Subjective     Interval history  No acute events.        Objective     Vitals:    01/01/18 1612   BP: (!) 128/92   Pulse: 107   Resp: 20   Temp: 97.5 °F (36.4 °C)       Physical Exam   Constitutional: She is oriented to person, place, and time. She is alert and comfortable.   HENT: Head: Normocephalic and atraumatic. Right pre-auricular node    Cardiovascular: Normal rate and regular rhythm.    Pulmonary/Chest: Decreased breath sounds on the left with rhonchi and wheezes. Wheezes and crackles in right base.   Abdominal: Soft. BS robust. Mild TTP over epigastrium. No rebound tenderness.   Musculoskeletal: Normal range of motion.   Neurological: She is alert and oriented to person, place, and time.   Skin: Skin is warm and dry.       Labs    Recent Labs  Lab 12/30/17  0503 12/31/17  0601 01/01/18  0427   WBC 22.24* 19.83* 23.07*   HGB 11.4* 10.8* 10.7*   HCT 36.1* 33.5* 34.3*    243 237         Recent Labs  Lab 12/30/17  0503 12/31/17  0601 01/01/18  0427   * 137 139   K 4.4 4.6 4.4    101 101   CO2 24 25 27   BUN 17 20 21*   CREATININE 0.6 0.6 0.6   CALCIUM 8.4* 8.3* 8.4*   PROT 5.7* 5.4* 5.5*   BILITOT 0.3 0.3 0.3   ALKPHOS 158* 150* 150*   ALT 62* 54* 49*   AST 36 35 34         Assessment / Recommendations                Adenocarcinoma of left lung, stage 4     · Left lung hilar mass, widely metastatic including brain, chest, abdomen  · Right axillary LN biopsied at [facility] on 12/18   · Axillary node showed adenocarcinoma  · Chemotherapy started per Dr. Quiros on 12/27, well tolerated thus far although it may be causing her malaise today       Bilateral  pneumonia     · Post-obstructive pneumonia from tumor burden  · Not toxic, finishing a moxi course       Acute hypoxemic respiratory failure     · Requiring CF/HFNC to maintain sats and comfort  · Wean supplemental O2 as tolerated  · Arrange for home O2       Pleural effusions, bilateral     · Secondary to malignancy  · thoracentesis 12/22/17 - 1L removed   · Thoracentesis repeated in ED on admit - 1.2 L removed  · PleurX catheter placed on left at bedside 12/27  · Has a right-sided effusion building up, may need a second PleurX in future; will go by symptoms and sats  · Pt and family will require training in PleurX management; they have agreed to a session in two days          Dr. Rose and I performed drainage of her PleurX and instructed Pt and family in doing so today on rounds. 220 cc serosanguinous fluid drained, no problems.     Pt is just about dischargeable provided that her O2 needs can be met at home.     Plan discussed by Dr. Rose, staff, and Dr. Chiang with Pt, mother, and aunt at bedside.     Bryan Dennis, PGY-5  Pulmonary & Critical Care Medicine  pager [phone]

## 2018-01-02 LAB
ALBUMIN SERPL BCP-MCNC: 2.2 G/DL
ALP SERPL-CCNC: 153 U/L
ALT SERPL W/O P-5'-P-CCNC: 44 U/L
ANION GAP SERPL CALC-SCNC: 10 MMOL/L
AST SERPL-CCNC: 33 U/L
BASOPHILS # BLD AUTO: 0.08 K/UL
BASOPHILS NFR BLD: 0.3 %
BILIRUB SERPL-MCNC: 0.4 MG/DL
BUN SERPL-MCNC: 21 MG/DL
CALCIUM SERPL-MCNC: 8.6 MG/DL
CHLORIDE SERPL-SCNC: 98 MMOL/L
CO2 SERPL-SCNC: 29 MMOL/L
CREAT SERPL-MCNC: 0.6 MG/DL
DIFFERENTIAL METHOD: ABNORMAL
EOSINOPHIL # BLD AUTO: 0.1 K/UL
EOSINOPHIL NFR BLD: 0.3 %
ERYTHROCYTE [DISTWIDTH] IN BLOOD BY AUTOMATED COUNT: 13 %
EST. GFR  (AFRICAN AMERICAN): >60 ML/MIN/1.73 M^2
EST. GFR  (NON AFRICAN AMERICAN): >60 ML/MIN/1.73 M^2
GLUCOSE SERPL-MCNC: 135 MG/DL
HCT VFR BLD AUTO: 34.9 %
HGB BLD-MCNC: 11 G/DL
LYMPHOCYTES # BLD AUTO: 1.3 K/UL
LYMPHOCYTES NFR BLD: 4.9 %
MAGNESIUM SERPL-MCNC: 2 MG/DL
MCH RBC QN AUTO: 28.6 PG
MCHC RBC AUTO-ENTMCNC: 31.5 G/DL
MCV RBC AUTO: 91 FL
MONOCYTES # BLD AUTO: 0.1 K/UL
MONOCYTES NFR BLD: 0.3 %
NEUTROPHILS # BLD AUTO: 23.6 K/UL
NEUTROPHILS NFR BLD: 94.2 %
PHOSPHATE SERPL-MCNC: 3.4 MG/DL
PLATELET # BLD AUTO: 214 K/UL
PMV BLD AUTO: 10.1 FL
POTASSIUM SERPL-SCNC: 4.3 MMOL/L
PROT SERPL-MCNC: 5.7 G/DL
RBC # BLD AUTO: 3.84 M/UL
SODIUM SERPL-SCNC: 137 MMOL/L
WBC # BLD AUTO: 26.33 K/UL

## 2018-01-02 PROCEDURE — 94640 AIRWAY INHALATION TREATMENT: CPT

## 2018-01-02 PROCEDURE — 25000003 PHARM REV CODE 250: Performed by: EMERGENCY MEDICINE

## 2018-01-02 PROCEDURE — 80053 COMPREHEN METABOLIC PANEL: CPT

## 2018-01-02 PROCEDURE — 25000242 PHARM REV CODE 250 ALT 637 W/ HCPCS: Performed by: HOSPITALIST

## 2018-01-02 PROCEDURE — 25000003 PHARM REV CODE 250: Performed by: HOSPITALIST

## 2018-01-02 PROCEDURE — 36415 COLL VENOUS BLD VENIPUNCTURE: CPT

## 2018-01-02 PROCEDURE — 27100092 HC HIGH FLOW DELIVERY CANNULA

## 2018-01-02 PROCEDURE — 94761 N-INVAS EAR/PLS OXIMETRY MLT: CPT

## 2018-01-02 PROCEDURE — 83735 ASSAY OF MAGNESIUM: CPT

## 2018-01-02 PROCEDURE — 27100171 HC OXYGEN HIGH FLOW UP TO 24 HOURS

## 2018-01-02 PROCEDURE — 99232 SBSQ HOSP IP/OBS MODERATE 35: CPT | Mod: ,,, | Performed by: INTERNAL MEDICINE

## 2018-01-02 PROCEDURE — 85025 COMPLETE CBC W/AUTO DIFF WBC: CPT

## 2018-01-02 PROCEDURE — A4216 STERILE WATER/SALINE, 10 ML: HCPCS | Performed by: EMERGENCY MEDICINE

## 2018-01-02 PROCEDURE — 84100 ASSAY OF PHOSPHORUS: CPT

## 2018-01-02 PROCEDURE — 11000001 HC ACUTE MED/SURG PRIVATE ROOM

## 2018-01-02 PROCEDURE — 63600175 PHARM REV CODE 636 W HCPCS: Performed by: INTERNAL MEDICINE

## 2018-01-02 PROCEDURE — 63600175 PHARM REV CODE 636 W HCPCS: Performed by: HOSPITALIST

## 2018-01-02 RX ORDER — MORPHINE SULFATE 15 MG/1
15 TABLET, FILM COATED, EXTENDED RELEASE ORAL EVERY 12 HOURS
Status: DISCONTINUED | OUTPATIENT
Start: 2018-01-02 | End: 2018-01-03

## 2018-01-02 RX ORDER — DEXAMETHASONE 4 MG/1
4 TABLET ORAL 2 TIMES DAILY
Status: DISCONTINUED | OUTPATIENT
Start: 2018-01-02 | End: 2018-01-05

## 2018-01-02 RX ADMIN — IPRATROPIUM BROMIDE AND ALBUTEROL SULFATE 3 ML: .5; 3 SOLUTION RESPIRATORY (INHALATION) at 01:01

## 2018-01-02 RX ADMIN — PANTOPRAZOLE SODIUM 40 MG: 40 TABLET, DELAYED RELEASE ORAL at 08:01

## 2018-01-02 RX ADMIN — HYDROMORPHONE HYDROCHLORIDE 1 MG: 2 INJECTION INTRAMUSCULAR; INTRAVENOUS; SUBCUTANEOUS at 12:01

## 2018-01-02 RX ADMIN — IPRATROPIUM BROMIDE AND ALBUTEROL SULFATE 3 ML: .5; 3 SOLUTION RESPIRATORY (INHALATION) at 12:01

## 2018-01-02 RX ADMIN — METOCLOPRAMIDE 10 MG: 10 TABLET ORAL at 06:01

## 2018-01-02 RX ADMIN — IPRATROPIUM BROMIDE AND ALBUTEROL SULFATE 3 ML: .5; 3 SOLUTION RESPIRATORY (INHALATION) at 07:01

## 2018-01-02 RX ADMIN — IPRATROPIUM BROMIDE AND ALBUTEROL SULFATE 3 ML: .5; 3 SOLUTION RESPIRATORY (INHALATION) at 11:01

## 2018-01-02 RX ADMIN — METOCLOPRAMIDE 10 MG: 10 TABLET ORAL at 11:01

## 2018-01-02 RX ADMIN — SODIUM CHLORIDE, PRESERVATIVE FREE 3 ML: 5 INJECTION INTRAVENOUS at 03:01

## 2018-01-02 RX ADMIN — DEXAMETHASONE 4 MG: 4 TABLET ORAL at 11:01

## 2018-01-02 RX ADMIN — HYDROMORPHONE HYDROCHLORIDE 1 MG: 2 INJECTION INTRAMUSCULAR; INTRAVENOUS; SUBCUTANEOUS at 11:01

## 2018-01-02 RX ADMIN — NYSTATIN 500000 UNITS: 500000 SUSPENSION ORAL at 05:01

## 2018-01-02 RX ADMIN — HYDROMORPHONE HYDROCHLORIDE 1 MG: 2 INJECTION INTRAMUSCULAR; INTRAVENOUS; SUBCUTANEOUS at 07:01

## 2018-01-02 RX ADMIN — DEXAMETHASONE 4 MG: 4 TABLET ORAL at 05:01

## 2018-01-02 RX ADMIN — IPRATROPIUM BROMIDE AND ALBUTEROL SULFATE 3 ML: .5; 3 SOLUTION RESPIRATORY (INHALATION) at 05:01

## 2018-01-02 RX ADMIN — MOXIFLOXACIN HYDROCHLORIDE 400 MG: 400 TABLET, FILM COATED ORAL at 08:01

## 2018-01-02 RX ADMIN — HYDROMORPHONE HYDROCHLORIDE 1 MG: 2 INJECTION INTRAMUSCULAR; INTRAVENOUS; SUBCUTANEOUS at 03:01

## 2018-01-02 RX ADMIN — DEXAMETHASONE 4 MG: 4 TABLET ORAL at 12:01

## 2018-01-02 RX ADMIN — MORPHINE SULFATE 15 MG: 15 TABLET, EXTENDED RELEASE ORAL at 08:01

## 2018-01-02 RX ADMIN — NYSTATIN 500000 UNITS: 500000 SUSPENSION ORAL at 12:01

## 2018-01-02 RX ADMIN — POLYETHYLENE GLYCOL 3350 17 G: 17 POWDER, FOR SOLUTION ORAL at 08:01

## 2018-01-02 RX ADMIN — DEXAMETHASONE 4 MG: 4 TABLET ORAL at 08:01

## 2018-01-02 RX ADMIN — SODIUM CHLORIDE, PRESERVATIVE FREE 3 ML: 5 INJECTION INTRAVENOUS at 12:01

## 2018-01-02 RX ADMIN — SODIUM CHLORIDE, PRESERVATIVE FREE 3 ML: 5 INJECTION INTRAVENOUS at 07:01

## 2018-01-02 RX ADMIN — ENOXAPARIN SODIUM 40 MG: 100 INJECTION SUBCUTANEOUS at 05:01

## 2018-01-02 RX ADMIN — BUPROPION HYDROCHLORIDE 300 MG: 150 TABLET, FILM COATED, EXTENDED RELEASE ORAL at 08:01

## 2018-01-02 RX ADMIN — NYSTATIN 500000 UNITS: 500000 SUSPENSION ORAL at 11:01

## 2018-01-02 RX ADMIN — METOCLOPRAMIDE 10 MG: 10 TABLET ORAL at 03:01

## 2018-01-02 NOTE — PROGRESS NOTES
[facility] Hospital Medicine  Progress Note    Patient Name: Yen Falcon  MRN: [id number]  Patient Class: IP- Inpatient   Admission Date: 12/26/2017  Length of Stay: 6 days  Attending Physician: Kristian Chiang MD  Primary Care Provider: Lauren Goodman MD        Subjective:     Principal Problem:Acute hypoxemic respiratory failure    HPI:  Yen Falcon is  42 y.o.  woman (LMP: 11/26/17) with a history of recurrent upper respiratory infection, asthma diagnosed in 2013, smoking that she quit when she was diagnosed with asthma, a left malignant pleural effusion since 10/30/17 due to stage 4 left lung carcinoma.  She is a former smoker, but quit when she was diagnosed with asthma.  She lives in [place], [place].   She was found to have a left pleural effusion at [facility] on 10/30/17 that was subsequently found to be due to metastatic lung cancer after a biopsy of a right axillary lymph node metastasis on 12/18/17.  She was hospitalized at [facility] from 12/22/17 to 12/23/17 after being transferred from [facility] for hypoxia due to the effusion.  Interventional Radiology performed thoracentesis removing 1.25 liters.  She was seen by Pulmonology (Dr. Cheko Batista and Dr. Branden Manzano) who recommended prophylactic enoxaparin.  She was seen by hematologist Dr. Vinnie Quiros to establish care.  She was prescribed home oxygen with expectation for the effusion to reaccumulate.   She returned to [facility] the day after [place] with worsening shortness of breath confirmed to be due to effusion reaccumulation on chest x-ray.   Dr. Foote and Dr. Manzano drained another 1.1 liter and planned inpatient Pleur-X catheter placement, which would not be able to be done for another 2 days due to lack of staff.  She was readmitted to [facility] for this.    Hospital  Course:  She reported constipation since her last hospitalization so was given bisacodyl and polyethylene glycol.  Dr. Batista suspected she had adenocarcinoma and needed platinum based therapy.  Dr. Manzano obtained the biopsy results from [place], which showed that it was poorly differentiated adenocarcinoma.  They placed a PleurX catheter at bedside in the ICU.  After discussion with Dr. Quiros, it was decided she needed inpatient chemotherapy due to rapid progression.  She was started on carboplatin and etoposide.  Brain MRI and CT of the chest/abdomen/pelvis showed extensive metastases including in the brain, with vasogenic edema.  Dr. Quiros started steroids.  She required high flow nasal cannula.  She was transferred out of the ICU on 12/29/17.  She continued to complain of a sensation of food building up in her upper abdomen.  Pulmonology felt her pancreatic metastasis may be involved, and recommended Gastroenterology evaluation.    Interval History: Continuing to have liquid BMs today. Feeling better. Had some pleuritic chest pain on the right earlier.     Review of Systems   Constitutional: Negative for chills and fever.   Respiratory: Positive for cough. Negative for shortness of breath.    Cardiovascular: Negative for chest pain and palpitations.   Gastrointestinal: Negative for nausea and vomiting.     Objective:     Vital Signs (Most Recent):  Temp: 97.9 °F (36.6 °C) (01/01/18 1205)  Pulse: 63 (01/01/18 1220)  Resp: 18 (01/01/18 1220)  BP: 129/84 (01/01/18 1205)  SpO2: (!) 94 % (01/01/18 1220) Vital Signs (24h Range):  Temp:  [97.5 °F (36.4 °C)-98.7 °F (37.1 °C)] 97.9 °F (36.6 °C)  Pulse:  [] 63  Resp:  [16-20] 18  SpO2:  [92 %-95 %] 94 %  BP: (129-145)/(70-86) 129/84     Weight: 46.4 kg (102 lb 4.7 oz)  Body mass index is 18.12 kg/m².    Intake/Output Summary (Last 24 hours) at 01/01/18 1248  Last data filed at 01/01/18 0800   Gross per 24 hour   Intake              200 ml   Output               550 ml   Net             -350 ml      Physical Exam   Constitutional: She is oriented to person, place, and time. She appears well-developed. No distress.   Cardiovascular: Normal rate and regular rhythm.    No murmur heard.  Pulmonary/Chest: Effort normal. No respiratory distress. She has decreased breath sounds. She has no wheezes.   Abdominal: Soft. She exhibits no distension. Bowel sounds are decreased. There is tenderness in the epigastric area.   Musculoskeletal: She exhibits no edema.   Neurological: She is alert and oriented to person, place, and time.   Skin: Skin is warm and dry.   Psychiatric: She has a normal mood and affect. Her behavior is normal.   Nursing note and vitals reviewed.      Significant Labs:   CBC:   Recent Labs  Lab 12/31/17 0601 01/01/18  0427   WBC 19.83* 23.07*   HGB 10.8* 10.7*   HCT 33.5* 34.3*    237     CMP:   Recent Labs  Lab 12/31/17 0601 01/01/18  0427    139   K 4.6 4.4    101   CO2 25 27   * 141*   BUN 20 21*   CREATININE 0.6 0.6   CALCIUM 8.3* 8.4*   PROT 5.4* 5.5*   ALBUMIN 1.9* 2.0*   BILITOT 0.3 0.3   ALKPHOS 150* 150*   AST 35 34   ALT 54* 49*   ANIONGAP 11 11   EGFRNONAA >60 >60     Magnesium:   Recent Labs  Lab 12/31/17 0601 01/01/18  0427   MG 1.9 1.8       Significant Imaging: I have reviewed all pertinent imaging results/findings within the past 24 hours.    Assessment/Plan:      * Acute hypoxemic respiratory failure    Multifactorial. Continue to wean from the comfort flow as able back to low flow NC.           Constipation    Continue polyethylene glycol. Hold lactulose as had loose stools today. Continue to monitor.         Bilateral pneumonia    Continue moxifloxacin started last admission.          Anxiety    Continue bupropion and lorazepam PRN.          Pleural effusion on left    Admission for chest tube placement  Acute hypoxemic respiratory failure  Malignant neoplasm of left lung stage 4  Malignant neoplasm metastatic  to lymph node of axilla  Asthma  Continue supplemental oxygen.  Wean down to low flow nasal cannula as able.  Give scheduled nebulizer treatments due to asthma history and wheeze on exam.  Continue prophylactic enoxaparin.  Pulmonology placed Pleur-X catheter.  Started chemotherapy.  Steroids started for brain metastases, and pantoprazole started due to abdominal complaints.  Give vanilla Boost supplements.  Next chemotherapy 3 weeks from 12/27/17.          VTE Risk Mitigation         Ordered     heparin, porcine (PF) 100 unit/mL injection flush 500 Units  As needed (PRN)     Route:  Intravenous        12/27/17 1210     enoxaparin injection 40 mg  Daily     Route:  Subcutaneous        12/26/17 2223     Medium Risk of VTE  Once      12/26/17 2223              Kristian Chiang MD  Department of Hospital Medicine   [facility]

## 2018-01-02 NOTE — PLAN OF CARE
Problem: Patient Care Overview  Goal: Plan of Care Review  Outcome: Ongoing (interventions implemented as appropriate)  Administer nebulizer treatments as ordered and encourage deep breathing / cough.  Pt currently on comfort flow oxygen, 30% at 10 l/min.  Continued monitoring of patient's oxygenation status.

## 2018-01-02 NOTE — PLAN OF CARE
Problem: Breathing Pattern Ineffective (Adult)  Goal: Identify Related Risk Factors and Signs and Symptoms  Related risk factors and signs and symptoms are identified upon initiation of Human Response Clinical Practice Guideline (CPG)   Outcome: Revised  Patient is awake, alert, and oriented.  Patient has complaints of pain and pain medications given as per requests.  Patient is sitting up in bed with no complaints of shortness of breath.  O2 is at 6L humidified per nasal cannula.  Mother is at bedside.  Will continue to monitor.

## 2018-01-02 NOTE — PT/OT/SLP PROGRESS
Physical Therapy      Patient Name:  Yen Falcon   MRN:  [id number]    Patient on Hi flow O2 reports walking back and forth to BR without  difficulty only SOB resolves with rest sitting EOB. Refused treatment  Will check in pm     Kp Garza, PT

## 2018-01-02 NOTE — PROGRESS NOTES
[facility]  Hematology/Oncology  Progress Note    Patient Name: Yen Flacon  Admission Date: 12/26/2017  Hospital Length of Stay: 7 days  Code Status: Full Code     Subjective:     HPI:  43 yo female recently diagnosed with Stage 4 lung carcinoma, underwent left thoracentesis for symptomatic malignant pleural effusion on 12/22/17 - now admitted with worsening dyspnea, has rapid accumulation of pleural fluid and new effusion on right.    She is in the ICU.    Biopsy report of right axillary LN/subcutaneous nodule done 12/18/17 showed poorly differentiated carcinoma compatible with primary lung adenocarcinoma.    Interval History: 220 cc serosanguinous fluid drained from PleurX catheter 1/1/18    Oncology Treatment Plan:   CARBOPLATIN (AUC) + ETOPOSIDE    Medications:  Continuous Infusions:  Scheduled Meds:   albuterol-ipratropium 2.5mg-0.5mg/3mL  3 mL Nebulization Q6H WAKE    buPROPion  300 mg Oral Daily    dexamethasone  4 mg Oral QID    enoxaparin  40 mg Subcutaneous Daily    metoclopramide HCl  10 mg Oral TID AC    moxifloxacin  400 mg Oral Daily    nystatin  500,000 Units Oral QID    pantoprazole  40 mg Oral Daily    polyethylene glycol  17 g Oral BID    sodium chloride 0.9%  3 mL Intravenous Q8H     PRN Meds:acetaminophen, albuterol-ipratropium 2.5mg-0.5mg/3mL, alteplase, aluminum-magnesium hydroxide-simethicone, bisacodyl, diphenhydrAMINE, heparin, porcine (PF), hydrocodone-acetaminophen 7.5-325mg, HYDROmorphone, lactulose, LORazepam, ondansetron, sodium chloride 0.9%, sodium chloride 0.9%, sodium chloride 0.9%     Review of Systems   Constitutional: Negative for fever and unexpected weight change.   HENT: Negative for mouth sores and nosebleeds.    Respiratory: Positive for shortness of breath. Negative for wheezing.    Cardiovascular: Negative for chest pain and palpitations.   Gastrointestinal: Negative for abdominal pain and vomiting.   Psychiatric/Behavioral: Negative for  behavioral problems and confusion.     Objective:     Vital Signs (Most Recent):  Temp: 97.7 °F (36.5 °C) (01/02/18 1204)  Pulse: 102 (01/02/18 1225)  Resp: 20 (01/02/18 1204)  BP: 120/78 (01/02/18 1225)  SpO2: 98 % (01/02/18 1117) Vital Signs (24h Range):  Temp:  [97.5 °F (36.4 °C)-98 °F (36.7 °C)] 97.7 °F (36.5 °C)  Pulse:  [] 102  Resp:  [16-22] 20  SpO2:  [92 %-98 %] 98 %  BP: (120-179)/(63-92) 120/78     Weight: 46.4 kg (102 lb 4.7 oz)  Body mass index is 18.12 kg/m².  Body surface area is 1.44 meters squared.      Intake/Output Summary (Last 24 hours) at 01/02/18 1228  Last data filed at 01/02/18 0800   Gross per 24 hour   Intake              338 ml   Output              500 ml   Net             -162 ml       Physical Exam   HENT:   Mouth/Throat: No oropharyngeal exudate.   Neck: Neck supple. No thyromegaly present.   Cardiovascular: Normal rate and regular rhythm.    Pulmonary/Chest: Effort normal. No respiratory distress. She has decreased breath sounds. She has no wheezes. She has rales.   Lymphadenopathy:     She has no cervical adenopathy.   Skin: No bruising and no petechiae noted.   Psychiatric: Cognition and memory are normal.   Vitals reviewed.      Significant Labs:   All pertinent labs from the last 24 hours have been reviewed.    Diagnostic Results:  I have reviewed all pertinent imaging results/findings within the past 24 hours.    Assessment/Plan:     Malignant neoplasm of left lung stage 4    43 yo female with aggressive Stage 4 poorly differentiated lung adenocarcinoma with rapidly progressive disease.    Decrease Dexamethasone to 4 mg bid for asymptomatic brain mets with edema    Refer to out-pt RadOnc after completion of chemotherapy.    S/p first cycle of chemotherapy - 12/27/17. Next cycle due 1/17.                      Vinnie Quiros MD  Hematology/Oncology  [facility]

## 2018-01-02 NOTE — PLAN OF CARE
"Problem: Patient Care Overview  Goal: Plan of Care Review  Patient AAOx4. No distress noted. patient ambulatory. On 20 LPM high flow o2. Saturations between 92-96%. Last bm on 1/1. Voiding without difficulty. No family stayed during night. abd rounded and distended. Hypoactive BS. On full liquid diet. Wheezing to Left upper anterior chest, lung sounds diminished to left chest. Breath sounds clear on right. pleur ex drain to left chest. Clamped. Dressing c/d/i. PRN breathing treatment given overnight per patient feeling SOB, and "feeling different" o2 saturations were 96 during this episode. Safety ensured. Call light within reach. Bed alarm on. Will continue to monitor      "

## 2018-01-02 NOTE — SUBJECTIVE & OBJECTIVE
Interval History: 220 cc serosanguinous fluid drained from PleurX catheter 1/1/18    Oncology Treatment Plan:   CARBOPLATIN (AUC) + ETOPOSIDE    Medications:  Continuous Infusions:  Scheduled Meds:   albuterol-ipratropium 2.5mg-0.5mg/3mL  3 mL Nebulization Q6H WAKE    buPROPion  300 mg Oral Daily    dexamethasone  4 mg Oral QID    enoxaparin  40 mg Subcutaneous Daily    metoclopramide HCl  10 mg Oral TID AC    moxifloxacin  400 mg Oral Daily    nystatin  500,000 Units Oral QID    pantoprazole  40 mg Oral Daily    polyethylene glycol  17 g Oral BID    sodium chloride 0.9%  3 mL Intravenous Q8H     PRN Meds:acetaminophen, albuterol-ipratropium 2.5mg-0.5mg/3mL, alteplase, aluminum-magnesium hydroxide-simethicone, bisacodyl, diphenhydrAMINE, heparin, porcine (PF), hydrocodone-acetaminophen 7.5-325mg, HYDROmorphone, lactulose, LORazepam, ondansetron, sodium chloride 0.9%, sodium chloride 0.9%, sodium chloride 0.9%     Review of Systems   Constitutional: Negative for fever and unexpected weight change.   HENT: Negative for mouth sores and nosebleeds.    Respiratory: Positive for shortness of breath. Negative for wheezing.    Cardiovascular: Negative for chest pain and palpitations.   Gastrointestinal: Negative for abdominal pain and vomiting.   Psychiatric/Behavioral: Negative for behavioral problems and confusion.     Objective:     Vital Signs (Most Recent):  Temp: 97.7 °F (36.5 °C) (01/02/18 1204)  Pulse: 102 (01/02/18 1225)  Resp: 20 (01/02/18 1204)  BP: 120/78 (01/02/18 1225)  SpO2: 98 % (01/02/18 1117) Vital Signs (24h Range):  Temp:  [97.5 °F (36.4 °C)-98 °F (36.7 °C)] 97.7 °F (36.5 °C)  Pulse:  [] 102  Resp:  [16-22] 20  SpO2:  [92 %-98 %] 98 %  BP: (120-179)/(63-92) 120/78     Weight: 46.4 kg (102 lb 4.7 oz)  Body mass index is 18.12 kg/m².  Body surface area is 1.44 meters squared.      Intake/Output Summary (Last 24 hours) at 01/02/18 1228  Last data filed at 01/02/18 0800   Gross per 24 hour    Intake              338 ml   Output              500 ml   Net             -162 ml       Physical Exam   HENT:   Mouth/Throat: No oropharyngeal exudate.   Neck: Neck supple. No thyromegaly present.   Cardiovascular: Normal rate and regular rhythm.    Pulmonary/Chest: Effort normal. No respiratory distress. She has decreased breath sounds. She has no wheezes. She has rales.   Lymphadenopathy:     She has no cervical adenopathy.   Skin: No bruising and no petechiae noted.   Psychiatric: Cognition and memory are normal.   Vitals reviewed.      Significant Labs:   All pertinent labs from the last 24 hours have been reviewed.    Diagnostic Results:  I have reviewed all pertinent imaging results/findings within the past 24 hours.

## 2018-01-02 NOTE — ASSESSMENT & PLAN NOTE
41 yo female with aggressive Stage 4 poorly differentiated lung adenocarcinoma with rapidly progressive disease.    Decrease Dexamethasone to 4 mg bid for asymptomatic brain mets with edema    Refer to out-pt RadOnc after completion of chemotherapy.    S/p first cycle of chemotherapy - 12/27/17. Next cycle due 1/17.

## 2018-01-03 LAB
ALBUMIN SERPL BCP-MCNC: 2.1 G/DL
ALP SERPL-CCNC: 142 U/L
ALT SERPL W/O P-5'-P-CCNC: 38 U/L
ANION GAP SERPL CALC-SCNC: 9 MMOL/L
AST SERPL-CCNC: 30 U/L
BASOPHILS # BLD AUTO: 0.03 K/UL
BASOPHILS NFR BLD: 0.1 %
BILIRUB SERPL-MCNC: 0.5 MG/DL
BUN SERPL-MCNC: 22 MG/DL
CALCIUM SERPL-MCNC: 8.5 MG/DL
CHLORIDE SERPL-SCNC: 99 MMOL/L
CO2 SERPL-SCNC: 28 MMOL/L
CREAT SERPL-MCNC: 0.6 MG/DL
DIFFERENTIAL METHOD: ABNORMAL
EOSINOPHIL # BLD AUTO: 0.2 K/UL
EOSINOPHIL NFR BLD: 0.7 %
ERYTHROCYTE [DISTWIDTH] IN BLOOD BY AUTOMATED COUNT: 12.9 %
EST. GFR  (AFRICAN AMERICAN): >60 ML/MIN/1.73 M^2
EST. GFR  (NON AFRICAN AMERICAN): >60 ML/MIN/1.73 M^2
GLUCOSE SERPL-MCNC: 122 MG/DL
HCT VFR BLD AUTO: 31.5 %
HGB BLD-MCNC: 9.9 G/DL
LYMPHOCYTES # BLD AUTO: 1.7 K/UL
LYMPHOCYTES NFR BLD: 8.4 %
MAGNESIUM SERPL-MCNC: 1.7 MG/DL
MCH RBC QN AUTO: 28.3 PG
MCHC RBC AUTO-ENTMCNC: 31.4 G/DL
MCV RBC AUTO: 90 FL
MONOCYTES # BLD AUTO: 0.1 K/UL
MONOCYTES NFR BLD: 0.7 %
NEUTROPHILS # BLD AUTO: 17.6 K/UL
NEUTROPHILS NFR BLD: 86.6 %
PHOSPHATE SERPL-MCNC: 3.5 MG/DL
PLATELET # BLD AUTO: 154 K/UL
PMV BLD AUTO: 10.2 FL
POTASSIUM SERPL-SCNC: 4.4 MMOL/L
PROT SERPL-MCNC: 5.5 G/DL
RBC # BLD AUTO: 3.5 M/UL
SODIUM SERPL-SCNC: 136 MMOL/L
WBC # BLD AUTO: 20.36 K/UL

## 2018-01-03 PROCEDURE — 63600175 PHARM REV CODE 636 W HCPCS: Performed by: HOSPITALIST

## 2018-01-03 PROCEDURE — 25000003 PHARM REV CODE 250: Performed by: HOSPITALIST

## 2018-01-03 PROCEDURE — 83735 ASSAY OF MAGNESIUM: CPT

## 2018-01-03 PROCEDURE — 25000242 PHARM REV CODE 250 ALT 637 W/ HCPCS: Performed by: HOSPITALIST

## 2018-01-03 PROCEDURE — 80053 COMPREHEN METABOLIC PANEL: CPT

## 2018-01-03 PROCEDURE — 27100171 HC OXYGEN HIGH FLOW UP TO 24 HOURS

## 2018-01-03 PROCEDURE — 63600175 PHARM REV CODE 636 W HCPCS: Performed by: INTERNAL MEDICINE

## 2018-01-03 PROCEDURE — 94761 N-INVAS EAR/PLS OXIMETRY MLT: CPT

## 2018-01-03 PROCEDURE — 84100 ASSAY OF PHOSPHORUS: CPT

## 2018-01-03 PROCEDURE — 85025 COMPLETE CBC W/AUTO DIFF WBC: CPT

## 2018-01-03 PROCEDURE — 11000001 HC ACUTE MED/SURG PRIVATE ROOM

## 2018-01-03 PROCEDURE — 99233 SBSQ HOSP IP/OBS HIGH 50: CPT | Mod: ,,, | Performed by: INTERNAL MEDICINE

## 2018-01-03 PROCEDURE — 94640 AIRWAY INHALATION TREATMENT: CPT

## 2018-01-03 PROCEDURE — S0179 MEGESTROL 20 MG: HCPCS | Performed by: HOSPITALIST

## 2018-01-03 PROCEDURE — 36415 COLL VENOUS BLD VENIPUNCTURE: CPT

## 2018-01-03 RX ORDER — MEGESTROL ACETATE 40 MG/ML
800 SUSPENSION ORAL DAILY
Status: DISCONTINUED | OUTPATIENT
Start: 2018-01-03 | End: 2018-01-03

## 2018-01-03 RX ORDER — MEGESTROL ACETATE 40 MG/ML
400 SUSPENSION ORAL 2 TIMES DAILY
Status: DISCONTINUED | OUTPATIENT
Start: 2018-01-04 | End: 2018-01-08 | Stop reason: HOSPADM

## 2018-01-03 RX ORDER — MORPHINE SULFATE 30 MG/1
30 TABLET, FILM COATED, EXTENDED RELEASE ORAL EVERY 12 HOURS
Status: DISCONTINUED | OUTPATIENT
Start: 2018-01-03 | End: 2018-01-08

## 2018-01-03 RX ADMIN — HYDROMORPHONE HYDROCHLORIDE 1 MG: 2 INJECTION INTRAMUSCULAR; INTRAVENOUS; SUBCUTANEOUS at 09:01

## 2018-01-03 RX ADMIN — METOCLOPRAMIDE 10 MG: 10 TABLET ORAL at 05:01

## 2018-01-03 RX ADMIN — ENOXAPARIN SODIUM 40 MG: 100 INJECTION SUBCUTANEOUS at 05:01

## 2018-01-03 RX ADMIN — MEGESTROL ACETATE 800 MG: 40 SUSPENSION ORAL at 05:01

## 2018-01-03 RX ADMIN — POLYETHYLENE GLYCOL 3350 17 G: 17 POWDER, FOR SOLUTION ORAL at 09:01

## 2018-01-03 RX ADMIN — IPRATROPIUM BROMIDE AND ALBUTEROL SULFATE 3 ML: .5; 3 SOLUTION RESPIRATORY (INHALATION) at 01:01

## 2018-01-03 RX ADMIN — HYDROMORPHONE HYDROCHLORIDE 1 MG: 2 INJECTION INTRAMUSCULAR; INTRAVENOUS; SUBCUTANEOUS at 03:01

## 2018-01-03 RX ADMIN — IPRATROPIUM BROMIDE AND ALBUTEROL SULFATE 3 ML: .5; 3 SOLUTION RESPIRATORY (INHALATION) at 04:01

## 2018-01-03 RX ADMIN — POLYETHYLENE GLYCOL 3350 17 G: 17 POWDER, FOR SOLUTION ORAL at 08:01

## 2018-01-03 RX ADMIN — MORPHINE SULFATE 30 MG: 30 TABLET, EXTENDED RELEASE ORAL at 11:01

## 2018-01-03 RX ADMIN — DEXAMETHASONE 4 MG: 4 TABLET ORAL at 08:01

## 2018-01-03 RX ADMIN — HYDROMORPHONE HYDROCHLORIDE 1 MG: 2 INJECTION INTRAMUSCULAR; INTRAVENOUS; SUBCUTANEOUS at 01:01

## 2018-01-03 RX ADMIN — MOXIFLOXACIN HYDROCHLORIDE 400 MG: 400 TABLET, FILM COATED ORAL at 09:01

## 2018-01-03 RX ADMIN — IPRATROPIUM BROMIDE AND ALBUTEROL SULFATE 3 ML: .5; 3 SOLUTION RESPIRATORY (INHALATION) at 08:01

## 2018-01-03 RX ADMIN — METOCLOPRAMIDE 10 MG: 10 TABLET ORAL at 11:01

## 2018-01-03 RX ADMIN — PANTOPRAZOLE SODIUM 40 MG: 40 TABLET, DELAYED RELEASE ORAL at 09:01

## 2018-01-03 RX ADMIN — BUPROPION HYDROCHLORIDE 300 MG: 150 TABLET, FILM COATED, EXTENDED RELEASE ORAL at 09:01

## 2018-01-03 RX ADMIN — HYDROMORPHONE HYDROCHLORIDE 1 MG: 2 INJECTION INTRAMUSCULAR; INTRAVENOUS; SUBCUTANEOUS at 10:01

## 2018-01-03 RX ADMIN — HYDROMORPHONE HYDROCHLORIDE 1 MG: 2 INJECTION INTRAMUSCULAR; INTRAVENOUS; SUBCUTANEOUS at 05:01

## 2018-01-03 RX ADMIN — DEXAMETHASONE 4 MG: 4 TABLET ORAL at 09:01

## 2018-01-03 RX ADMIN — IPRATROPIUM BROMIDE AND ALBUTEROL SULFATE 3 ML: .5; 3 SOLUTION RESPIRATORY (INHALATION) at 10:01

## 2018-01-03 RX ADMIN — IPRATROPIUM BROMIDE AND ALBUTEROL SULFATE 3 ML: .5; 3 SOLUTION RESPIRATORY (INHALATION) at 07:01

## 2018-01-03 RX ADMIN — MORPHINE SULFATE 30 MG: 30 TABLET, EXTENDED RELEASE ORAL at 08:01

## 2018-01-03 NOTE — SUBJECTIVE & OBJECTIVE
Interval History: Still with some abdominal pain, but a little better. Feels a little nausea, but no emesis. No BM today.     Review of Systems   Constitutional: Negative for chills and fever.   Respiratory: Negative for cough and shortness of breath.    Cardiovascular: Negative for chest pain and palpitations.   Gastrointestinal: Positive for nausea. Negative for vomiting.     Objective:     Vital Signs (Most Recent):  Temp: 97.6 °F (36.4 °C) (01/03/18 1144)  Pulse: (!) 116 (01/03/18 1312)  Resp: (!) 24 (01/03/18 1312)  BP: (!) 141/76 (01/03/18 1144)  SpO2: 97 % (01/03/18 1312) Vital Signs (24h Range):  Temp:  [97.6 °F (36.4 °C)-98.2 °F (36.8 °C)] 97.6 °F (36.4 °C)  Pulse:  [] 116  Resp:  [18-26] 24  SpO2:  [96 %-99 %] 97 %  BP: (129-141)/(70-83) 141/76     Weight: 46.4 kg (102 lb 4.7 oz)  Body mass index is 18.12 kg/m².    Intake/Output Summary (Last 24 hours) at 01/03/18 1418  Last data filed at 01/03/18 1000   Gross per 24 hour   Intake              300 ml   Output              700 ml   Net             -400 ml      Physical Exam   Constitutional: She is oriented to person, place, and time. She appears well-developed. No distress.   Cardiovascular: Normal rate and regular rhythm.    No murmur heard.  Pulmonary/Chest: Effort normal. No respiratory distress. She has decreased breath sounds. She has no wheezes.   Abdominal: Soft. She exhibits no distension. Bowel sounds are decreased. There is tenderness in the epigastric area.   Musculoskeletal: She exhibits no edema.   Neurological: She is alert and oriented to person, place, and time.   Skin: Skin is warm and dry.   Psychiatric: She has a normal mood and affect. Her behavior is normal.   Nursing note and vitals reviewed.      Significant Labs:   CBC:   Recent Labs  Lab 01/02/18  0535 01/03/18  0741   WBC 26.33* 20.36*   HGB 11.0* 9.9*   HCT 34.9* 31.5*    154     CMP:   Recent Labs  Lab 01/02/18  0535 01/03/18  0741    136   K 4.3 4.4   CL 98  99   CO2 29 28   * 122*   BUN 21* 22*   CREATININE 0.6 0.6   CALCIUM 8.6* 8.5*   PROT 5.7* 5.5*   ALBUMIN 2.2* 2.1*   BILITOT 0.4 0.5   ALKPHOS 153* 142*   AST 33 30   ALT 44 38   ANIONGAP 10 9   EGFRNONAA >60 >60     Magnesium:   Recent Labs  Lab 01/02/18  0535 01/03/18  0741   MG 2.0 1.7       Significant Imaging: I have reviewed all pertinent imaging results/findings within the past 24 hours.

## 2018-01-03 NOTE — PLAN OF CARE
Problem: Patient Care Overview  Goal: Plan of Care Review  Outcome: Ongoing (interventions implemented as appropriate)  The proper method of use, as well as anticipated side effects, of this aerosol treatment are discussed and demonstrated to the patient. O2 saturation 98% on nasal cannula 6 lpm. Will continue to monitor.

## 2018-01-03 NOTE — ASSESSMENT & PLAN NOTE
Admission for chest tube placement  Acute hypoxemic respiratory failure  Malignant neoplasm of left lung stage 4  Malignant neoplasm metastatic to lymph node of axilla  Asthma  Brain metastases with cerebral edema  Continue supplemental oxygen. Switch from comfort flow to HFNC. Continue scheduled nebulizer treatments.  Continue prophylactic enoxaparin.  Pulmonology placed Pleur-X catheter.  Started chemotherapy and will receive next round on 1/17/18.  Steroids started for brain metastases, and pantoprazole started due to abdominal complaints.  Give vanilla Boost supplements. Decrease dexamethasone to BID.

## 2018-01-03 NOTE — SUBJECTIVE & OBJECTIVE
Interval History: Says that she is feeling better today. Still with a little cough and MAIER when walking to the bathroom. Having BMs still.     Review of Systems   Constitutional: Negative for chills and fever.   Respiratory: Negative for cough and shortness of breath.    Cardiovascular: Negative for chest pain and palpitations.   Gastrointestinal: Negative for nausea and vomiting.     Objective:     Vital Signs (Most Recent):  Temp: 97.8 °F (36.6 °C) (01/02/18 1736)  Pulse: 110 (01/02/18 1736)  Resp: 20 (01/02/18 1736)  BP: 132/70 (01/02/18 1736)  SpO2: 99 % (01/02/18 1546) Vital Signs (24h Range):  Temp:  [97.7 °F (36.5 °C)-98 °F (36.7 °C)] 97.8 °F (36.6 °C)  Pulse:  [] 110  Resp:  [16-22] 20  SpO2:  [92 %-99 %] 99 %  BP: (120-179)/(63-85) 132/70     Weight: 46.4 kg (102 lb 4.7 oz)  Body mass index is 18.12 kg/m².    Intake/Output Summary (Last 24 hours) at 01/02/18 1915  Last data filed at 01/02/18 0800   Gross per 24 hour   Intake              338 ml   Output              300 ml   Net               38 ml      Physical Exam   Constitutional: She is oriented to person, place, and time. She appears well-developed. No distress.   Cardiovascular: Normal rate and regular rhythm.    No murmur heard.  Pulmonary/Chest: Effort normal. No respiratory distress. She has decreased breath sounds. She has no wheezes.   Abdominal: Soft. She exhibits no distension. Bowel sounds are decreased. There is tenderness in the epigastric area.   Musculoskeletal: She exhibits no edema.   Neurological: She is alert and oriented to person, place, and time.   Skin: Skin is warm and dry.   Psychiatric: She has a normal mood and affect. Her behavior is normal.   Nursing note and vitals reviewed.      Significant Labs:   CBC:   Recent Labs  Lab 01/01/18  0427 01/02/18  0535   WBC 23.07* 26.33*   HGB 10.7* 11.0*   HCT 34.3* 34.9*    214     CMP:   Recent Labs  Lab 01/01/18  0427 01/02/18  0535    137   K 4.4 4.3    98    CO2 27 29   * 135*   BUN 21* 21*   CREATININE 0.6 0.6   CALCIUM 8.4* 8.6*   PROT 5.5* 5.7*   ALBUMIN 2.0* 2.2*   BILITOT 0.3 0.4   ALKPHOS 150* 153*   AST 34 33   ALT 49* 44   ANIONGAP 11 10   EGFRNONAA >60 >60     Magnesium:   Recent Labs  Lab 01/01/18  0427 01/02/18  0535   MG 1.8 2.0       Significant Imaging: I have reviewed all pertinent imaging results/findings within the past 24 hours.

## 2018-01-03 NOTE — PROGRESS NOTES
[facility]  Hematology/Oncology  Progress Note    Patient Name: Yen Falcon  Admission Date: 12/26/2017  Hospital Length of Stay: 8 days  Code Status: Full Code     Subjective:     HPI:  41 yo female recently diagnosed with Stage 4 lung carcinoma, underwent left thoracentesis for symptomatic malignant pleural effusion on 12/22/17 - now admitted with worsening dyspnea, has rapid accumulation of pleural fluid and new effusion on right.    She is in the ICU.    Biopsy report of right axillary LN/subcutaneous nodule done 12/18/17 showed poorly differentiated carcinoma compatible with primary lung adenocarcinoma.    Interval History: started on morphine and megace     Oncology Treatment Plan:   CARBOPLATIN (AUC) + ETOPOSIDE    Medications:  Continuous Infusions:  Scheduled Meds:   albuterol-ipratropium 2.5mg-0.5mg/3mL  3 mL Nebulization Q6H WAKE    buPROPion  300 mg Oral Daily    dexamethasone  4 mg Oral BID    enoxaparin  40 mg Subcutaneous Daily    [START ON 1/4/2018] megestrol  400 mg Oral BID    metoclopramide HCl  10 mg Oral TID AC    morphine  30 mg Oral Q12H    pantoprazole  40 mg Oral Daily    polyethylene glycol  17 g Oral BID    sodium chloride 0.9%  3 mL Intravenous Q8H     PRN Meds:acetaminophen, albuterol-ipratropium 2.5mg-0.5mg/3mL, alteplase, aluminum-magnesium hydroxide-simethicone, bisacodyl, diphenhydrAMINE, heparin, porcine (PF), hydrocodone-acetaminophen 7.5-325mg, HYDROmorphone, lactulose, LORazepam, ondansetron, sodium chloride 0.9%, sodium chloride 0.9%, sodium chloride 0.9%     Review of Systems   Constitutional: Negative for fever and unexpected weight change.   HENT: Negative for mouth sores and nosebleeds.    Respiratory: Positive for shortness of breath. Negative for wheezing.    Cardiovascular: Negative for chest pain and palpitations.   Gastrointestinal: Negative for abdominal pain and vomiting.   Psychiatric/Behavioral: Negative for behavioral problems and  confusion.     Objective:     Vital Signs (Most Recent):  Temp: 97.9 °F (36.6 °C) (01/03/18 1525)  Pulse: (!) 113 (01/03/18 1525)  Resp: 18 (01/03/18 1525)  BP: 131/73 (01/03/18 1525)  SpO2: 98 % (01/03/18 1527) Vital Signs (24h Range):  Temp:  [97.6 °F (36.4 °C)-98.2 °F (36.8 °C)] 97.9 °F (36.6 °C)  Pulse:  [102-116] 113  Resp:  [18-26] 18  SpO2:  [96 %-98 %] 98 %  BP: (129-141)/(70-83) 131/73     Weight: 46.4 kg (102 lb 4.7 oz)  Body mass index is 18.12 kg/m².  Body surface area is 1.44 meters squared.      Intake/Output Summary (Last 24 hours) at 01/03/18 1729  Last data filed at 01/03/18 1000   Gross per 24 hour   Intake              300 ml   Output              700 ml   Net             -400 ml       Physical Exam   HENT:   Mouth/Throat: No oropharyngeal exudate.   Neck: Neck supple. No thyromegaly present.   Cardiovascular: Normal rate and regular rhythm.    Pulmonary/Chest: Effort normal. No respiratory distress. She has decreased breath sounds. She has no wheezes. She has rales.   Lymphadenopathy:     She has no cervical adenopathy.   Skin: No bruising and no petechiae noted.   Psychiatric: Cognition and memory are normal.   Vitals reviewed.      Significant Labs:   All pertinent labs from the last 24 hours have been reviewed.    Diagnostic Results:  I have reviewed all pertinent imaging results/findings within the past 24 hours.    Assessment/Plan:     Malignant neoplasm of left lung stage 4    43 yo female with aggressive Stage 4 poorly differentiated lung adenocarcinoma with rapidly progressive disease.    Continue Dexamethasone 4 mg bid for asymptomatic brain mets with edema - Refer to [facility] after hospital discharge    S/p first cycle of chemotherapy - 12/27/17. Next cycle due 1/17.    Agree with morphine and megace.    D/w . Possible d/c tomorrow.                    Vinnie Quiros MD  Hematology/Oncology  [facility]

## 2018-01-03 NOTE — SUBJECTIVE & OBJECTIVE
Interval History: started on morphine and megace     Oncology Treatment Plan:   CARBOPLATIN (AUC) + ETOPOSIDE    Medications:  Continuous Infusions:  Scheduled Meds:   albuterol-ipratropium 2.5mg-0.5mg/3mL  3 mL Nebulization Q6H WAKE    buPROPion  300 mg Oral Daily    dexamethasone  4 mg Oral BID    enoxaparin  40 mg Subcutaneous Daily    [START ON 1/4/2018] megestrol  400 mg Oral BID    metoclopramide HCl  10 mg Oral TID AC    morphine  30 mg Oral Q12H    pantoprazole  40 mg Oral Daily    polyethylene glycol  17 g Oral BID    sodium chloride 0.9%  3 mL Intravenous Q8H     PRN Meds:acetaminophen, albuterol-ipratropium 2.5mg-0.5mg/3mL, alteplase, aluminum-magnesium hydroxide-simethicone, bisacodyl, diphenhydrAMINE, heparin, porcine (PF), hydrocodone-acetaminophen 7.5-325mg, HYDROmorphone, lactulose, LORazepam, ondansetron, sodium chloride 0.9%, sodium chloride 0.9%, sodium chloride 0.9%     Review of Systems   Constitutional: Negative for fever and unexpected weight change.   HENT: Negative for mouth sores and nosebleeds.    Respiratory: Positive for shortness of breath. Negative for wheezing.    Cardiovascular: Negative for chest pain and palpitations.   Gastrointestinal: Negative for abdominal pain and vomiting.   Psychiatric/Behavioral: Negative for behavioral problems and confusion.     Objective:     Vital Signs (Most Recent):  Temp: 97.9 °F (36.6 °C) (01/03/18 1525)  Pulse: (!) 113 (01/03/18 1525)  Resp: 18 (01/03/18 1525)  BP: 131/73 (01/03/18 1525)  SpO2: 98 % (01/03/18 1527) Vital Signs (24h Range):  Temp:  [97.6 °F (36.4 °C)-98.2 °F (36.8 °C)] 97.9 °F (36.6 °C)  Pulse:  [102-116] 113  Resp:  [18-26] 18  SpO2:  [96 %-98 %] 98 %  BP: (129-141)/(70-83) 131/73     Weight: 46.4 kg (102 lb 4.7 oz)  Body mass index is 18.12 kg/m².  Body surface area is 1.44 meters squared.      Intake/Output Summary (Last 24 hours) at 01/03/18 1722  Last data filed at 01/03/18 1000   Gross per 24 hour   Intake               300 ml   Output              700 ml   Net             -400 ml       Physical Exam   HENT:   Mouth/Throat: No oropharyngeal exudate.   Neck: Neck supple. No thyromegaly present.   Cardiovascular: Normal rate and regular rhythm.    Pulmonary/Chest: Effort normal. No respiratory distress. She has decreased breath sounds. She has no wheezes. She has rales.   Lymphadenopathy:     She has no cervical adenopathy.   Skin: No bruising and no petechiae noted.   Psychiatric: Cognition and memory are normal.   Vitals reviewed.      Significant Labs:   All pertinent labs from the last 24 hours have been reviewed.    Diagnostic Results:  I have reviewed all pertinent imaging results/findings within the past 24 hours.

## 2018-01-03 NOTE — ASSESSMENT & PLAN NOTE
41 yo female with aggressive Stage 4 poorly differentiated lung adenocarcinoma with rapidly progressive disease.    Continue Dexamethasone 4 mg bid for asymptomatic brain mets with edema - Refer to [facility] after hospital discharge    S/p first cycle of chemotherapy - 12/27/17. Next cycle due 1/17.    Agree with morphine and tu.    D/w . Possible d/c tomorrow.

## 2018-01-03 NOTE — PLAN OF CARE
Problem: Patient Care Overview  Goal: Plan of Care Review  Outcome: Ongoing (interventions implemented as appropriate)  Pt in NAD. V/s stable. AAOx4. Scheduled medications given per MAR. PIV saline locked dressing CDI. Continuous cardiac monitoring in place  box:8714. Full liquid diet maintained. Lung sounds auscultated posterior and lateral documented per flow sheet. Pt reports SOB upon ambulation. Pt receiving 6L via high flow canula sat: 96%. Drain to L chest clamped and secured to abdomen dressing has scant brown drainage. Encouraged to call for assistance verbalized understanding. Safety maintained bed in low locked position, SR up X2, and call bell in reach. Will continue to monitor.

## 2018-01-03 NOTE — PT/OT/SLP PROGRESS
Physical Therapy      Patient Name:  Yen Falcon   MRN:  [id number]    Patient not seen this pm secondary to Patient unwilling to participate family visiting. Will follow up as able    Kp Garza, PT

## 2018-01-03 NOTE — PLAN OF CARE
Problem: Patient Care Overview  Goal: Plan of Care Review  Outcome: Ongoing (interventions implemented as appropriate)  Patient on High Flow NC with SpO2 97%. The proper method of use, as well as anticipated side effects, of this aerosol treatment are discussed and demonstrated to the patient. Will continue to monitor.

## 2018-01-03 NOTE — NURSING
Pt in NAD resting comfortably in bed, reports severe dry mouth after taking nystatin dose and refused am dose. Will continue to monitor.

## 2018-01-03 NOTE — PHYSICIAN QUERY
PT Name: Yen Falcon  MR #: [id number]     Physician Query Form - Documentation Clarification      CDS/: Rosaura Steen RN CDI      Contact information: [phone]    This form is a permanent document in the medical record.     Query Date: January 3, 2018    By submitting this query, we are merely seeking further clarification of documentation. Please utilize your independent clinical judgment when addressing the question(s) below.    The Medical record reflects the following:    Supporting Clinical Findings Location in Medical Record    Hx Asthma diagnoses in 2013   Continue supplemental oxygen.   Wean down to low flow nasal cannula as able.    Give scheduled nebulizer treatments de to asthma history and wheeze on exam.      Progress note 12/30 1240 am                                                                            Doctor, Please specify diagnosis or diagnoses associated with above clinical findings.    Provider Use Only     (  x) Mild Asthma, Intermittent, with exacerbation  (  ) Mild Asthma, Intermittent, without exacerbation    (  ) Mild Asthma, Persistent, with exacerbation  (  ) Mild Asthma, Persistent, without exacerbation    (  ) Moderate Asthma, Persistent with exacerbation  (  ) Moderate Asthma, Persistent without exacerbation    (  ) Severe Asthma, Persistent with exacerbation  (  ) Severe Asthma, Persistent without exacerbation    (  ) Other diagnosis: _____________________                                                                                                             [  ] Clinically undetermined

## 2018-01-03 NOTE — PROGRESS NOTES
[facility] Hospital Medicine  Progress Note    Patient Name: Yen Falcon  MRN: [id number]  Patient Class: IP- Inpatient   Admission Date: 12/26/2017  Length of Stay: 7 days  Attending Physician: Kristian Chiang MD  Primary Care Provider: Lauren Goodman MD        Subjective:     Principal Problem:Acute hypoxemic respiratory failure    HPI:  Yen Falcon is  42 y.o.  woman (LMP: 11/26/17) with a history of recurrent upper respiratory infection, asthma diagnosed in 2013, smoking that she quit when she was diagnosed with asthma, a left malignant pleural effusion since 10/30/17 due to stage 4 left lung carcinoma.  She is a former smoker, but quit when she was diagnosed with asthma.  She lives in [place], [place].   She was found to have a left pleural effusion at [facility] on 10/30/17 that was subsequently found to be due to metastatic lung cancer after a biopsy of a right axillary lymph node metastasis on 12/18/17.  She was hospitalized at [facility] from 12/22/17 to 12/23/17 after being transferred from [facility] for hypoxia due to the effusion.  Interventional Radiology performed thoracentesis removing 1.25 liters.  She was seen by Pulmonology (Dr. Cheko Batista and Dr. Branden Manzano) who recommended prophylactic enoxaparin.  She was seen by hematologist Dr. Vinnie Quiros to establish care.  She was prescribed home oxygen with expectation for the effusion to reaccumulate.   She returned to [facility] the day after [place] with worsening shortness of breath confirmed to be due to effusion reaccumulation on chest x-ray.   Dr. Foote and Dr. Manzano drained another 1.1 liter and planned inpatient Pleur-X catheter placement, which would not be able to be done for another 2 days due to lack of staff.  She was readmitted to [facility] for this.    Hospital  Course:  She reported constipation since her last hospitalization so was given bisacodyl and polyethylene glycol.  Dr. Batista suspected she had adenocarcinoma and needed platinum based therapy.  Dr. Manzano obtained the biopsy results from [place], which showed that it was poorly differentiated adenocarcinoma.  They placed a PleurX catheter at bedside in the ICU.  After discussion with Dr. Quiros, it was decided she needed inpatient chemotherapy due to rapid progression.  She was started on carboplatin and etoposide.  Brain MRI and CT of the chest/abdomen/pelvis showed extensive metastases including in the brain, with vasogenic edema.  Dr. Quiros started steroids.  She required high flow nasal cannula.  She was transferred out of the ICU on 12/29/17.  She continued to complain of a sensation of food building up in her upper abdomen.  Pulmonology felt her pancreatic metastasis may be involved, and recommended Gastroenterology evaluation.    Interval History: Says that she is feeling better today. Still with a little cough and MAIER when walking to the bathroom. Having BMs still.     Review of Systems   Constitutional: Negative for chills and fever.   Respiratory: Negative for cough and shortness of breath.    Cardiovascular: Negative for chest pain and palpitations.   Gastrointestinal: Negative for nausea and vomiting.     Objective:     Vital Signs (Most Recent):  Temp: 97.8 °F (36.6 °C) (01/02/18 1736)  Pulse: 110 (01/02/18 1736)  Resp: 20 (01/02/18 1736)  BP: 132/70 (01/02/18 1736)  SpO2: 99 % (01/02/18 1546) Vital Signs (24h Range):  Temp:  [97.7 °F (36.5 °C)-98 °F (36.7 °C)] 97.8 °F (36.6 °C)  Pulse:  [] 110  Resp:  [16-22] 20  SpO2:  [92 %-99 %] 99 %  BP: (120-179)/(63-85) 132/70     Weight: 46.4 kg (102 lb 4.7 oz)  Body mass index is 18.12 kg/m².    Intake/Output Summary (Last 24 hours) at 01/02/18 1915  Last data filed at 01/02/18 0800   Gross per 24 hour   Intake              338 ml   Output               300 ml   Net               38 ml      Physical Exam   Constitutional: She is oriented to person, place, and time. She appears well-developed. No distress.   Cardiovascular: Normal rate and regular rhythm.    No murmur heard.  Pulmonary/Chest: Effort normal. No respiratory distress. She has decreased breath sounds. She has no wheezes.   Abdominal: Soft. She exhibits no distension. Bowel sounds are decreased. There is tenderness in the epigastric area.   Musculoskeletal: She exhibits no edema.   Neurological: She is alert and oriented to person, place, and time.   Skin: Skin is warm and dry.   Psychiatric: She has a normal mood and affect. Her behavior is normal.   Nursing note and vitals reviewed.      Significant Labs:   CBC:   Recent Labs  Lab 01/01/18 0427 01/02/18  0535   WBC 23.07* 26.33*   HGB 10.7* 11.0*   HCT 34.3* 34.9*    214     CMP:   Recent Labs  Lab 01/01/18 0427 01/02/18  0535    137   K 4.4 4.3    98   CO2 27 29   * 135*   BUN 21* 21*   CREATININE 0.6 0.6   CALCIUM 8.4* 8.6*   PROT 5.5* 5.7*   ALBUMIN 2.0* 2.2*   BILITOT 0.3 0.4   ALKPHOS 150* 153*   AST 34 33   ALT 49* 44   ANIONGAP 11 10   EGFRNONAA >60 >60     Magnesium:   Recent Labs  Lab 01/01/18 0427 01/02/18  0535   MG 1.8 2.0       Significant Imaging: I have reviewed all pertinent imaging results/findings within the past 24 hours.    Assessment/Plan:      * Acute hypoxemic respiratory failure    Multifactorial. Switch from the comfort flow to HFNC and wean back down to low flow NC.           Pleural effusion on left    Admission for chest tube placement  Acute hypoxemic respiratory failure  Malignant neoplasm of left lung stage 4  Malignant neoplasm metastatic to lymph node of axilla  Asthma  Brain metastases with cerebral edema  Continue supplemental oxygen. Switch from comfort flow to HFNC. Continue scheduled nebulizer treatments.  Continue prophylactic enoxaparin.  Pulmonology placed Pleur-X catheter.   Started chemotherapy and will receive next round on 1/17/18.  Steroids started for brain metastases, and pantoprazole started due to abdominal complaints.  Give vanilla Boost supplements. Decrease dexamethasone to BID.         Constipation    Continue polyethylene glycol. Lactulose as needed.         Bilateral pneumonia    Continue moxifloxacin started last admission.          Anxiety    Continue bupropion and lorazepam PRN.            VTE Risk Mitigation         Ordered     heparin, porcine (PF) 100 unit/mL injection flush 500 Units  As needed (PRN)     Route:  Intravenous        12/27/17 1210     enoxaparin injection 40 mg  Daily     Route:  Subcutaneous        12/26/17 2223     Medium Risk of VTE  Once      12/26/17 2223              Kristian Chiang MD  Department of Hospital Medicine   [facility]

## 2018-01-04 PROBLEM — E44.0 MODERATE PROTEIN-CALORIE MALNUTRITION: Status: ACTIVE | Noted: 2017-12-30

## 2018-01-04 LAB
ALBUMIN SERPL BCP-MCNC: 2.1 G/DL
ALP SERPL-CCNC: 148 U/L
ALT SERPL W/O P-5'-P-CCNC: 42 U/L
ANION GAP SERPL CALC-SCNC: 8 MMOL/L
AST SERPL-CCNC: 28 U/L
BASOPHILS # BLD AUTO: 0.01 K/UL
BASOPHILS NFR BLD: 0.1 %
BILIRUB SERPL-MCNC: 0.5 MG/DL
BUN SERPL-MCNC: 22 MG/DL
CALCIUM SERPL-MCNC: 8.7 MG/DL
CHLORIDE SERPL-SCNC: 99 MMOL/L
CO2 SERPL-SCNC: 30 MMOL/L
CREAT SERPL-MCNC: 0.6 MG/DL
DIFFERENTIAL METHOD: ABNORMAL
EOSINOPHIL # BLD AUTO: 0.1 K/UL
EOSINOPHIL NFR BLD: 0.5 %
ERYTHROCYTE [DISTWIDTH] IN BLOOD BY AUTOMATED COUNT: 12.8 %
EST. GFR  (AFRICAN AMERICAN): >60 ML/MIN/1.73 M^2
EST. GFR  (NON AFRICAN AMERICAN): >60 ML/MIN/1.73 M^2
GLUCOSE SERPL-MCNC: 136 MG/DL
HCT VFR BLD AUTO: 31.4 %
HGB BLD-MCNC: 9.9 G/DL
LYMPHOCYTES # BLD AUTO: 1.5 K/UL
LYMPHOCYTES NFR BLD: 9 %
MAGNESIUM SERPL-MCNC: 1.6 MG/DL
MCH RBC QN AUTO: 28.3 PG
MCHC RBC AUTO-ENTMCNC: 31.5 G/DL
MCV RBC AUTO: 90 FL
MONOCYTES # BLD AUTO: 0.2 K/UL
MONOCYTES NFR BLD: 1.3 %
NEUTROPHILS # BLD AUTO: 14.6 K/UL
NEUTROPHILS NFR BLD: 86.8 %
PHOSPHATE SERPL-MCNC: 3.2 MG/DL
PLATELET # BLD AUTO: 143 K/UL
PMV BLD AUTO: 10.7 FL
POTASSIUM SERPL-SCNC: 4.3 MMOL/L
PROT SERPL-MCNC: 5.6 G/DL
RBC # BLD AUTO: 3.5 M/UL
SODIUM SERPL-SCNC: 137 MMOL/L
WBC # BLD AUTO: 16.86 K/UL

## 2018-01-04 PROCEDURE — 99232 SBSQ HOSP IP/OBS MODERATE 35: CPT | Mod: ,,, | Performed by: INTERNAL MEDICINE

## 2018-01-04 PROCEDURE — 84100 ASSAY OF PHOSPHORUS: CPT

## 2018-01-04 PROCEDURE — 25000003 PHARM REV CODE 250: Performed by: EMERGENCY MEDICINE

## 2018-01-04 PROCEDURE — 27000221 HC OXYGEN, UP TO 24 HOURS

## 2018-01-04 PROCEDURE — 85025 COMPLETE CBC W/AUTO DIFF WBC: CPT

## 2018-01-04 PROCEDURE — A4216 STERILE WATER/SALINE, 10 ML: HCPCS | Performed by: EMERGENCY MEDICINE

## 2018-01-04 PROCEDURE — 25000242 PHARM REV CODE 250 ALT 637 W/ HCPCS: Performed by: HOSPITALIST

## 2018-01-04 PROCEDURE — 25000003 PHARM REV CODE 250: Performed by: HOSPITALIST

## 2018-01-04 PROCEDURE — 25000003 PHARM REV CODE 250: Performed by: INTERNAL MEDICINE

## 2018-01-04 PROCEDURE — 83735 ASSAY OF MAGNESIUM: CPT

## 2018-01-04 PROCEDURE — 80053 COMPREHEN METABOLIC PANEL: CPT

## 2018-01-04 PROCEDURE — 94640 AIRWAY INHALATION TREATMENT: CPT

## 2018-01-04 PROCEDURE — 63600175 PHARM REV CODE 636 W HCPCS: Performed by: HOSPITALIST

## 2018-01-04 PROCEDURE — S0179 MEGESTROL 20 MG: HCPCS | Performed by: INTERNAL MEDICINE

## 2018-01-04 PROCEDURE — 97116 GAIT TRAINING THERAPY: CPT

## 2018-01-04 PROCEDURE — 36415 COLL VENOUS BLD VENIPUNCTURE: CPT

## 2018-01-04 PROCEDURE — 94761 N-INVAS EAR/PLS OXIMETRY MLT: CPT

## 2018-01-04 PROCEDURE — 63600175 PHARM REV CODE 636 W HCPCS: Performed by: INTERNAL MEDICINE

## 2018-01-04 PROCEDURE — 11000001 HC ACUTE MED/SURG PRIVATE ROOM

## 2018-01-04 RX ORDER — HYDROMORPHONE HYDROCHLORIDE 2 MG/1
2 TABLET ORAL EVERY 4 HOURS PRN
Status: DISCONTINUED | OUTPATIENT
Start: 2018-01-04 | End: 2018-01-05

## 2018-01-04 RX ADMIN — MORPHINE SULFATE 30 MG: 30 TABLET, EXTENDED RELEASE ORAL at 09:01

## 2018-01-04 RX ADMIN — SODIUM CHLORIDE, PRESERVATIVE FREE 3 ML: 5 INJECTION INTRAVENOUS at 10:01

## 2018-01-04 RX ADMIN — IPRATROPIUM BROMIDE AND ALBUTEROL SULFATE 3 ML: .5; 3 SOLUTION RESPIRATORY (INHALATION) at 07:01

## 2018-01-04 RX ADMIN — METOCLOPRAMIDE 10 MG: 10 TABLET ORAL at 11:01

## 2018-01-04 RX ADMIN — PANTOPRAZOLE SODIUM 40 MG: 40 TABLET, DELAYED RELEASE ORAL at 08:01

## 2018-01-04 RX ADMIN — LORAZEPAM 1 MG: 1 TABLET ORAL at 10:01

## 2018-01-04 RX ADMIN — IPRATROPIUM BROMIDE AND ALBUTEROL SULFATE 3 ML: .5; 3 SOLUTION RESPIRATORY (INHALATION) at 04:01

## 2018-01-04 RX ADMIN — ENOXAPARIN SODIUM 40 MG: 100 INJECTION SUBCUTANEOUS at 04:01

## 2018-01-04 RX ADMIN — HYDROMORPHONE HYDROCHLORIDE 2 MG: 2 TABLET ORAL at 01:01

## 2018-01-04 RX ADMIN — IPRATROPIUM BROMIDE AND ALBUTEROL SULFATE 3 ML: .5; 3 SOLUTION RESPIRATORY (INHALATION) at 12:01

## 2018-01-04 RX ADMIN — BUPROPION HYDROCHLORIDE 300 MG: 150 TABLET, FILM COATED, EXTENDED RELEASE ORAL at 08:01

## 2018-01-04 RX ADMIN — METOCLOPRAMIDE 10 MG: 10 TABLET ORAL at 04:01

## 2018-01-04 RX ADMIN — MEGESTROL ACETATE 400 MG: 40 SUSPENSION ORAL at 09:01

## 2018-01-04 RX ADMIN — HYDROMORPHONE HYDROCHLORIDE 1 MG: 2 INJECTION INTRAMUSCULAR; INTRAVENOUS; SUBCUTANEOUS at 09:01

## 2018-01-04 RX ADMIN — HYDROMORPHONE HYDROCHLORIDE 1 MG: 2 INJECTION INTRAMUSCULAR; INTRAVENOUS; SUBCUTANEOUS at 05:01

## 2018-01-04 RX ADMIN — MEGESTROL ACETATE 400 MG: 40 SUSPENSION ORAL at 08:01

## 2018-01-04 RX ADMIN — SODIUM CHLORIDE, PRESERVATIVE FREE 3 ML: 5 INJECTION INTRAVENOUS at 05:01

## 2018-01-04 RX ADMIN — METOCLOPRAMIDE 10 MG: 10 TABLET ORAL at 05:01

## 2018-01-04 RX ADMIN — POLYETHYLENE GLYCOL 3350 17 G: 17 POWDER, FOR SOLUTION ORAL at 08:01

## 2018-01-04 RX ADMIN — POLYETHYLENE GLYCOL 3350 17 G: 17 POWDER, FOR SOLUTION ORAL at 09:01

## 2018-01-04 RX ADMIN — DEXAMETHASONE 4 MG: 4 TABLET ORAL at 09:01

## 2018-01-04 RX ADMIN — IPRATROPIUM BROMIDE AND ALBUTEROL SULFATE 3 ML: .5; 3 SOLUTION RESPIRATORY (INHALATION) at 11:01

## 2018-01-04 RX ADMIN — MORPHINE SULFATE 30 MG: 30 TABLET, EXTENDED RELEASE ORAL at 08:01

## 2018-01-04 RX ADMIN — IPRATROPIUM BROMIDE AND ALBUTEROL SULFATE 3 ML: .5; 3 SOLUTION RESPIRATORY (INHALATION) at 02:01

## 2018-01-04 RX ADMIN — DEXAMETHASONE 4 MG: 4 TABLET ORAL at 08:01

## 2018-01-04 NOTE — PROGRESS NOTES
.Pharmacy New Medication Education    Patient accepted medication education.    Pharmacy educated patient on name and purpose of medications and possible side effects, using the teach-back method.     D/C Current Inpatient Medication Orders   D/C acetaminophen tablet 650 mg   D/C albuterol-ipratropium 2.5mg-0.5mg/3mL nebulizer solution 3 mL   D/C albuterol-ipratropium 2.5mg-0.5mg/3mL nebulizer solution 3 mL   D/C alteplase injection 2 mg   D/C aluminum-magnesium hydroxide-simethicone 200-200-20 mg/5 mL suspension 30 mL   D/C bisacodyl suppository 10 mg   D/C buPROPion TB24 tablet 300 mg   D/C dexamethasone tablet 4 mg   D/C diphenhydrAMINE capsule 25 mg   D/C enoxaparin injection 40 mg   D/C heparin, porcine (PF) 100 unit/mL injection flush 500 Units   D/C hydrocodone-acetaminophen 7.5-325mg per tablet 1 tablet   D/C hydromorphone (PF) injection 1 mg   D/C HYDROmorphone tablet 2 mg   D/C lactulose 20 gram/30 mL solution Soln 15 g   D/C LORazepam tablet 1 mg   D/C megestrol 400 mg/10 mL (10 mL) suspension 400 mg   D/C metoclopramide HCl tablet 10 mg   D/C morphine 12 hr tablet 30 mg   D/C ondansetron injection 4 mg   D/C pantoprazole EC tablet 40 mg   D/C polyethylene glycol packet 17 g   D/C sodium chloride 0.9% flush 10 mL   D/C sodium chloride 0.9% flush 10 mL   D/C sodium chloride 0.9% flush 3 mL   D/C sodium chloride 0.9% flush 5 mL       Learners of pharmacy medication education included:  Patient    Patient +/- learner response:  Verbalized Understanding, Teachback

## 2018-01-04 NOTE — SUBJECTIVE & OBJECTIVE
Interval History: Feeling okay today. Gets more SOB when she is ambulating. Slept well and did not wake up to need any pain medication.     Review of Systems   Constitutional: Negative for chills and fever.   Respiratory: Positive for shortness of breath. Negative for cough.    Cardiovascular: Negative for chest pain and palpitations.   Gastrointestinal: Negative for nausea and vomiting.     Objective:     Vital Signs (Most Recent):  Temp: 98.6 °F (37 °C) (01/04/18 1517)  Pulse: (!) 113 (01/04/18 1517)  Resp: 20 (01/04/18 1517)  BP: (!) 154/80 (01/04/18 1517)  SpO2: 97 % (01/04/18 1436) Vital Signs (24h Range):  Temp:  [97.2 °F (36.2 °C)-98.6 °F (37 °C)] 98.6 °F (37 °C)  Pulse:  [] 113  Resp:  [17-22] 20  SpO2:  [95 %-98 %] 97 %  BP: (125-161)/(60-82) 154/80     Weight: 46.4 kg (102 lb 4.7 oz)  Body mass index is 18.12 kg/m².    Intake/Output Summary (Last 24 hours) at 01/04/18 1557  Last data filed at 01/04/18 0518   Gross per 24 hour   Intake                0 ml   Output              575 ml   Net             -575 ml      Physical Exam    Significant Labs:   CBC:   Recent Labs  Lab 01/03/18  0741 01/04/18  0500   WBC 20.36* 16.86*   HGB 9.9* 9.9*   HCT 31.5* 31.4*    143*     CMP:   Recent Labs  Lab 01/03/18  0741 01/04/18  0500    137   K 4.4 4.3   CL 99 99   CO2 28 30*   * 136*   BUN 22* 22*   CREATININE 0.6 0.6   CALCIUM 8.5* 8.7   PROT 5.5* 5.6*   ALBUMIN 2.1* 2.1*   BILITOT 0.5 0.5   ALKPHOS 142* 148*   AST 30 28   ALT 38 42   ANIONGAP 9 8   EGFRNONAA >60 >60     Magnesium:   Recent Labs  Lab 01/03/18  0741 01/04/18  0500   MG 1.7 1.6     Significant Imaging: I have reviewed all pertinent imaging results/findings within the past 24 hours.

## 2018-01-04 NOTE — PLAN OF CARE
Problem: Physical Therapy Goal  Goal: Physical Therapy Goal  Goals to be met by: 18     Patient will increase functional independence with mobility by performin. Sit to stand transfer with [place]  2. Bed to chair transfer with [place]  3. Gait  x 150 feet with [place]      Outcome: Ongoing (interventions implemented as appropriate)  Continue working toward goals.

## 2018-01-04 NOTE — PLAN OF CARE
"Pt in pain offered  PO dilaudid, pt refused. Pt preferred dilaudid IV "this time and the next times she'll take the pill." will notify MD and continue to monitor.  "

## 2018-01-04 NOTE — PLAN OF CARE
Problem: Patient Care Overview  Goal: Plan of Care Review  Outcome: Ongoing (interventions implemented as appropriate)  Pt in NAD. V/s stable. AAOx4. Scheduled medications given per MAR. PIV saline locked dressing CDI. Continuous cardiac monitoring in place  box:2614. Full liquid diet maintained. Lung sounds auscultated documented per flow sheet. Pt reports SOB upon ambulation. Pt receiving 6L via high flow canula sat: 96%. Drain to L chest clamped and secured to abdomen dressing has scant brown drainage. Pt reports moderate pain relieved by PRN and scheduled meds. Encouraged to call for assistance verbalized understanding. Safety maintained bed in low locked position, SR up X2, and call bell in reach. Will continue to monitor

## 2018-01-04 NOTE — PROGRESS NOTES
[facility] Hospital Medicine  Progress Note    Patient Name: Yen Falcon  MRN: [id number]  Patient Class: IP- Inpatient   Admission Date: 12/26/2017  Length of Stay: 9 days  Attending Physician: Kristian Chiang MD  Primary Care Provider: Lauren Goodman MD        Subjective:     Principal Problem:Acute hypoxemic respiratory failure    HPI:  Yen Falcon is  42 y.o.  woman (LMP: 11/26/17) with a history of recurrent upper respiratory infection, asthma diagnosed in 2013, smoking that she quit when she was diagnosed with asthma, a left malignant pleural effusion since 10/30/17 due to stage 4 left lung carcinoma.  She is a former smoker, but quit when she was diagnosed with asthma.  She lives in [place], [place].   She was found to have a left pleural effusion at [facility] on 10/30/17 that was subsequently found to be due to metastatic lung cancer after a biopsy of a right axillary lymph node metastasis on 12/18/17.  She was hospitalized at [facility] from 12/22/17 to 12/23/17 after being transferred from [facility] for hypoxia due to the effusion.  Interventional Radiology performed thoracentesis removing 1.25 liters.  She was seen by Pulmonology (Dr. Cheko Batista and Dr. Branden Manzano) who recommended prophylactic enoxaparin.  She was seen by hematologist Dr. Vinnie Quiros to establish care.  She was prescribed home oxygen with expectation for the effusion to reaccumulate.   She returned to [facility] the day after [place] with worsening shortness of breath confirmed to be due to effusion reaccumulation on chest x-ray.   Dr. Foote and Dr. Manzano drained another 1.1 liter and planned inpatient Pleur-X catheter placement, which would not be able to be done for another 2 days due to lack of staff.  She was readmitted to [facility] for this.    Hospital  Course:  She reported constipation since her last hospitalization so was given bisacodyl and polyethylene glycol.  Dr. Batista suspected she had adenocarcinoma and needed platinum based therapy.  Dr. Manzano obtained the biopsy results from [place], which showed that it was poorly differentiated adenocarcinoma.  They placed a PleurX catheter at bedside in the ICU.  After discussion with Dr. Quiros, it was decided she needed inpatient chemotherapy due to rapid progression.  She was started on carboplatin and etoposide.  Brain MRI and CT of the chest/abdomen/pelvis showed extensive metastases including in the brain, with vasogenic edema.  Dr. Quiros started steroids.  She required high flow nasal cannula.  She was transferred out of the ICU on 12/29/17.  She continued to complain of a sensation of food building up in her upper abdomen.  Pulmonology felt her pancreatic metastasis may be involved, and recommended Gastroenterology evaluation.    Interval History: Still with some abdominal pain, but a little better. Feels a little nausea, but no emesis. No BM today.     Review of Systems   Constitutional: Negative for chills and fever.   Respiratory: Negative for cough and shortness of breath.    Cardiovascular: Negative for chest pain and palpitations.   Gastrointestinal: Positive for nausea. Negative for vomiting.     Objective:     Vital Signs (Most Recent):  Temp: 97.6 °F (36.4 °C) (01/03/18 1144)  Pulse: (!) 116 (01/03/18 1312)  Resp: (!) 24 (01/03/18 1312)  BP: (!) 141/76 (01/03/18 1144)  SpO2: 97 % (01/03/18 1312) Vital Signs (24h Range):  Temp:  [97.6 °F (36.4 °C)-98.2 °F (36.8 °C)] 97.6 °F (36.4 °C)  Pulse:  [] 116  Resp:  [18-26] 24  SpO2:  [96 %-99 %] 97 %  BP: (129-141)/(70-83) 141/76     Weight: 46.4 kg (102 lb 4.7 oz)  Body mass index is 18.12 kg/m².    Intake/Output Summary (Last 24 hours) at 01/03/18 1418  Last data filed at 01/03/18 1000   Gross per 24 hour   Intake              300 ml    Output              700 ml   Net             -400 ml      Physical Exam   Constitutional: She is oriented to person, place, and time. She appears well-developed. No distress.   Cardiovascular: Normal rate and regular rhythm.    No murmur heard.  Pulmonary/Chest: Effort normal. No respiratory distress. She has decreased breath sounds. She has no wheezes.   Abdominal: Soft. She exhibits no distension. Bowel sounds are decreased. There is tenderness in the epigastric area.   Musculoskeletal: She exhibits no edema.   Neurological: She is alert and oriented to person, place, and time.   Skin: Skin is warm and dry.   Psychiatric: She has a normal mood and affect. Her behavior is normal.   Nursing note and vitals reviewed.      Significant Labs:   CBC:   Recent Labs  Lab 01/02/18  0535 01/03/18  0741   WBC 26.33* 20.36*   HGB 11.0* 9.9*   HCT 34.9* 31.5*    154     CMP:   Recent Labs  Lab 01/02/18  0535 01/03/18  0741    136   K 4.3 4.4   CL 98 99   CO2 29 28   * 122*   BUN 21* 22*   CREATININE 0.6 0.6   CALCIUM 8.6* 8.5*   PROT 5.7* 5.5*   ALBUMIN 2.2* 2.1*   BILITOT 0.4 0.5   ALKPHOS 153* 142*   AST 33 30   ALT 44 38   ANIONGAP 10 9   EGFRNONAA >60 >60     Magnesium:   Recent Labs  Lab 01/02/18  0535 01/03/18  0741   MG 2.0 1.7       Significant Imaging: I have reviewed all pertinent imaging results/findings within the past 24 hours.    Assessment/Plan:      * Acute hypoxemic respiratory failure    Multifactorial. Down to HFNC at 6 LPM and good sats. Continue to wean as able.         Pleural effusion on left    Admission for chest tube placement  Acute hypoxemic respiratory failure  Malignant neoplasm of left lung stage 4  Malignant neoplasm metastatic to lymph node of axilla  Asthma  Brain metastases with cerebral edema  Continue supplemental oxygen. Continue HFNC. Continue scheduled nebulizer treatments.  Continue prophylactic enoxaparin. Pulmonology placed Pleur-X catheter.  Started chemotherapy  and will receive next round on 1/17/18.  Steroids started for brain metastases, and pantoprazole started due to abdominal complaints.  Give vanilla Boost supplements. Decrease dexamethasone to BID.         Moderate protein-calorie malnutrition    Not eating much currently. Will start on megace 800 mg daily. Continue dexamethasone.           Constipation    Continue polyethylene glycol. Lactulose as needed.         Bilateral pneumonia    Completed moxifloxacin started last admission.          Anxiety    Continue bupropion and lorazepam PRN.            VTE Risk Mitigation         Ordered     heparin, porcine (PF) 100 unit/mL injection flush 500 Units  As needed (PRN)     Route:  Intravenous        12/27/17 1210     enoxaparin injection 40 mg  Daily     Route:  Subcutaneous        12/26/17 2223     Medium Risk of VTE  Once      12/26/17 2223              Kristian Chiang MD  Department of Hospital Medicine   [facility]

## 2018-01-04 NOTE — PLAN OF CARE
Problem: Patient Care Overview  Goal: Plan of Care Review  Outcome: Ongoing (interventions implemented as appropriate)  Pt's SpO2 95% on 6 lpm humidified NC. No adverse reactions to aerosol tx. No respiratory distress noted. Continue to monitor SpO2.

## 2018-01-04 NOTE — ASSESSMENT & PLAN NOTE
Admission for chest tube placement  Acute hypoxemic respiratory failure  Malignant neoplasm of left lung stage 4  Malignant neoplasm metastatic to lymph node of axilla  Asthma  Brain metastases with cerebral edema  Continue supplemental oxygen. Continue HFNC. Continue scheduled nebulizer treatments.  Continue prophylactic enoxaparin. Pulmonology placed Pleur-X catheter.  Started chemotherapy and will receive next round on 1/17/18.  Steroids started for brain metastases, and pantoprazole started due to abdominal complaints.  Give vanilla Boost supplements. Decrease dexamethasone to BID.

## 2018-01-04 NOTE — PLAN OF CARE
Problem: Patient Care Overview  Goal: Plan of Care Review  Outcome: Ongoing (interventions implemented as appropriate)  Pt AAOX4. VSS. No distress noted. Respirations even and unlabored. Complains of pain relieved by prn medication. Denies n/v. Safety maintained. Will continue to monitor.

## 2018-01-04 NOTE — PHYSICIAN QUERY
PT Name: Yen Falcon  MR #: [id number]     Physician Query Form - Documentation Clarification      CDS/: Nuria Senior RN  CCDS               Contact information: [email or web address]    This form is a permanent document in the medical record.     Query Date: January 4, 2018    By submitting this query, we are merely seeking further clarification of documentation. Please utilize your independent clinical judgment when addressing the question(s) below.    The Medical record reflects the following:    Supporting Clinical Findings Location in Medical Record    Repeat x-ray showed the suspected tumor that was disguised by the pleural effusion. Chest CT showed a left suprahilar mass appearing to encase the left main stem and upper lobe bronchi as well as the left main pulmonary artery, mediastinal and right axillary lymphadenopathy, and scattered consolidations in the lungs apart from the area obstructed by the mass.  PRINCIPAL PROBLEM: Pleural effusion on left    Carcinoma of left lung    Malignant neoplasm metastatic to lymph node of axilla   Discharge Summary    SPECIMEN  1) Left Pleural Effusion  Supplemental Diagnosis  Supplemental report for immunostain results.  Positive: CK7, BerEP-4, TTF-1, CK 5/6 (focal)  Negative: CK20, p63, GCDFP, Calretinin, and WT-1  Background mesothelial cells are also positive for CK7, Calretinin and rare cells positive for WT-1.     The immunohistochemical profile is consistent with adenocarcinoma of pulmonary origin.     FINAL PATHOLOGIC DIAGNOSIS  Left pleural effusion (ThinPrep and cell block):  -Positive for malignant cells    Pathology reported 1/4                                                                        Doctor, please specify diagnosis or diagnoses associated with above clinical findings.    Please clarify the pleural effusion diagnosis:    Provider Use Only        [ x ]  Malignant pleural effusion, due to adenocarcinoma of pulmonary origin      [  ]   Malignant pleural effusion, due to pulmonary adenocarcinoma metastasis      [  ]  Malignant pleural effusion, unknown source/etiology      [  ]  Pleural effusion due to other (specify): _________________      [  ]  Clinically undetermined

## 2018-01-04 NOTE — SUBJECTIVE & OBJECTIVE
Interval History: Still on 6L NC oxygen    Oncology Treatment Plan:   CARBOPLATIN (AUC) + ETOPOSIDE    Medications:  Continuous Infusions:  Scheduled Meds:   albuterol-ipratropium 2.5mg-0.5mg/3mL  3 mL Nebulization Q6H WAKE    buPROPion  300 mg Oral Daily    dexamethasone  4 mg Oral BID    enoxaparin  40 mg Subcutaneous Daily    megestrol  400 mg Oral BID    metoclopramide HCl  10 mg Oral TID AC    morphine  30 mg Oral Q12H    pantoprazole  40 mg Oral Daily    polyethylene glycol  17 g Oral BID    sodium chloride 0.9%  3 mL Intravenous Q8H     PRN Meds:acetaminophen, albuterol-ipratropium 2.5mg-0.5mg/3mL, alteplase, aluminum-magnesium hydroxide-simethicone, bisacodyl, diphenhydrAMINE, heparin, porcine (PF), hydrocodone-acetaminophen 7.5-325mg, HYDROmorphone, HYDROmorphone, lactulose, LORazepam, ondansetron, sodium chloride 0.9%, sodium chloride 0.9%, sodium chloride 0.9%     Review of Systems   Constitutional: Negative for fever and unexpected weight change.   HENT: Negative for mouth sores and nosebleeds.    Respiratory: Positive for shortness of breath. Negative for wheezing.    Cardiovascular: Negative for chest pain and palpitations.   Gastrointestinal: Negative for abdominal pain and vomiting.   Psychiatric/Behavioral: Negative for behavioral problems and confusion.     Objective:     Vital Signs (Most Recent):  Temp: 98.6 °F (37 °C) (01/04/18 1517)  Pulse: (!) 113 (01/04/18 1517)  Resp: 20 (01/04/18 1517)  BP: (!) 154/80 (01/04/18 1517)  SpO2: 98 % (01/04/18 1604) Vital Signs (24h Range):  Temp:  [97.2 °F (36.2 °C)-98.6 °F (37 °C)] 98.6 °F (37 °C)  Pulse:  [] 113  Resp:  [17-22] 20  SpO2:  [95 %-98 %] 98 %  BP: (125-161)/(60-82) 154/80     Weight: 46.4 kg (102 lb 4.7 oz)  Body mass index is 18.12 kg/m².  Body surface area is 1.44 meters squared.      Intake/Output Summary (Last 24 hours) at 01/04/18 1731  Last data filed at 01/04/18 0518   Gross per 24 hour   Intake                0 ml   Output               575 ml   Net             -575 ml       Physical Exam   HENT:   Mouth/Throat: No oropharyngeal exudate.   Neck: Neck supple. No thyromegaly present.   Cardiovascular: Normal rate and regular rhythm.    Pulmonary/Chest: Effort normal. No respiratory distress. She has decreased breath sounds. She has no wheezes. She has rales.   Lymphadenopathy:     She has no cervical adenopathy.   Skin: No bruising and no petechiae noted.   Psychiatric: Cognition and memory are normal.   Vitals reviewed.      Significant Labs:   All pertinent labs from the last 24 hours have been reviewed.    Diagnostic Results:  I have reviewed all pertinent imaging results/findings within the past 24 hours.

## 2018-01-04 NOTE — PT/OT/SLP PROGRESS
Physical Therapy Treatment    Patient Name:  Yen Falcon   MRN:  [id number]    Recommendations:     Discharge Recommendations:  home   Discharge Equipment Recommendations:  (ongoing accessment, but no needs anticipated at this time.)   Barriers to discharge: None    Assessment:     Yen Falcon is a 42 y.o. female admitted with a medical diagnosis of Acute hypoxemic respiratory failure.  She presents with the following impairments/functional limitations:  weakness, impaired endurance, impaired functional mobilty, gait instability, impaired cardiopulmonary response to activity.  Pt demonstrated an increase in ambulation distance today, but required a seated rest between bouts secondary to SOB.  O2 sats remained 95%5 to 96% with gait and 97% at rest on NC O2 @ 6lpm wall unit. Pt would continue to benefit from P.T. To address impairments listed below.    Rehab Prognosis:  Good; patient would benefit from acute skilled PT services to address these deficits and reach maximum level of function.      Recent Surgery: * No surgery found *      Plan:     During this hospitalization, patient to be seen 6 x/week to address the above listed problems via gait training, therapeutic activities, therapeutic exercises  · Plan of Care Expires:  01/28/18   Plan of Care Reviewed with: patient, family    Subjective     Communicated with RN (Chanelle) prior to session.  Patient found supine with HOB elevated upon PT entry to room, agreeable to treatment.        Patient comments:  Pt agreed to tx.  Pt stated that she has been ambulating to and from bathroom during the day on her own.  Pain/Comfort:  · Pain Rating 1: 0/10  · Pain Rating Post-Intervention 1: 0/10    Patients cultural, spiritual, Hinduism conflicts given the current situation: none reported to PT    Objective:     Patient found with: oxygen     General Precautions: Standard, fall, respiratory   Orthopedic Precautions:N/A   Braces:       Functional Mobility:  · Bed  Mobility:     · Rolling Left:  independence  · Scooting: independence and to EOB  · Supine to Sit: modified independence  · Sit to Supine: modified independence  · Transfers:     · Sit to Stand:  stand by assistance with no AD  · Gait: 40ft x 2 with SBA.  Pt ambulated on O2 wall unit @ 6lpm with extended line.  O2 sats were 95% to 96% during gait.  Pt required a seated rest break between bouts secondary to SOB.  Pt was instructed in purse lip breathing, and also instructed to slow zoran down a little to assist with increased endurance without SOB.  · Balance: Good      AM-PAC 6 CLICK MOBILITY  Turning over in bed (including adjusting bedclothes, sheets and blankets)?: 4  Sitting down on and standing up from a chair with arms (e.g., wheelchair, bedside commode, etc.): 4  Moving from lying on back to sitting on the side of the bed?: 4  Moving to and from a bed to a chair (including a wheelchair)?: 3  Need to walk in hospital room?: 3  Climbing 3-5 steps with a railing?: 3  Total Score: 21       Therapeutic Activities and Exercises:   Seated BLE therex: AP, LAQ x 15 reps.  Standing - marching in place x 15 reps with support of one UE (CGA/Keysha) secondary to one bout unsteadiness.  Standing Heel raises x 15 reps with one UE support.  Pt returned to bed supine with HOB elevated.    Patient left supine with all lines intact and call button in reach..    GOALS:    Physical Therapy Goals        Problem: Physical Therapy Goal    Goal Priority Disciplines Outcome Goal Variances Interventions   Physical Therapy Goal     PT/OT, PT Ongoing (interventions implemented as appropriate)     Description:  Goals to be met by: 18     Patient will increase functional independence with mobility by performin. Sit to stand transfer with Donley  2. Bed to chair transfer with Donley  3. Gait  x 150 feet with Donley                       Time Tracking:     PT Received On: 18  PT Start Time: 1405     PT  Stop Time: 1422  PT Total Time (min): 17 min     Billable Minutes: Gait Training 9 and Therapeutic Exercise 8 (charge for 1 unit)    Treatment Type: Treatment  PT/PTA: PTA     PTA Visit Number: 2     Zeinab Dyer PTA  01/04/2018

## 2018-01-04 NOTE — ASSESSMENT & PLAN NOTE
Continue dexamethasone 4 mg bid until she completes radiation therapy to the brain.    S/p first cycle of chemotherapy - 12/27/17. Next cycle due 1/17.    Doing better with morphine and megace.

## 2018-01-05 PROBLEM — Z66 DNR (DO NOT RESUSCITATE): Status: ACTIVE | Noted: 2018-01-05

## 2018-01-05 LAB
ALBUMIN SERPL BCP-MCNC: 2.2 G/DL
ALP SERPL-CCNC: 143 U/L
ALT SERPL W/O P-5'-P-CCNC: 49 U/L
ANION GAP SERPL CALC-SCNC: 6 MMOL/L
AST SERPL-CCNC: 33 U/L
BASOPHILS # BLD AUTO: 0.02 K/UL
BASOPHILS NFR BLD: 0.2 %
BILIRUB SERPL-MCNC: 0.4 MG/DL
BUN SERPL-MCNC: 22 MG/DL
CALCIUM SERPL-MCNC: 8.9 MG/DL
CHLORIDE SERPL-SCNC: 98 MMOL/L
CO2 SERPL-SCNC: 31 MMOL/L
CREAT SERPL-MCNC: 0.6 MG/DL
DIFFERENTIAL METHOD: ABNORMAL
EOSINOPHIL # BLD AUTO: 0 K/UL
EOSINOPHIL NFR BLD: 0.3 %
ERYTHROCYTE [DISTWIDTH] IN BLOOD BY AUTOMATED COUNT: 12.7 %
EST. GFR  (AFRICAN AMERICAN): >60 ML/MIN/1.73 M^2
EST. GFR  (NON AFRICAN AMERICAN): >60 ML/MIN/1.73 M^2
GLUCOSE SERPL-MCNC: 129 MG/DL
HCT VFR BLD AUTO: 32.4 %
HGB BLD-MCNC: 10.2 G/DL
LYMPHOCYTES # BLD AUTO: 1.2 K/UL
LYMPHOCYTES NFR BLD: 10 %
MAGNESIUM SERPL-MCNC: 1.7 MG/DL
MCH RBC QN AUTO: 28.4 PG
MCHC RBC AUTO-ENTMCNC: 31.5 G/DL
MCV RBC AUTO: 90 FL
MONOCYTES # BLD AUTO: 0.4 K/UL
MONOCYTES NFR BLD: 3.3 %
NEUTROPHILS # BLD AUTO: 10.2 K/UL
NEUTROPHILS NFR BLD: 85.1 %
PHOSPHATE SERPL-MCNC: 3.1 MG/DL
PLATELET # BLD AUTO: 138 K/UL
PMV BLD AUTO: 10.5 FL
POTASSIUM SERPL-SCNC: 4.2 MMOL/L
PROT SERPL-MCNC: 5.8 G/DL
RBC # BLD AUTO: 3.59 M/UL
SODIUM SERPL-SCNC: 135 MMOL/L
WBC # BLD AUTO: 11.96 K/UL

## 2018-01-05 PROCEDURE — 25000003 PHARM REV CODE 250: Performed by: HOSPITALIST

## 2018-01-05 PROCEDURE — 85025 COMPLETE CBC W/AUTO DIFF WBC: CPT

## 2018-01-05 PROCEDURE — 25000242 PHARM REV CODE 250 ALT 637 W/ HCPCS: Performed by: HOSPITALIST

## 2018-01-05 PROCEDURE — A4216 STERILE WATER/SALINE, 10 ML: HCPCS | Performed by: EMERGENCY MEDICINE

## 2018-01-05 PROCEDURE — 97802 MEDICAL NUTRITION INDIV IN: CPT

## 2018-01-05 PROCEDURE — 94640 AIRWAY INHALATION TREATMENT: CPT

## 2018-01-05 PROCEDURE — 36415 COLL VENOUS BLD VENIPUNCTURE: CPT

## 2018-01-05 PROCEDURE — S0179 MEGESTROL 20 MG: HCPCS | Performed by: INTERNAL MEDICINE

## 2018-01-05 PROCEDURE — 63600175 PHARM REV CODE 636 W HCPCS: Performed by: HOSPITALIST

## 2018-01-05 PROCEDURE — 27100171 HC OXYGEN HIGH FLOW UP TO 24 HOURS

## 2018-01-05 PROCEDURE — 11000001 HC ACUTE MED/SURG PRIVATE ROOM

## 2018-01-05 PROCEDURE — 84100 ASSAY OF PHOSPHORUS: CPT

## 2018-01-05 PROCEDURE — 25000003 PHARM REV CODE 250: Performed by: INTERNAL MEDICINE

## 2018-01-05 PROCEDURE — 94761 N-INVAS EAR/PLS OXIMETRY MLT: CPT

## 2018-01-05 PROCEDURE — 25000003 PHARM REV CODE 250: Performed by: EMERGENCY MEDICINE

## 2018-01-05 PROCEDURE — 63600175 PHARM REV CODE 636 W HCPCS: Performed by: INTERNAL MEDICINE

## 2018-01-05 PROCEDURE — 80053 COMPREHEN METABOLIC PANEL: CPT

## 2018-01-05 PROCEDURE — 83735 ASSAY OF MAGNESIUM: CPT

## 2018-01-05 RX ORDER — HYDROMORPHONE HYDROCHLORIDE 2 MG/ML
0.5 INJECTION, SOLUTION INTRAMUSCULAR; INTRAVENOUS; SUBCUTANEOUS
Status: DISCONTINUED | OUTPATIENT
Start: 2018-01-05 | End: 2018-01-08

## 2018-01-05 RX ORDER — DEXAMETHASONE SODIUM PHOSPHATE 4 MG/ML
4 INJECTION, SOLUTION INTRA-ARTICULAR; INTRALESIONAL; INTRAMUSCULAR; INTRAVENOUS; SOFT TISSUE EVERY 6 HOURS
Status: DISCONTINUED | OUTPATIENT
Start: 2018-01-06 | End: 2018-01-08 | Stop reason: HOSPADM

## 2018-01-05 RX ORDER — HYDROMORPHONE HYDROCHLORIDE 2 MG/ML
1 INJECTION, SOLUTION INTRAMUSCULAR; INTRAVENOUS; SUBCUTANEOUS EVERY 4 HOURS PRN
Status: DISCONTINUED | OUTPATIENT
Start: 2018-01-05 | End: 2018-01-05

## 2018-01-05 RX ADMIN — IPRATROPIUM BROMIDE AND ALBUTEROL SULFATE 3 ML: .5; 3 SOLUTION RESPIRATORY (INHALATION) at 03:01

## 2018-01-05 RX ADMIN — ENOXAPARIN SODIUM 40 MG: 100 INJECTION SUBCUTANEOUS at 06:01

## 2018-01-05 RX ADMIN — LORAZEPAM 1 MG: 1 TABLET ORAL at 04:01

## 2018-01-05 RX ADMIN — DEXAMETHASONE 4 MG: 4 TABLET ORAL at 08:01

## 2018-01-05 RX ADMIN — MEGESTROL ACETATE 400 MG: 40 SUSPENSION ORAL at 08:01

## 2018-01-05 RX ADMIN — METOCLOPRAMIDE 10 MG: 10 TABLET ORAL at 03:01

## 2018-01-05 RX ADMIN — SODIUM CHLORIDE, PRESERVATIVE FREE 3 ML: 5 INJECTION INTRAVENOUS at 06:01

## 2018-01-05 RX ADMIN — IPRATROPIUM BROMIDE AND ALBUTEROL SULFATE 3 ML: .5; 3 SOLUTION RESPIRATORY (INHALATION) at 07:01

## 2018-01-05 RX ADMIN — PIPERACILLIN SODIUM AND TAZOBACTAM SODIUM 4.5 G: 4; .5 INJECTION, POWDER, LYOPHILIZED, FOR SOLUTION INTRAVENOUS at 10:01

## 2018-01-05 RX ADMIN — HYDROMORPHONE HYDROCHLORIDE 1 MG: 2 INJECTION, SOLUTION INTRAMUSCULAR; INTRAVENOUS; SUBCUTANEOUS at 06:01

## 2018-01-05 RX ADMIN — IPRATROPIUM BROMIDE AND ALBUTEROL SULFATE 3 ML: .5; 3 SOLUTION RESPIRATORY (INHALATION) at 09:01

## 2018-01-05 RX ADMIN — IPRATROPIUM BROMIDE AND ALBUTEROL SULFATE 3 ML: .5; 3 SOLUTION RESPIRATORY (INHALATION) at 02:01

## 2018-01-05 RX ADMIN — HYDROMORPHONE HYDROCHLORIDE 0.5 MG: 2 INJECTION, SOLUTION INTRAMUSCULAR; INTRAVENOUS; SUBCUTANEOUS at 10:01

## 2018-01-05 RX ADMIN — SODIUM CHLORIDE, PRESERVATIVE FREE 3 ML: 5 INJECTION INTRAVENOUS at 02:01

## 2018-01-05 RX ADMIN — METOCLOPRAMIDE 10 MG: 10 TABLET ORAL at 11:01

## 2018-01-05 RX ADMIN — PANTOPRAZOLE SODIUM 40 MG: 40 TABLET, DELAYED RELEASE ORAL at 08:01

## 2018-01-05 RX ADMIN — BUPROPION HYDROCHLORIDE 300 MG: 150 TABLET, FILM COATED, EXTENDED RELEASE ORAL at 08:01

## 2018-01-05 RX ADMIN — HYDROMORPHONE HYDROCHLORIDE 2 MG: 2 TABLET ORAL at 03:01

## 2018-01-05 RX ADMIN — MORPHINE SULFATE 30 MG: 30 TABLET, EXTENDED RELEASE ORAL at 08:01

## 2018-01-05 RX ADMIN — METOCLOPRAMIDE 10 MG: 10 TABLET ORAL at 06:01

## 2018-01-05 NOTE — ASSESSMENT & PLAN NOTE
Multifactorial. Down to HFNC at 6 LPM and good sats. Continue to wean as able. Says that she turns up the oxygen when she is ambulating because she gets SOB. Will check ambulatory sats to see if needs higher oxygen rate.

## 2018-01-05 NOTE — PROGRESS NOTES
"[facility]  Adult Nutrition  Progress Note    SUMMARY     Recommendations    Recommendation/Intervention:   1. Encourage intake as tolerated.   2. Consider nutritional support: TF: Isosource 1.5 at 50ml/hr to provide 1800 kcal, 81g protein, & 934 ml free water OR PPN: Clinimix 4.25/10 at 100ml/hr with daily IVFE to provide 1724 kcal, 102g protein, & 2400ml fluid with GIR of 3.5.     Goals:   TF or PPN will be started within 48 hours  Nutrition Goal Status: new  Communication of RD Recs: reviewed with RN (Blanca)     Continuum of Care Plan  Referral to Outpatient Services:  (pt to d/c on FL diet)    Reason for Assessment  Reason for Assessment: length of stay  Diagnosis: cancer diagnosis/related complications  Relevent Medical History: asthma, malignant neoplasm of L lung      General Information Comments: Pt on Full Liquid diet with Boost Plus. Pt with very poor intake. Pt asleep at visit. Family reports pt c/o "hurts to eat." Denies N/V.    Nutrition Prescription Ordered  Current Diet Order: Full Liquid  Oral Nutrition Supplement: Boost Plus     Evaluation of Received Nutrients/Fluid Intake  % Intake of Estimated Energy Needs: 0 - 25 %  % Meal Intake: 0%     Nutrition Risk Screen   Nutrition Risk Screen: no indicators present    Nutrition/Diet History  Food Preferences: no Bahai or cultural food prefs identified  Factors Affecting Nutritional Intake: decreased appetite, difficulty/impaired swallowing     Labs/Tests/Procedures/Meds  Pertinent Labs Reviewed: reviewed  Pertinent Labs Comments: Na 135L, BUN22H, G;u 129H, Alb 2.2L  Pertinent Medications Reviewed: reviewed  Pertinent Medications Comments: Megace, Reglan    Physical Findings  Overall Physical Appearance: underweight  Tubes:  (none)  Oral/Mouth Cavity: WDL  Skin:  (Larry 21-drain)    Anthropometrics  Temp: 98.8 °F (37.1 °C)  Height: 5' 3" (160 cm)  Weight Method: Bed Scale  Weight: 46.4 kg (102 lb 4.7 oz)  Ideal Body Weight (IBW), " Female: 115 lb  % Ideal Body Weight, Female (lb): 88.95 lb  BMI (Calculated): 18.2  BMI Grade: 17 - 18.4 protein-energy malnutrition grade I    Estimated/Assessed Needs  Weight Used For Calorie Calculations: 52.2 kg (115 lb 1.3 oz) (IBW)   Energy Calorie Requirements (kcal): 1827  Energy Need Method: Kcal/kg (35 kcal/kg)  RMR (Beauregard-St. Jeor Equation): 1156.12  Weight Used For Protein Calculations: 52.2 kg (115 lb 1.3 oz)  Protein Requirements: 63g (1.2 g/kg IBW)  Fluid Need Method: RDA Method  RDA Method (mL): 1827    Assessment and Plan    Moderate protein-calorie malnutrition    Related to (etiology):   cancer    Signs and Symptoms (as evidenced by):   Underweight/poor intake/Alb 2.2L    Interventions/Recommendations (treatment strategy):  See recs    Nutrition Diagnosis Status:   New            Monitor and Evaluation  Food and Nutrient Intake: food and beverage intake  Food and Nutrient Adminstration: diet order, enteral and parenteral nutrition administration  Physical Activity and Function: nutrition-related ADLs and IADLs  Anthropometric Measurements: weight  Biochemical Data, Medical Tests and Procedures: electrolyte and renal panel  Nutrition-Focused Physical Findings: overall appearance    Nutrition Risk  Level of Risk:  (2xweekly)    Nutrition Follow-Up  RD Follow-up?: Yes

## 2018-01-05 NOTE — PROGRESS NOTES
[facility] Hospital Medicine  Progress Note    Patient Name: Yen Falcon  MRN: [id number]  Patient Class: IP- Inpatient   Admission Date: 12/26/2017  Length of Stay: 9 days  Attending Physician: Kristian Chiang MD  Primary Care Provider: Lauren Goodman MD        Subjective:     Principal Problem:Acute hypoxemic respiratory failure    HPI:  Yen Falcon is  42 y.o.  woman (LMP: 11/26/17) with a history of recurrent upper respiratory infection, asthma diagnosed in 2013, smoking that she quit when she was diagnosed with asthma, a left malignant pleural effusion since 10/30/17 due to stage 4 left lung carcinoma.  She is a former smoker, but quit when she was diagnosed with asthma.  She lives in [place], [place].   She was found to have a left pleural effusion at [facility] on 10/30/17 that was subsequently found to be due to metastatic lung cancer after a biopsy of a right axillary lymph node metastasis on 12/18/17.  She was hospitalized at [facility] from 12/22/17 to 12/23/17 after being transferred from [facility] for hypoxia due to the effusion.  Interventional Radiology performed thoracentesis removing 1.25 liters.  She was seen by Pulmonology (Dr. Cheko Batista and Dr. Branden Manzano) who recommended prophylactic enoxaparin.  She was seen by hematologist Dr. Vinnie Quiros to establish care.  She was prescribed home oxygen with expectation for the effusion to reaccumulate.   She returned to [facility] the day after [place] with worsening shortness of breath confirmed to be due to effusion reaccumulation on chest x-ray.   Dr. Foote and Dr. Manzano drained another 1.1 liter and planned inpatient Pleur-X catheter placement, which would not be able to be done for another 2 days due to lack of staff.  She was readmitted to [facility] for this.    Hospital  Course:  She reported constipation since her last hospitalization so was given bisacodyl and polyethylene glycol.  Dr. Batista suspected she had adenocarcinoma and needed platinum based therapy.  Dr. Manzano obtained the biopsy results from [place], which showed that it was poorly differentiated adenocarcinoma.  They placed a PleurX catheter at bedside in the ICU.  After discussion with Dr. Quiros, it was decided she needed inpatient chemotherapy due to rapid progression.  She was started on carboplatin and etoposide.  Brain MRI and CT of the chest/abdomen/pelvis showed extensive metastases including in the brain, with vasogenic edema.  Dr. Quiros started steroids.  She required high flow nasal cannula.  She was transferred out of the ICU on 12/29/17.  She continued to complain of a sensation of food building up in her upper abdomen.  Pulmonology felt her pancreatic metastasis may be involved, and recommended Gastroenterology evaluation.    Interval History: Feeling okay today. Gets more SOB when she is ambulating. Slept well and did not wake up to need any pain medication.     Review of Systems   Constitutional: Negative for chills and fever.   Respiratory: Positive for shortness of breath. Negative for cough.    Cardiovascular: Negative for chest pain and palpitations.   Gastrointestinal: Negative for nausea and vomiting.     Objective:     Vital Signs (Most Recent):  Temp: 98.6 °F (37 °C) (01/04/18 1517)  Pulse: (!) 113 (01/04/18 1517)  Resp: 20 (01/04/18 1517)  BP: (!) 154/80 (01/04/18 1517)  SpO2: 97 % (01/04/18 1436) Vital Signs (24h Range):  Temp:  [97.2 °F (36.2 °C)-98.6 °F (37 °C)] 98.6 °F (37 °C)  Pulse:  [] 113  Resp:  [17-22] 20  SpO2:  [95 %-98 %] 97 %  BP: (125-161)/(60-82) 154/80     Weight: 46.4 kg (102 lb 4.7 oz)  Body mass index is 18.12 kg/m².    Intake/Output Summary (Last 24 hours) at 01/04/18 2969  Last data filed at 01/04/18 0518   Gross per 24 hour   Intake                0 ml    Output              575 ml   Net             -575 ml      Physical Exam  Constitutional: She is oriented to person, place, and time. She appears well-developed. No distress.   Cardiovascular: Normal rate and regular rhythm.    No murmur heard.  Pulmonary/Chest: She is in respiratory distress. She has decreased breath sounds. She has wheezes. She has rhonchi.   Abdominal: Soft. She exhibits no distension. Bowel sounds are decreased. There is tenderness in the epigastric area.   Musculoskeletal: She exhibits no edema.   Neurological: She is alert and oriented to person, place, and time.   Skin: Skin is warm and dry.   Psychiatric: She has a normal mood and affect. Her behavior is normal.   Nursing note and vitals reviewed.      Significant Labs:   CBC:   Recent Labs  Lab 01/03/18  0741 01/04/18  0500   WBC 20.36* 16.86*   HGB 9.9* 9.9*   HCT 31.5* 31.4*    143*     CMP:   Recent Labs  Lab 01/03/18  0741 01/04/18  0500    137   K 4.4 4.3   CL 99 99   CO2 28 30*   * 136*   BUN 22* 22*   CREATININE 0.6 0.6   CALCIUM 8.5* 8.7   PROT 5.5* 5.6*   ALBUMIN 2.1* 2.1*   BILITOT 0.5 0.5   ALKPHOS 142* 148*   AST 30 28   ALT 38 42   ANIONGAP 9 8   EGFRNONAA >60 >60     Magnesium:   Recent Labs  Lab 01/03/18  0741 01/04/18  0500   MG 1.7 1.6     Significant Imaging: I have reviewed all pertinent imaging results/findings within the past 24 hours.    Assessment/Plan:      * Acute hypoxemic respiratory failure    Multifactorial. Down to HFNC at 6 LPM and good sats. Continue to wean as able. Says that she turns up the oxygen when she is ambulating because she gets SOB. Will check ambulatory sats to see if needs higher oxygen rate.         Pleural effusion on left    Admission for chest tube placement  Acute hypoxemic respiratory failure  Malignant neoplasm of left lung stage 4  Malignant neoplasm metastatic to lymph node of axilla  Asthma  Brain metastases with cerebral edema  Continue supplemental oxygen.  Continue HFNC. Continue scheduled nebulizer treatments.  Continue prophylactic enoxaparin. Pulmonology placed Pleur-X catheter.  Started chemotherapy and will receive next round on 1/17/18.  Steroids started for brain metastases, and pantoprazole started due to abdominal complaints.  Give vanilla Boost supplements. Continue dexamethasone to BID.         Moderate protein-calorie malnutrition    Not eating much currently. Started megace 800 mg daily. Continue dexamethasone.           Constipation    Continue polyethylene glycol. Lactulose as needed.         Bilateral pneumonia    Completed moxifloxacin started last admission.          Anxiety    Continue bupropion and lorazepam PRN.            VTE Risk Mitigation         Ordered     heparin, porcine (PF) 100 unit/mL injection flush 500 Units  As needed (PRN)     Route:  Intravenous        12/27/17 1210     enoxaparin injection 40 mg  Daily     Route:  Subcutaneous        12/26/17 2223     Medium Risk of VTE  Once      12/26/17 2223              Kristian Chiang MD  Department of Hospital Medicine   [facility]

## 2018-01-05 NOTE — NURSING
Back in bed. No respiratory distress noted has no c/o pain. Refusing bed alarm to be set. Pt aware of safety protocol still continues to refuse. No distress noted call light in reach will report and round with oncoming nurse.

## 2018-01-05 NOTE — PLAN OF CARE
Problem: Nutrition, Imbalanced: Inadequate Oral Intake (Adult)  Goal: Improved Oral Intake  Patient will demonstrate the desired outcomes by discharge/transition of care.  Outcome: Ongoing (interventions implemented as appropriate)  Recommendation/Intervention:   1. Encourage intake as tolerated.   2. Consider nutritional support: TF: Isosource 1.5 at 50ml/hr to provide 1800 kcal, 81g protein, & 934 ml free water OR PPN: Clinimix 4.25/10 at 100ml/hr with daily IVFE to provide 1724 kcal, 102g protein, & 2400ml fluid with GIR of 3.5.     Goals:   TF or PPN will be started within 48 hours  Nutrition Goal Status: new  Communication of RD Recs: reviewed with RN (Blanca)

## 2018-01-05 NOTE — NURSING
Bedside report received from offgoing nurse received care of pt sitting up in bed aaox3 has no c/o pain/discomfort at this time. Has on o2 6l via n/c. Get sob upon exertion. Decreased breath sounds with exp wheezing noted upon auscultation no respiratory distress noted. Has left chest pleurx  drain clamped drsg cdi. Pulses palpable st 111-114 on telemetry. Active bowel sounds x4 quads. No distress noted call light in reach refused bed alarm to be set will continue to monitor. Sig other at bedside. Left lung sounds more decreased than right.

## 2018-01-05 NOTE — PLAN OF CARE
Problem: Patient Care Overview  Goal: Plan of Care Review  Outcome: Ongoing (interventions implemented as appropriate)  Plan of care discussed with patient.  Verbalized understanding.  Pt is AAOx4.  Bedrest with bedside commode due to extreme dyspnea on exertion.  Pt remained on 8 to 10L of oxygen in order to maintain sats greater than 90%.  Pt frequently c/o difficulty breathing and gets increasingly anxious.  Ativan given PRN for anxiety.  Pt is sitting up in bed with SO at bedside.  Will continue to monitor.

## 2018-01-05 NOTE — NURSING
While ambulating back to bed pt reported getting winded and knelt down on floor stated she does that when she feels winded hr increased to 130 on telemetry. Back in bed, hr 117-124. Pt reinstructed on importance of bed alarm to be set verbalized understanding. Bed alarm set. Call light in reach. Will continue to monitor sig other at bedside. resp thx paged for treatment,

## 2018-01-05 NOTE — PLAN OF CARE
Problem: Patient Care Overview  Goal: Plan of Care Review  Outcome: Ongoing (interventions implemented as appropriate)  Pt AAOX4. VSS. No distress noted. Safety maintained. Will continue to monitor.

## 2018-01-05 NOTE — PROGRESS NOTES
Pt ambulated to restroom with PCT.  Per report of PCT and charge pt became extremely winded and needed to sit.  SpO2 declined and patient brought back to bed and placed on oxygen 8L.  SO states that pt's respiratory status has gotten worse within the last 12 hours.  States that during the night while ambulating to the bathroom pt had to sit on knees on floor.  Pt is sitting up in bed practicing deep breathing.  Will continue to monitor.

## 2018-01-05 NOTE — NURSING
Bed alarm going off, pt sitting on side of bed stated wanted to get out of the same position. Sitting in chair on side of bed call light placed in reach.[id number] via n/c. Has no c/o pain no distress noted  Will report and round with oncoming nurse.

## 2018-01-05 NOTE — PT/OT/SLP PROGRESS
Physical Therapy      Patient Name:  Yen Falcon   MRN:  [id number]    Patient not seen today secondary to not feeling well and having increased SOB.    Zeinab Dyer, PTA

## 2018-01-05 NOTE — ASSESSMENT & PLAN NOTE
Related to (etiology):   cancer    Signs and Symptoms (as evidenced by):   Underweight/poor intake/Alb 2.2L    Interventions/Recommendations (treatment strategy):  See recs    Nutrition Diagnosis Status:   New

## 2018-01-05 NOTE — NURSING
Requesting prn ativan for anxiety medicated with prn ativan as ordered florencio well no distress noted call light in reach refused bed alarm to be set will continue to monitor. Sig other at bedside.

## 2018-01-05 NOTE — PLAN OF CARE
Problem: Patient Care Overview  Goal: Plan of Care Review  Outcome: Ongoing (interventions implemented as appropriate)  Received pt on HF 6L. Tolerated tx well; will cont to monitor

## 2018-01-05 NOTE — NURSING
In bed resting quietly respirations are nonlabored, no distress noted.bed alarm set will continue to monitor sig other at bedside

## 2018-01-05 NOTE — NURSING
In bed awake has no c/o pain/discomfort no sob reported no distress noted call light in reach bed alarm set will continue to monitor

## 2018-01-06 LAB
ALBUMIN SERPL BCP-MCNC: 2.2 G/DL
ALP SERPL-CCNC: 144 U/L
ALT SERPL W/O P-5'-P-CCNC: 52 U/L
ANION GAP SERPL CALC-SCNC: 13 MMOL/L
AST SERPL-CCNC: 33 U/L
BASOPHILS # BLD AUTO: 0.01 K/UL
BASOPHILS NFR BLD: 0.1 %
BILIRUB SERPL-MCNC: 0.5 MG/DL
BUN SERPL-MCNC: 33 MG/DL
CALCIUM SERPL-MCNC: 9.2 MG/DL
CHLORIDE SERPL-SCNC: 99 MMOL/L
CO2 SERPL-SCNC: 30 MMOL/L
CREAT SERPL-MCNC: 0.7 MG/DL
DIFFERENTIAL METHOD: ABNORMAL
EOSINOPHIL # BLD AUTO: 0 K/UL
EOSINOPHIL NFR BLD: 0.4 %
ERYTHROCYTE [DISTWIDTH] IN BLOOD BY AUTOMATED COUNT: 12.8 %
EST. GFR  (AFRICAN AMERICAN): >60 ML/MIN/1.73 M^2
EST. GFR  (NON AFRICAN AMERICAN): >60 ML/MIN/1.73 M^2
GLUCOSE SERPL-MCNC: 129 MG/DL
HCT VFR BLD AUTO: 32.6 %
HGB BLD-MCNC: 10 G/DL
LYMPHOCYTES # BLD AUTO: 0.7 K/UL
LYMPHOCYTES NFR BLD: 7.1 %
MAGNESIUM SERPL-MCNC: 1.9 MG/DL
MCH RBC QN AUTO: 27.9 PG
MCHC RBC AUTO-ENTMCNC: 30.7 G/DL
MCV RBC AUTO: 91 FL
MONOCYTES # BLD AUTO: 0.5 K/UL
MONOCYTES NFR BLD: 4.9 %
NEUTROPHILS # BLD AUTO: 8.5 K/UL
NEUTROPHILS NFR BLD: 86.6 %
PHOSPHATE SERPL-MCNC: 4.2 MG/DL
PLATELET # BLD AUTO: 155 K/UL
PMV BLD AUTO: 10.9 FL
POTASSIUM SERPL-SCNC: 4.9 MMOL/L
PROT SERPL-MCNC: 6.1 G/DL
RBC # BLD AUTO: 3.59 M/UL
SODIUM SERPL-SCNC: 142 MMOL/L
WBC # BLD AUTO: 9.86 K/UL

## 2018-01-06 PROCEDURE — 36415 COLL VENOUS BLD VENIPUNCTURE: CPT

## 2018-01-06 PROCEDURE — A4216 STERILE WATER/SALINE, 10 ML: HCPCS | Performed by: EMERGENCY MEDICINE

## 2018-01-06 PROCEDURE — 25000003 PHARM REV CODE 250: Performed by: INTERNAL MEDICINE

## 2018-01-06 PROCEDURE — 83735 ASSAY OF MAGNESIUM: CPT

## 2018-01-06 PROCEDURE — 85025 COMPLETE CBC W/AUTO DIFF WBC: CPT

## 2018-01-06 PROCEDURE — 25000242 PHARM REV CODE 250 ALT 637 W/ HCPCS: Performed by: HOSPITALIST

## 2018-01-06 PROCEDURE — 11000001 HC ACUTE MED/SURG PRIVATE ROOM

## 2018-01-06 PROCEDURE — 25000003 PHARM REV CODE 250: Performed by: HOSPITALIST

## 2018-01-06 PROCEDURE — 63600175 PHARM REV CODE 636 W HCPCS: Performed by: HOSPITALIST

## 2018-01-06 PROCEDURE — 84100 ASSAY OF PHOSPHORUS: CPT

## 2018-01-06 PROCEDURE — 25000003 PHARM REV CODE 250: Performed by: EMERGENCY MEDICINE

## 2018-01-06 PROCEDURE — 94640 AIRWAY INHALATION TREATMENT: CPT

## 2018-01-06 PROCEDURE — 27100171 HC OXYGEN HIGH FLOW UP TO 24 HOURS

## 2018-01-06 PROCEDURE — S0179 MEGESTROL 20 MG: HCPCS | Performed by: INTERNAL MEDICINE

## 2018-01-06 PROCEDURE — 80053 COMPREHEN METABOLIC PANEL: CPT

## 2018-01-06 PROCEDURE — 94761 N-INVAS EAR/PLS OXIMETRY MLT: CPT

## 2018-01-06 RX ORDER — FENTANYL 25 UG/1
1 PATCH TRANSDERMAL
Status: DISCONTINUED | OUTPATIENT
Start: 2018-01-06 | End: 2018-01-06

## 2018-01-06 RX ORDER — HYOSCYAMINE SULFATE 0.5 MG/ML
0.25 INJECTION, SOLUTION SUBCUTANEOUS EVERY 4 HOURS PRN
Status: DISCONTINUED | OUTPATIENT
Start: 2018-01-06 | End: 2018-01-08 | Stop reason: HOSPADM

## 2018-01-06 RX ORDER — DIAZEPAM 10 MG/2ML
2 INJECTION INTRAMUSCULAR ONCE
Status: COMPLETED | OUTPATIENT
Start: 2018-01-06 | End: 2018-01-06

## 2018-01-06 RX ORDER — DIAZEPAM 10 MG/2ML
2 INJECTION INTRAMUSCULAR EVERY 4 HOURS PRN
Status: DISCONTINUED | OUTPATIENT
Start: 2018-01-06 | End: 2018-01-07

## 2018-01-06 RX ORDER — IPRATROPIUM BROMIDE AND ALBUTEROL SULFATE 2.5; .5 MG/3ML; MG/3ML
3 SOLUTION RESPIRATORY (INHALATION)
Status: DISCONTINUED | OUTPATIENT
Start: 2018-01-06 | End: 2018-01-08

## 2018-01-06 RX ADMIN — POLYETHYLENE GLYCOL 3350 17 G: 17 POWDER, FOR SOLUTION ORAL at 08:01

## 2018-01-06 RX ADMIN — HYDROMORPHONE HYDROCHLORIDE 0.5 MG: 2 INJECTION, SOLUTION INTRAMUSCULAR; INTRAVENOUS; SUBCUTANEOUS at 12:01

## 2018-01-06 RX ADMIN — IPRATROPIUM BROMIDE AND ALBUTEROL SULFATE 3 ML: .5; 3 SOLUTION RESPIRATORY (INHALATION) at 11:01

## 2018-01-06 RX ADMIN — HYDROMORPHONE HYDROCHLORIDE 0.5 MG: 2 INJECTION, SOLUTION INTRAMUSCULAR; INTRAVENOUS; SUBCUTANEOUS at 02:01

## 2018-01-06 RX ADMIN — HYDROMORPHONE HYDROCHLORIDE 0.5 MG: 2 INJECTION, SOLUTION INTRAMUSCULAR; INTRAVENOUS; SUBCUTANEOUS at 08:01

## 2018-01-06 RX ADMIN — DEXAMETHASONE SODIUM PHOSPHATE 4 MG: 4 INJECTION, SOLUTION INTRAMUSCULAR; INTRAVENOUS at 06:01

## 2018-01-06 RX ADMIN — MEGESTROL ACETATE 400 MG: 40 SUSPENSION ORAL at 08:01

## 2018-01-06 RX ADMIN — ENOXAPARIN SODIUM 40 MG: 100 INJECTION SUBCUTANEOUS at 06:01

## 2018-01-06 RX ADMIN — HYDROMORPHONE HYDROCHLORIDE 0.5 MG: 2 INJECTION, SOLUTION INTRAMUSCULAR; INTRAVENOUS; SUBCUTANEOUS at 07:01

## 2018-01-06 RX ADMIN — HYDROMORPHONE HYDROCHLORIDE 0.5 MG: 2 INJECTION, SOLUTION INTRAMUSCULAR; INTRAVENOUS; SUBCUTANEOUS at 05:01

## 2018-01-06 RX ADMIN — DIAZEPAM 2 MG: 5 INJECTION, SOLUTION INTRAMUSCULAR; INTRAVENOUS at 04:01

## 2018-01-06 RX ADMIN — PIPERACILLIN SODIUM AND TAZOBACTAM SODIUM 4.5 G: 4; .5 INJECTION, POWDER, LYOPHILIZED, FOR SOLUTION INTRAVENOUS at 05:01

## 2018-01-06 RX ADMIN — DEXAMETHASONE SODIUM PHOSPHATE 4 MG: 4 INJECTION, SOLUTION INTRAMUSCULAR; INTRAVENOUS at 12:01

## 2018-01-06 RX ADMIN — SODIUM CHLORIDE, PRESERVATIVE FREE 3 ML: 5 INJECTION INTRAVENOUS at 02:01

## 2018-01-06 RX ADMIN — PIPERACILLIN SODIUM AND TAZOBACTAM SODIUM 4.5 G: 4; .5 INJECTION, POWDER, LYOPHILIZED, FOR SOLUTION INTRAVENOUS at 02:01

## 2018-01-06 RX ADMIN — MORPHINE SULFATE 30 MG: 30 TABLET, EXTENDED RELEASE ORAL at 08:01

## 2018-01-06 RX ADMIN — IPRATROPIUM BROMIDE AND ALBUTEROL SULFATE 3 ML: .5; 3 SOLUTION RESPIRATORY (INHALATION) at 07:01

## 2018-01-06 RX ADMIN — PANTOPRAZOLE SODIUM 40 MG: 40 TABLET, DELAYED RELEASE ORAL at 08:01

## 2018-01-06 RX ADMIN — SODIUM CHLORIDE, PRESERVATIVE FREE 3 ML: 5 INJECTION INTRAVENOUS at 06:01

## 2018-01-06 RX ADMIN — METOCLOPRAMIDE 10 MG: 10 TABLET ORAL at 02:01

## 2018-01-06 RX ADMIN — METOCLOPRAMIDE 10 MG: 10 TABLET ORAL at 08:01

## 2018-01-06 RX ADMIN — BUPROPION HYDROCHLORIDE 300 MG: 150 TABLET, FILM COATED, EXTENDED RELEASE ORAL at 08:01

## 2018-01-06 RX ADMIN — HYDROMORPHONE HYDROCHLORIDE 0.5 MG: 2 INJECTION, SOLUTION INTRAMUSCULAR; INTRAVENOUS; SUBCUTANEOUS at 04:01

## 2018-01-06 RX ADMIN — IPRATROPIUM BROMIDE AND ALBUTEROL SULFATE 3 ML: .5; 3 SOLUTION RESPIRATORY (INHALATION) at 02:01

## 2018-01-06 RX ADMIN — PIPERACILLIN SODIUM AND TAZOBACTAM SODIUM 4.5 G: 4; .5 INJECTION, POWDER, LYOPHILIZED, FOR SOLUTION INTRAVENOUS at 09:01

## 2018-01-06 RX ADMIN — DIAZEPAM 2 MG: 5 INJECTION, SOLUTION INTRAMUSCULAR; INTRAVENOUS at 08:01

## 2018-01-06 RX ADMIN — DEXAMETHASONE SODIUM PHOSPHATE 4 MG: 4 INJECTION, SOLUTION INTRAMUSCULAR; INTRAVENOUS at 11:01

## 2018-01-06 RX ADMIN — IPRATROPIUM BROMIDE AND ALBUTEROL SULFATE 3 ML: .5; 3 SOLUTION RESPIRATORY (INHALATION) at 12:01

## 2018-01-06 RX ADMIN — HYDROMORPHONE HYDROCHLORIDE 0.5 MG: 2 INJECTION, SOLUTION INTRAMUSCULAR; INTRAVENOUS; SUBCUTANEOUS at 10:01

## 2018-01-06 RX ADMIN — DEXAMETHASONE SODIUM PHOSPHATE 4 MG: 4 INJECTION, SOLUTION INTRAMUSCULAR; INTRAVENOUS at 05:01

## 2018-01-06 RX ADMIN — HYDROMORPHONE HYDROCHLORIDE 0.5 MG: 2 INJECTION, SOLUTION INTRAMUSCULAR; INTRAVENOUS; SUBCUTANEOUS at 11:01

## 2018-01-06 NOTE — PLAN OF CARE
Problem: Patient Care Overview  Goal: Plan of Care Review  Outcome: Ongoing (interventions implemented as appropriate)  Family in continued attendance    Dr alarcon sat and spoke to pt and family for several minutes on rounds today    Lungs coarse   Pt expectorating small volumes of dark material  Oxygen continued as documented per respiratory   Afebrile  Self repositions often   Episodes of agitation medicated for pain or anxiety as pt requests  Safety maintained   Appetite poor     Voided and small bm  This morning  None this afternoon

## 2018-01-06 NOTE — PLAN OF CARE
Problem: Patient Care Overview  Goal: Plan of Care Review  Outcome: Ongoing (interventions implemented as appropriate)  Pt complains of 10/10 pain. PRN medication given q2, pt slightly relieved. Mother and aunt at the bedside at this time. Pt on 15L high flow O2, saturating at 97%. Pt more alert. Safety maintained. Will continue to monitor.

## 2018-01-06 NOTE — PLAN OF CARE
Spoke with Dr. Chiang , pt will be on comfort measure. DNR in the chart and signed. Will continue to monitor.

## 2018-01-06 NOTE — ASSESSMENT & PLAN NOTE
Multifactorial. Having more SOB/MAIER today. CXR today is generally unchanged. Likely some anxiety contributing to her symptoms. Had to go up on the supplemental oxygen today.

## 2018-01-06 NOTE — PLAN OF CARE
Problem: Patient Care Overview  Goal: Plan of Care Review  Outcome: Ongoing (interventions implemented as appropriate)  PATIENT IS CURRENTLY ON A HFNC OF 10 LPM. SPO2 IS 93%. Will continue to monitor.

## 2018-01-06 NOTE — PROGRESS NOTES
PATIENT DOES NOT WANT TO WEAR COMFORT FLOW.   SHE IS WEARING A 12 LPM HFNC. SPO2 IS 92%.     ASKED DOCTOR ARPIT IF HE WOULD LIKE AN ABG- HE SAID NOT AT THIS TIME BUT THANK YOU AND NO TO SUCTIONING.    Dr. MUNSON IS AT THE BS WITH THE FAMILY.     SPOKEN WITH NURSE- PATIENT IS NOW A DNR. COMFORT MEASURES INITIATED.   Will continue to monitor.

## 2018-01-06 NOTE — ASSESSMENT & PLAN NOTE
Admission for chest tube placement  Acute hypoxemic respiratory failure  Malignant neoplasm of left lung stage 4  Malignant neoplasm metastatic to lymph node of axilla  Asthma  Brain metastases with cerebral edema  Continue supplemental oxygen. Continue HFNC. Continue scheduled nebulizer treatments.  Continue prophylactic enoxaparin. Pulmonology placed Pleur-X catheter.  Started chemotherapy and will receive next round on 1/17/18.  Steroids started for brain metastases, and pantoprazole started due to abdominal complaints.  Give vanilla Boost supplements. Continue dexamethasone to BID. Looks a little worse today. Continue to monitor. Further conversations about goals of care.

## 2018-01-06 NOTE — PLAN OF CARE
"Pt respirations labored. Complains of not being able to breathe. Pt on 12L high flow O2. Pt extremely sedated. Arouses to voice and touch. Unable to fully follow commands due to sedative affect. Unable to stay awake in conversation. YVETTE Boykin notified would like to place pt on comfort O2. Would like me to "keep her posted, shes unaware when or if she'll be able to see pt due to having 5 admits." Lucretia suggests that if respiratory hears upper respiratory rattles, pt needs to be NT suctioned. Will continue to monitor pt.  "

## 2018-01-06 NOTE — PT/OT/SLP PROGRESS
Physical Therapy      Patient Name:  Yen Falcon   MRN:  [id number]    Patient not seen today secondary to family and pt requesting rest at this time. Will follow-up on Monday, January 8, 2018    Isabell Burleson PTA

## 2018-01-06 NOTE — SUBJECTIVE & OBJECTIVE
Interval History: Having increased SOB since walking to [place] this AM. No chest pain. No increase in cough. Oxygen turned up and is feeling a little better.     Review of Systems   Constitutional: Negative for chills and fever.   Respiratory: Positive for shortness of breath. Negative for cough.    Cardiovascular: Negative for chest pain and palpitations.   Gastrointestinal: Positive for nausea. Negative for vomiting.     Objective:     Vital Signs (Most Recent):  Temp: 98.1 °F (36.7 °C) (01/05/18 1751)  Pulse: 108 (01/05/18 1751)  Resp: (!) 26 (01/05/18 1751)  BP: (!) 133/98 (01/05/18 1751)  SpO2: 96 % (01/05/18 1751) Vital Signs (24h Range):  Temp:  [97.9 °F (36.6 °C)-99 °F (37.2 °C)] 98.1 °F (36.7 °C)  Pulse:  [] 108  Resp:  [18-28] 26  SpO2:  [94 %-100 %] 96 %  BP: (130-157)/(79-98) 133/98     Weight: 46.4 kg (102 lb 4.7 oz)  Body mass index is 18.12 kg/m².    Intake/Output Summary (Last 24 hours) at 01/05/18 1817  Last data filed at 01/05/18 1000   Gross per 24 hour   Intake              240 ml   Output              500 ml   Net             -260 ml      Physical Exam   Constitutional: She is oriented to person, place, and time. She appears well-developed. No distress.   Cardiovascular: Normal rate and regular rhythm.    No murmur heard.  Pulmonary/Chest: She is in respiratory distress. She has decreased breath sounds. She has wheezes. She has rhonchi.   Abdominal: Soft. She exhibits no distension. Bowel sounds are decreased. There is tenderness in the epigastric area.   Musculoskeletal: She exhibits no edema.   Neurological: She is alert and oriented to person, place, and time.   Skin: Skin is warm and dry.   Psychiatric: She has a normal mood and affect. Her behavior is normal.   Nursing note and vitals reviewed.      Significant Labs:   CBC:   Recent Labs  Lab 01/04/18  0500 01/05/18  0731   WBC 16.86* 11.96   HGB 9.9* 10.2*   HCT 31.4* 32.4*   * 138*     CMP:   Recent Labs  Lab 01/04/18  7143  01/05/18  0730    135*   K 4.3 4.2   CL 99 98   CO2 30* 31*   * 129*   BUN 22* 22*   CREATININE 0.6 0.6   CALCIUM 8.7 8.9   PROT 5.6* 5.8*   ALBUMIN 2.1* 2.2*   BILITOT 0.5 0.4   ALKPHOS 148* 143*   AST 28 33   ALT 42 49*   ANIONGAP 8 6*   EGFRNONAA >60 >60     Magnesium:   Recent Labs  Lab 01/04/18  0500 01/05/18  0731   MG 1.6 1.7       Significant Imaging: CXR: I have reviewed all pertinent results/findings within the past 24 hours and my personal findings are:  Stable bilateral infiltrate

## 2018-01-06 NOTE — PROGRESS NOTES
[facility] Hospital Medicine  Progress Note    Patient Name: Yen Falcon  MRN: [id number]  Patient Class: IP- Inpatient   Admission Date: 12/26/2017  Length of Stay: 10 days  Attending Physician: Kristian Chiang MD  Primary Care Provider: Lauren Goodman MD        Subjective:     Principal Problem:Acute hypoxemic respiratory failure    HPI:  Yen Falcon is  42 y.o.  woman (LMP: 11/26/17) with a history of recurrent upper respiratory infection, asthma diagnosed in 2013, smoking that she quit when she was diagnosed with asthma, a left malignant pleural effusion since 10/30/17 due to stage 4 left lung carcinoma.  She is a former smoker, but quit when she was diagnosed with asthma.  She lives in [place], [place].   She was found to have a left pleural effusion at [facility] on 10/30/17 that was subsequently found to be due to metastatic lung cancer after a biopsy of a right axillary lymph node metastasis on 12/18/17.  She was hospitalized at [facility] from 12/22/17 to 12/23/17 after being transferred from [facility] for hypoxia due to the effusion.  Interventional Radiology performed thoracentesis removing 1.25 liters.  She was seen by Pulmonology (Dr. Cheko Batista and Dr. Branden Manzano) who recommended prophylactic enoxaparin.  She was seen by hematologist Dr. Vninie Quiros to establish care.  She was prescribed home oxygen with expectation for the effusion to reaccumulate.   She returned to [facility] the day after [facility] with worsening shortness of breath confirmed to be due to effusion reaccumulation on chest x-ray.   Dr. Foote and Dr. Manzano drained another 1.1 liter and planned inpatient Pleur-X catheter placement, which would not be able to be done for another 2 days due to lack of staff.  She was readmitted to [facility] for this.    Hospital  Course:  She reported constipation since her last hospitalization so was given bisacodyl and polyethylene glycol.  Dr. Batista suspected she had adenocarcinoma and needed platinum based therapy.  Dr. Manzano obtained the biopsy results from [place], which showed that it was poorly differentiated adenocarcinoma.  They placed a PleurX catheter at bedside in the ICU.  After discussion with Dr. Quiros, it was decided she needed inpatient chemotherapy due to rapid progression.  She was started on carboplatin and etoposide.  Brain MRI and CT of the chest/abdomen/pelvis showed extensive metastases including in the brain, with vasogenic edema.  Dr. Quirso started steroids.  She required high flow nasal cannula.  She was transferred out of the ICU on 12/29/17.  She continued to complain of a sensation of food building up in her upper abdomen.  Pulmonology felt her pancreatic metastasis may be involved, and recommended Gastroenterology evaluation.    Interval History: Having increased SOB since walking to [place] this AM. No chest pain. No increase in cough. Oxygen turned up and is feeling a little better.     Review of Systems   Constitutional: Negative for chills and fever.   Respiratory: Positive for shortness of breath. Negative for cough.    Cardiovascular: Negative for chest pain and palpitations.   Gastrointestinal: Positive for nausea. Negative for vomiting.     Objective:     Vital Signs (Most Recent):  Temp: 98.1 °F (36.7 °C) (01/05/18 1751)  Pulse: 108 (01/05/18 1751)  Resp: (!) 26 (01/05/18 1751)  BP: (!) 133/98 (01/05/18 1751)  SpO2: 96 % (01/05/18 1751) Vital Signs (24h Range):  Temp:  [97.9 °F (36.6 °C)-99 °F (37.2 °C)] 98.1 °F (36.7 °C)  Pulse:  [] 108  Resp:  [18-28] 26  SpO2:  [94 %-100 %] 96 %  BP: (130-157)/(79-98) 133/98     Weight: 46.4 kg (102 lb 4.7 oz)  Body mass index is 18.12 kg/m².    Intake/Output Summary (Last 24 hours) at 01/05/18 1817  Last data filed at 01/05/18 1000   Gross per 24  hour   Intake              240 ml   Output              500 ml   Net             -260 ml      Physical Exam   Constitutional: She is oriented to person, place, and time. She appears well-developed. No distress.   Cardiovascular: Normal rate and regular rhythm.    No murmur heard.  Pulmonary/Chest: She is in respiratory distress. She has decreased breath sounds. She has wheezes. She has rhonchi.   Abdominal: Soft. She exhibits no distension. Bowel sounds are decreased. There is tenderness in the epigastric area.   Musculoskeletal: She exhibits no edema.   Neurological: She is alert and oriented to person, place, and time.   Skin: Skin is warm and dry.   Psychiatric: She has a normal mood and affect. Her behavior is normal.   Nursing note and vitals reviewed.      Significant Labs:   CBC:   Recent Labs  Lab 01/04/18  0500 01/05/18  0731   WBC 16.86* 11.96   HGB 9.9* 10.2*   HCT 31.4* 32.4*   * 138*     CMP:   Recent Labs  Lab 01/04/18  0500 01/05/18  0730    135*   K 4.3 4.2   CL 99 98   CO2 30* 31*   * 129*   BUN 22* 22*   CREATININE 0.6 0.6   CALCIUM 8.7 8.9   PROT 5.6* 5.8*   ALBUMIN 2.1* 2.2*   BILITOT 0.5 0.4   ALKPHOS 148* 143*   AST 28 33   ALT 42 49*   ANIONGAP 8 6*   EGFRNONAA >60 >60     Magnesium:   Recent Labs  Lab 01/04/18  0500 01/05/18  0731   MG 1.6 1.7       Significant Imaging: CXR: I have reviewed all pertinent results/findings within the past 24 hours and my personal findings are:  Stable bilateral infiltrate    Assessment/Plan:      * Acute hypoxemic respiratory failure    Multifactorial. Having more SOB/MAIER today. CXR today is generally unchanged. Likely some anxiety contributing to her symptoms. Had to go up on the supplemental oxygen today.        Pleural effusion on left    Admission for chest tube placement  Acute hypoxemic respiratory failure  Malignant neoplasm of left lung stage 4  Malignant neoplasm metastatic to lymph node of axilla  Asthma  Brain metastases with  cerebral edema  Continue supplemental oxygen. Continue HFNC. Continue scheduled nebulizer treatments.  Continue prophylactic enoxaparin. Pulmonology placed Pleur-X catheter.  Started chemotherapy and will receive next round on 1/17/18.  Steroids started for brain metastases, and pantoprazole started due to abdominal complaints.  Give vanilla Boost supplements. Continue dexamethasone to BID. Looks a little worse today. Continue to monitor. Further conversations about goals of care.         Moderate protein-calorie malnutrition    Not eating much currently. Started megace 800 mg daily. Continue dexamethasone.           Constipation    Continue polyethylene glycol. Lactulose as needed.         Bilateral pneumonia    Completed moxifloxacin started last admission.WBC is down to 11.96 today. Continue to monitor.        Anxiety    Continue bupropion and lorazepam PRN.            VTE Risk Mitigation         Ordered     heparin, porcine (PF) 100 unit/mL injection flush 500 Units  As needed (PRN)     Route:  Intravenous        12/27/17 1210     enoxaparin injection 40 mg  Daily     Route:  Subcutaneous        12/26/17 2223     Medium Risk of VTE  Once      12/26/17 2223              Kristian Chiang MD  Department of Hospital Medicine   [facility]

## 2018-01-06 NOTE — PROGRESS NOTES
Ms. Falcon continued to have increased work of breathing over the day. She is not necessarily more hypoxic and has denied any chest pain. CXR earlier was unchanged. I reviewed her films with her family this evening. She had increased anxiety and SOB just a short time ago and was seeming to have more difficulty breathing. When I evaluated her, she was using some accessory muscles, but was calm and trying to sleep in the bed. I discussed with her mom, dad, ex-, and boyfriend her current situation and possibly that she is just getting worse from the cancer at this time. They expressed that understanding and said that they had discussed previously with Yen that she would not want to be resuscitated or have intubation and mechanical ventilation. So she will be a DNR. We discussed that we will add back some antibiotics and increase the dexamethasone to 4 mg IV q6 hrs again. If she does not make a turn around more in the next day or so, then we will go fully toward comfort care at this time. Tonight, we will increase frequency of dilaudid but reduce the dose to help with comfort for her. If needed, will place onto comfort flow oxygen, but will not do BiPAP or intubation.     Critical Care Time: 30 Minutes of critical care time was spent in the care of the patient. This included management of organ failures related to critical illness, titration of continuous infusions, review of pertinent labs and imaging studies, discussion of the patient with consulting services, and discussion of the patients condition with the patient and family.     Kristian Chiang MD, [facility]  Staff Hospitalist  Pager: [phone]  Spectralink: [phone]

## 2018-01-07 LAB
ALBUMIN SERPL BCP-MCNC: 2.2 G/DL
ALP SERPL-CCNC: 142 U/L
ALT SERPL W/O P-5'-P-CCNC: 44 U/L
ANION GAP SERPL CALC-SCNC: 11 MMOL/L
AST SERPL-CCNC: 29 U/L
BASOPHILS # BLD AUTO: 0 K/UL
BASOPHILS NFR BLD: 0 %
BILIRUB SERPL-MCNC: 0.4 MG/DL
BUN SERPL-MCNC: 35 MG/DL
CALCIUM SERPL-MCNC: 9.1 MG/DL
CHLORIDE SERPL-SCNC: 101 MMOL/L
CO2 SERPL-SCNC: 31 MMOL/L
CREAT SERPL-MCNC: 0.7 MG/DL
DIFFERENTIAL METHOD: ABNORMAL
EOSINOPHIL # BLD AUTO: 0 K/UL
EOSINOPHIL NFR BLD: 0 %
ERYTHROCYTE [DISTWIDTH] IN BLOOD BY AUTOMATED COUNT: 12.9 %
EST. GFR  (AFRICAN AMERICAN): >60 ML/MIN/1.73 M^2
EST. GFR  (NON AFRICAN AMERICAN): >60 ML/MIN/1.73 M^2
GLUCOSE SERPL-MCNC: 147 MG/DL
HCT VFR BLD AUTO: 32.6 %
HGB BLD-MCNC: 9.9 G/DL
LYMPHOCYTES # BLD AUTO: 0.8 K/UL
LYMPHOCYTES NFR BLD: 8.7 %
MAGNESIUM SERPL-MCNC: 1.9 MG/DL
MCH RBC QN AUTO: 27.7 PG
MCHC RBC AUTO-ENTMCNC: 30.4 G/DL
MCV RBC AUTO: 91 FL
MONOCYTES # BLD AUTO: 0.5 K/UL
MONOCYTES NFR BLD: 5.5 %
NEUTROPHILS # BLD AUTO: 7.4 K/UL
NEUTROPHILS NFR BLD: 85.2 %
PHOSPHATE SERPL-MCNC: 3.1 MG/DL
PLATELET # BLD AUTO: 189 K/UL
PMV BLD AUTO: 11 FL
POTASSIUM SERPL-SCNC: 4.5 MMOL/L
PROT SERPL-MCNC: 6.1 G/DL
RBC # BLD AUTO: 3.58 M/UL
SODIUM SERPL-SCNC: 143 MMOL/L
WBC # BLD AUTO: 8.62 K/UL

## 2018-01-07 PROCEDURE — 25000003 PHARM REV CODE 250: Performed by: INTERNAL MEDICINE

## 2018-01-07 PROCEDURE — S0179 MEGESTROL 20 MG: HCPCS | Performed by: INTERNAL MEDICINE

## 2018-01-07 PROCEDURE — 83735 ASSAY OF MAGNESIUM: CPT

## 2018-01-07 PROCEDURE — 80053 COMPREHEN METABOLIC PANEL: CPT

## 2018-01-07 PROCEDURE — 36415 COLL VENOUS BLD VENIPUNCTURE: CPT

## 2018-01-07 PROCEDURE — 27100092 HC HIGH FLOW DELIVERY CANNULA

## 2018-01-07 PROCEDURE — 84100 ASSAY OF PHOSPHORUS: CPT

## 2018-01-07 PROCEDURE — 63600175 PHARM REV CODE 636 W HCPCS: Performed by: HOSPITALIST

## 2018-01-07 PROCEDURE — 85025 COMPLETE CBC W/AUTO DIFF WBC: CPT

## 2018-01-07 PROCEDURE — 25000242 PHARM REV CODE 250 ALT 637 W/ HCPCS: Performed by: HOSPITALIST

## 2018-01-07 PROCEDURE — 63600175 PHARM REV CODE 636 W HCPCS: Performed by: NURSE PRACTITIONER

## 2018-01-07 PROCEDURE — 94761 N-INVAS EAR/PLS OXIMETRY MLT: CPT

## 2018-01-07 PROCEDURE — 94640 AIRWAY INHALATION TREATMENT: CPT

## 2018-01-07 PROCEDURE — 27100171 HC OXYGEN HIGH FLOW UP TO 24 HOURS

## 2018-01-07 PROCEDURE — 11000001 HC ACUTE MED/SURG PRIVATE ROOM

## 2018-01-07 PROCEDURE — 25000003 PHARM REV CODE 250: Performed by: HOSPITALIST

## 2018-01-07 RX ORDER — FLUMAZENIL 0.1 MG/ML
INJECTION INTRAVENOUS
Status: DISPENSED
Start: 2018-01-07 | End: 2018-01-07

## 2018-01-07 RX ORDER — LORAZEPAM 2 MG/ML
1 INJECTION INTRAMUSCULAR EVERY 4 HOURS PRN
Status: DISCONTINUED | OUTPATIENT
Start: 2018-01-07 | End: 2018-01-08 | Stop reason: HOSPADM

## 2018-01-07 RX ORDER — FLUMAZENIL 0.1 MG/ML
0.2 INJECTION INTRAVENOUS ONCE
Status: DISCONTINUED | OUTPATIENT
Start: 2018-01-07 | End: 2018-01-08

## 2018-01-07 RX ORDER — DIAZEPAM 10 MG/2ML
5 INJECTION INTRAMUSCULAR ONCE
Status: COMPLETED | OUTPATIENT
Start: 2018-01-07 | End: 2018-01-07

## 2018-01-07 RX ADMIN — HYDROMORPHONE HYDROCHLORIDE 0.5 MG: 2 INJECTION, SOLUTION INTRAMUSCULAR; INTRAVENOUS; SUBCUTANEOUS at 03:01

## 2018-01-07 RX ADMIN — DIAZEPAM 2 MG: 5 INJECTION, SOLUTION INTRAMUSCULAR; INTRAVENOUS at 02:01

## 2018-01-07 RX ADMIN — HYDROMORPHONE HYDROCHLORIDE 0.5 MG: 2 INJECTION, SOLUTION INTRAMUSCULAR; INTRAVENOUS; SUBCUTANEOUS at 05:01

## 2018-01-07 RX ADMIN — DEXAMETHASONE SODIUM PHOSPHATE 4 MG: 4 INJECTION, SOLUTION INTRAMUSCULAR; INTRAVENOUS at 12:01

## 2018-01-07 RX ADMIN — IPRATROPIUM BROMIDE AND ALBUTEROL SULFATE 3 ML: .5; 3 SOLUTION RESPIRATORY (INHALATION) at 02:01

## 2018-01-07 RX ADMIN — DEXAMETHASONE SODIUM PHOSPHATE 4 MG: 4 INJECTION, SOLUTION INTRAMUSCULAR; INTRAVENOUS at 05:01

## 2018-01-07 RX ADMIN — DIAZEPAM 5 MG: 5 INJECTION, SOLUTION INTRAMUSCULAR; INTRAVENOUS at 08:01

## 2018-01-07 RX ADMIN — LORAZEPAM 1 MG: 2 INJECTION INTRAMUSCULAR; INTRAVENOUS at 09:01

## 2018-01-07 RX ADMIN — IPRATROPIUM BROMIDE AND ALBUTEROL SULFATE 3 ML: .5; 3 SOLUTION RESPIRATORY (INHALATION) at 12:01

## 2018-01-07 RX ADMIN — ENOXAPARIN SODIUM 40 MG: 100 INJECTION SUBCUTANEOUS at 05:01

## 2018-01-07 RX ADMIN — HYDROMORPHONE HYDROCHLORIDE 0.5 MG: 2 INJECTION, SOLUTION INTRAMUSCULAR; INTRAVENOUS; SUBCUTANEOUS at 08:01

## 2018-01-07 RX ADMIN — HYDROMORPHONE HYDROCHLORIDE 0.5 MG: 2 INJECTION, SOLUTION INTRAMUSCULAR; INTRAVENOUS; SUBCUTANEOUS at 01:01

## 2018-01-07 RX ADMIN — MEGESTROL ACETATE 400 MG: 40 SUSPENSION ORAL at 08:01

## 2018-01-07 RX ADMIN — BUPROPION HYDROCHLORIDE 300 MG: 150 TABLET, FILM COATED, EXTENDED RELEASE ORAL at 08:01

## 2018-01-07 RX ADMIN — DIAZEPAM 2 MG: 5 INJECTION, SOLUTION INTRAMUSCULAR; INTRAVENOUS at 06:01

## 2018-01-07 RX ADMIN — IPRATROPIUM BROMIDE AND ALBUTEROL SULFATE 3 ML: .5; 3 SOLUTION RESPIRATORY (INHALATION) at 11:01

## 2018-01-07 RX ADMIN — HYDROMORPHONE HYDROCHLORIDE 0.5 MG: 2 INJECTION, SOLUTION INTRAMUSCULAR; INTRAVENOUS; SUBCUTANEOUS at 11:01

## 2018-01-07 RX ADMIN — DIAZEPAM 2 MG: 5 INJECTION, SOLUTION INTRAMUSCULAR; INTRAVENOUS at 12:01

## 2018-01-07 RX ADMIN — POLYETHYLENE GLYCOL 3350 17 G: 17 POWDER, FOR SOLUTION ORAL at 08:01

## 2018-01-07 RX ADMIN — PIPERACILLIN SODIUM AND TAZOBACTAM SODIUM 4.5 G: 4; .5 INJECTION, POWDER, LYOPHILIZED, FOR SOLUTION INTRAVENOUS at 05:01

## 2018-01-07 RX ADMIN — HYDROMORPHONE HYDROCHLORIDE 0.5 MG: 2 INJECTION, SOLUTION INTRAMUSCULAR; INTRAVENOUS; SUBCUTANEOUS at 02:01

## 2018-01-07 RX ADMIN — IPRATROPIUM BROMIDE AND ALBUTEROL SULFATE 3 ML: .5; 3 SOLUTION RESPIRATORY (INHALATION) at 08:01

## 2018-01-07 RX ADMIN — MORPHINE SULFATE 30 MG: 30 TABLET, EXTENDED RELEASE ORAL at 08:01

## 2018-01-07 RX ADMIN — IPRATROPIUM BROMIDE AND ALBUTEROL SULFATE 3 ML: .5; 3 SOLUTION RESPIRATORY (INHALATION) at 03:01

## 2018-01-07 RX ADMIN — HYDROMORPHONE HYDROCHLORIDE 0.5 MG: 2 INJECTION, SOLUTION INTRAMUSCULAR; INTRAVENOUS; SUBCUTANEOUS at 07:01

## 2018-01-07 RX ADMIN — MORPHINE SULFATE 30 MG: 30 TABLET, EXTENDED RELEASE ORAL at 11:01

## 2018-01-07 RX ADMIN — PIPERACILLIN SODIUM AND TAZOBACTAM SODIUM 4.5 G: 4; .5 INJECTION, POWDER, LYOPHILIZED, FOR SOLUTION INTRAVENOUS at 09:01

## 2018-01-07 RX ADMIN — DEXAMETHASONE SODIUM PHOSPHATE 4 MG: 4 INJECTION, SOLUTION INTRAMUSCULAR; INTRAVENOUS at 11:01

## 2018-01-07 RX ADMIN — IPRATROPIUM BROMIDE AND ALBUTEROL SULFATE 3 ML: .5; 3 SOLUTION RESPIRATORY (INHALATION) at 07:01

## 2018-01-07 RX ADMIN — PIPERACILLIN SODIUM AND TAZOBACTAM SODIUM 4.5 G: 4; .5 INJECTION, POWDER, LYOPHILIZED, FOR SOLUTION INTRAVENOUS at 01:01

## 2018-01-07 NOTE — SUBJECTIVE & OBJECTIVE
Interval History: Had episode of increased anxiety this AM when the RT was adding water to the humidifier. Got IV valium and then became less responsive briefly, but came back around. Looking much better now. Feeling better. Still with pain and anxiety, but the valium and dilaudid help.     Review of Systems   Constitutional: Negative for chills and fever.   Respiratory: Positive for shortness of breath. Negative for cough.    Cardiovascular: Negative for chest pain and palpitations.   Gastrointestinal: Negative for nausea and vomiting.     Objective:     Vital Signs (Most Recent):  Temp: 98.2 °F (36.8 °C) (01/07/18 0716)  Pulse: (!) 113 (01/07/18 0820)  Resp: 18 (01/07/18 0820)  BP: (!) 160/97 (01/07/18 0716)  SpO2: 95 % (01/07/18 0820) Vital Signs (24h Range):  Temp:  [96.6 °F (35.9 °C)-98.2 °F (36.8 °C)] 98.2 °F (36.8 °C)  Pulse:  [108-127] 113  Resp:  [12-34] 18  SpO2:  [93 %-98 %] 95 %  BP: (133-165)/(81-97) 160/97     Weight: 44.8 kg (98 lb 12.3 oz)  Body mass index is 17.5 kg/m².    Intake/Output Summary (Last 24 hours) at 01/07/18 1009  Last data filed at 01/07/18 0600   Gross per 24 hour   Intake              520 ml   Output                0 ml   Net              520 ml      Physical Exam   Constitutional: She is oriented to person, place, and time. She appears well-developed. No distress.   Cardiovascular: Normal rate and regular rhythm.    No murmur heard.  Pulmonary/Chest: Accessory muscle usage present. Tachypnea noted. She has decreased breath sounds. She has no wheezes. She has rhonchi.   Abdominal: Soft. She exhibits no distension. Bowel sounds are decreased. There is no tenderness.   Musculoskeletal: She exhibits no edema.   Neurological: She is alert and oriented to person, place, and time.   Skin: Skin is warm and dry.   Psychiatric: Her mood appears anxious.   Nursing note and vitals reviewed.      Significant Labs:   CBC:   Recent Labs  Lab 01/06/18  0811 01/07/18  0537   WBC 9.86 8.62   HGB  10.0* 9.9*   HCT 32.6* 32.6*    189     CMP:   Recent Labs  Lab 01/06/18  0757 01/07/18  0537    143   K 4.9 4.5   CL 99 101   CO2 30* 31*   * 147*   BUN 33* 35*   CREATININE 0.7 0.7   CALCIUM 9.2 9.1   PROT 6.1 6.1   ALBUMIN 2.2* 2.2*   BILITOT 0.5 0.4   ALKPHOS 144* 142*   AST 33 29   ALT 52* 44   ANIONGAP 13 11   EGFRNONAA >60 >60     Magnesium:   Recent Labs  Lab 01/06/18  0757 01/07/18  0537   MG 1.9 1.9       Significant Imaging: I have reviewed all pertinent imaging results/findings within the past 24 hours.

## 2018-01-07 NOTE — ASSESSMENT & PLAN NOTE
Dysphagia  Not eating much currently. Started megace 800 mg daily. Continue dexamethasone. Could have some vocal cord dysfunction because of recurrent laryngeal nerve involvement.

## 2018-01-07 NOTE — PLAN OF CARE
Problem: Patient Care Overview  Goal: Plan of Care Review  Outcome: Ongoing (interventions implemented as appropriate)  Pts safety maintained, family at the bedside. Family and pt upset. Meds given per MAR. Anxiety relieved with PRN Valum, and pain with PRN Dilaudid and 12h morphine. No appetite, pt only having small sips of fluids. Tachy on heart monitor, stating well in high flow NC. Otherwise vitals stable.

## 2018-01-07 NOTE — ASSESSMENT & PLAN NOTE
Completed moxifloxacin started last admission.WBC is down. Continue to monitor. Started on zosyn.

## 2018-01-07 NOTE — PROGRESS NOTES
[facility] Medicine  Progress Note    Patient Name: Yen Falcon  MRN: [id number]  Patient Class: IP- Inpatient   Admission Date: 12/26/2017  Length of Stay: 11 days  Attending Physician: Kristian Chiang MD  Primary Care Provider: Lauren Goodman MD        Subjective:     Principal Problem:Acute hypoxemic respiratory failure    HPI:  Yen Falcon is  42 y.o.  woman (LMP: 11/26/17) with a history of recurrent upper respiratory infection, asthma diagnosed in 2013, smoking that she quit when she was diagnosed with asthma, a left malignant pleural effusion since 10/30/17 due to stage 4 left lung carcinoma.  She is a former smoker, but quit when she was diagnosed with asthma.  She lives in [place], [place].   She was found to have a left pleural effusion at [facility] on 10/30/17 that was subsequently found to be due to metastatic lung cancer after a biopsy of a right axillary lymph node metastasis on 12/18/17.  She was hospitalized at [facility] from 12/22/17 to 12/23/17 after being transferred from [facility] for hypoxia due to the effusion.  Interventional Radiology performed thoracentesis removing 1.25 liters.  She was seen by Pulmonology (Dr. Cheko Batista and Dr. Branden Manzano) who recommended prophylactic enoxaparin.  She was seen by hematologist Dr. Vinnie Quiros to establish care.  She was prescribed home oxygen with expectation for the effusion to reaccumulate.   She returned to [facility] the day after [place] with worsening shortness of breath confirmed to be due to effusion reaccumulation on chest x-ray.   Dr. Foote and Dr. Manzano drained another 1.1 liter and planned inpatient Pleur-X catheter placement, which would not be able to be done for another 2 days due to lack of staff.  She was readmitted to [facility] for this.    Hospital  Course:  She reported constipation since her last hospitalization so was given bisacodyl and polyethylene glycol.  Dr. Batista suspected she had adenocarcinoma and needed platinum based therapy.  Dr. Manzano obtained the biopsy results from [place], which showed that it was poorly differentiated adenocarcinoma.  They placed a PleurX catheter at bedside in the ICU.  After discussion with Dr. Quiros, it was decided she needed inpatient chemotherapy due to rapid progression.  She was started on carboplatin and etoposide.  Brain MRI and CT of the chest/abdomen/pelvis showed extensive metastases including in the brain, with vasogenic edema.  Dr. Quiros started steroids.  She required high flow nasal cannula.  She was transferred out of the ICU on 12/29/17.  She continued to complain of a sensation of food building up in her upper abdomen.  Pulmonology felt her pancreatic metastasis may be involved, and recommended Gastroenterology evaluation.    Interval History: Had difficult night with pain and restlessness. A little better this AM. Having difficulty swallowing anything currently, but solids/pills are worse than liquids. Continues to complain of dry mouth. Says that she just wants to be able to eat better so that she can get stronger to get through this.     Review of Systems   Constitutional: Negative for chills and fever.   Respiratory: Positive for shortness of breath. Negative for cough.    Cardiovascular: Positive for chest pain. Negative for palpitations.   Gastrointestinal: Negative for nausea and vomiting.     Objective:     Vital Signs (Most Recent):  Temp: 97.1 °F (36.2 °C) (retaken) (01/06/18 1615)  Pulse: (!) 112 (01/06/18 1524)  Resp: 12 (01/06/18 1524)  BP: 139/87 (01/06/18 1524)  SpO2: (!) 94 % (01/06/18 1449) Vital Signs (24h Range):  Temp:  [96.6 °F (35.9 °C)-98.6 °F (37 °C)] 97.1 °F (36.2 °C)  Pulse:  [] 112  Resp:  [12-24] 12  SpO2:  [92 %-97 %] 94 %  BP: (139-175)/(81-97) 139/87      Weight: 44.8 kg (98 lb 12.3 oz)  Body mass index is 17.5 kg/m².    Intake/Output Summary (Last 24 hours) at 01/06/18 1804  Last data filed at 01/05/18 2205   Gross per 24 hour   Intake              100 ml   Output                0 ml   Net              100 ml      Physical Exam   Constitutional: She is oriented to person, place, and time. She appears well-developed. No distress.   Cardiovascular: Normal rate and regular rhythm.    No murmur heard.  Pulmonary/Chest: She is in respiratory distress. She has decreased breath sounds. She has wheezes. She has rhonchi.   Abdominal: Soft. She exhibits no distension. Bowel sounds are decreased. There is tenderness in the epigastric area.   Musculoskeletal: She exhibits no edema.   Neurological: She is alert and oriented to person, place, and time.   Skin: Skin is warm and dry.   Psychiatric: Her mood appears anxious.   Nursing note and vitals reviewed.      Significant Labs:   CBC:   Recent Labs  Lab 01/05/18  0731 01/06/18  0811   WBC 11.96 9.86   HGB 10.2* 10.0*   HCT 32.4* 32.6*   * 155     CMP:   Recent Labs  Lab 01/05/18  0730 01/06/18  0757   * 142   K 4.2 4.9   CL 98 99   CO2 31* 30*   * 129*   BUN 22* 33*   CREATININE 0.6 0.7   CALCIUM 8.9 9.2   PROT 5.8* 6.1   ALBUMIN 2.2* 2.2*   BILITOT 0.4 0.5   ALKPHOS 143* 144*   AST 33 33   ALT 49* 52*   ANIONGAP 6* 13   EGFRNONAA >60 >60     Magnesium:   Recent Labs  Lab 01/05/18  0731 01/06/18  0757   MG 1.7 1.9       Significant Imaging: I have reviewed all pertinent imaging results/findings within the past 24 hours.    Assessment/Plan:      * Acute hypoxemic respiratory failure    Multifactorial. Having more SOB/MAIER today. CXR today is generally unchanged. Likely some anxiety contributing to her symptoms. Had to go up on the supplemental oxygen today.        Pleural effusion on left    Admission for chest tube placement  Acute hypoxemic respiratory failure  Malignant neoplasm of left lung stage  4  Malignant neoplasm metastatic to lymph node of axilla  Asthma  Brain metastases with cerebral edema  Continue supplemental oxygen. Continue HFNC or comfort flow per patient desire. Continue scheduled nebulizer treatments.  Continue prophylactic enoxaparin. Pulmonology placed Pleur-X catheter.  Started chemotherapy and will receive next round on 1/17/18.  Steroids started for brain metastases, and pantoprazole started due to abdominal complaints.  Give vanilla Boost supplements. Increased dexamethasone back to q6 hrs. Continue Zosyn which was started empirically. Increased dilaudid frequency to q1 hrs prn. Valium IV prn. Hyoscyamine prn. Working on her comfort while working on getting her and family to feel that hospice is best at this time. She is DNR now. Further conversations about goals of care.         Moderate protein-calorie malnutrition    Dysphagia  Not eating much currently. Started megace 800 mg daily. Continue dexamethasone. Could have some vocal cord dysfunction because of recurrent laryngeal nerve involvement.           Constipation    Continue polyethylene glycol. Lactulose as needed.         Bilateral pneumonia    Completed moxifloxacin started last admission.WBC is down. Continue to monitor. Started on zosyn.         Anxiety    Continue bupropion and lorazepam PRN. Start on valium IV prn.            VTE Risk Mitigation         Ordered     heparin, porcine (PF) 100 unit/mL injection flush 500 Units  As needed (PRN)     Route:  Intravenous        12/27/17 1210     enoxaparin injection 40 mg  Daily     Route:  Subcutaneous        12/26/17 2223     Medium Risk of VTE  Once      12/26/17 2223              Kristian Chiang MD  Department of Hospital Medicine   [facility]

## 2018-01-07 NOTE — PLAN OF CARE
Problem: Patient Care Overview  Goal: Plan of Care Review  Pt received on documented flow, pt sitting in bed; no respiratory distress noted. Will cont to monitor.

## 2018-01-07 NOTE — SUBJECTIVE & OBJECTIVE
Interval History: Had difficult night with pain and restlessness. A little better this AM. Having difficulty swallowing anything currently, but solids/pills are worse than liquids. Continues to complain of dry mouth. Says that she just wants to be able to eat better so that she can get stronger to get through this.     Review of Systems   Constitutional: Negative for chills and fever.   Respiratory: Positive for shortness of breath. Negative for cough.    Cardiovascular: Positive for chest pain. Negative for palpitations.   Gastrointestinal: Negative for nausea and vomiting.     Objective:     Vital Signs (Most Recent):  Temp: 97.1 °F (36.2 °C) (retaken) (01/06/18 1615)  Pulse: (!) 112 (01/06/18 1524)  Resp: 12 (01/06/18 1524)  BP: 139/87 (01/06/18 1524)  SpO2: (!) 94 % (01/06/18 1449) Vital Signs (24h Range):  Temp:  [96.6 °F (35.9 °C)-98.6 °F (37 °C)] 97.1 °F (36.2 °C)  Pulse:  [] 112  Resp:  [12-24] 12  SpO2:  [92 %-97 %] 94 %  BP: (139-175)/(81-97) 139/87     Weight: 44.8 kg (98 lb 12.3 oz)  Body mass index is 17.5 kg/m².    Intake/Output Summary (Last 24 hours) at 01/06/18 1804  Last data filed at 01/05/18 2205   Gross per 24 hour   Intake              100 ml   Output                0 ml   Net              100 ml      Physical Exam   Constitutional: She is oriented to person, place, and time. She appears well-developed. No distress.   Cardiovascular: Normal rate and regular rhythm.    No murmur heard.  Pulmonary/Chest: She is in respiratory distress. She has decreased breath sounds. She has wheezes. She has rhonchi.   Abdominal: Soft. She exhibits no distension. Bowel sounds are decreased. There is tenderness in the epigastric area.   Musculoskeletal: She exhibits no edema.   Neurological: She is alert and oriented to person, place, and time.   Skin: Skin is warm and dry.   Psychiatric: Her mood appears anxious.   Nursing note and vitals reviewed.      Significant Labs:   CBC:   Recent Labs  Lab  01/05/18  0731 01/06/18  0811   WBC 11.96 9.86   HGB 10.2* 10.0*   HCT 32.4* 32.6*   * 155     CMP:   Recent Labs  Lab 01/05/18  0730 01/06/18  0757   * 142   K 4.2 4.9   CL 98 99   CO2 31* 30*   * 129*   BUN 22* 33*   CREATININE 0.6 0.7   CALCIUM 8.9 9.2   PROT 5.8* 6.1   ALBUMIN 2.2* 2.2*   BILITOT 0.4 0.5   ALKPHOS 143* 144*   AST 33 33   ALT 49* 52*   ANIONGAP 6* 13   EGFRNONAA >60 >60     Magnesium:   Recent Labs  Lab 01/05/18  0731 01/06/18  0757   MG 1.7 1.9       Significant Imaging: I have reviewed all pertinent imaging results/findings within the past 24 hours.

## 2018-01-07 NOTE — PLAN OF CARE
Problem: Patient Care Overview  Goal: Plan of Care Review  Outcome: Ongoing (interventions implemented as appropriate)  Pt AAOx4. Complaint of pain with relef from dilaudud every 2 hours. Anxiety with Volume given with relief.   15L [place] flow NC with oxygen sat >90%.  Tele monitoring sinus tachycardia  120's -140's.  Poor  Diet intake. Bed rest with bed side commode. Pt with small loose BM. Safety maintained. Pt called for needs and assistance. Will continue to monitor.

## 2018-01-07 NOTE — ASSESSMENT & PLAN NOTE
Admission for chest tube placement  Acute hypoxemic respiratory failure  Malignant neoplasm of left lung stage 4  Malignant neoplasm metastatic to lymph node of axilla  Asthma  Brain metastases with cerebral edema  Continue supplemental oxygen. Continue HFNC or comfort flow per patient desire. Continue scheduled nebulizer treatments.  Continue prophylactic enoxaparin. Pulmonology placed Pleur-X catheter.  Started chemotherapy and will receive next round on 1/17/18.  Steroids started for brain metastases, and pantoprazole started due to abdominal complaints.  Give vanilla Boost supplements. Increased dexamethasone back to q6 hrs. Continue Zosyn which was started empirically. Increased dilaudid frequency to q1 hrs prn. Valium IV prn. Hyoscyamine prn. Working on her comfort while working on getting her and family to feel that hospice is best at this time. She is DNR now. Further conversations about goals of care.

## 2018-01-08 VITALS
HEART RATE: 124 BPM | OXYGEN SATURATION: 90 % | TEMPERATURE: 98 F | RESPIRATION RATE: 18 BRPM | WEIGHT: 98.75 LBS | DIASTOLIC BLOOD PRESSURE: 92 MMHG | BODY MASS INDEX: 17.5 KG/M2 | SYSTOLIC BLOOD PRESSURE: 147 MMHG | HEIGHT: 63 IN

## 2018-01-08 PROCEDURE — 27100171 HC OXYGEN HIGH FLOW UP TO 24 HOURS

## 2018-01-08 PROCEDURE — 63600175 PHARM REV CODE 636 W HCPCS: Performed by: HOSPITALIST

## 2018-01-08 PROCEDURE — 99232 SBSQ HOSP IP/OBS MODERATE 35: CPT | Mod: ,,, | Performed by: INTERNAL MEDICINE

## 2018-01-08 PROCEDURE — 94761 N-INVAS EAR/PLS OXIMETRY MLT: CPT

## 2018-01-08 PROCEDURE — 63600175 PHARM REV CODE 636 W HCPCS: Performed by: NURSE PRACTITIONER

## 2018-01-08 PROCEDURE — 25000003 PHARM REV CODE 250: Performed by: HOSPITALIST

## 2018-01-08 PROCEDURE — 27000221 HC OXYGEN, UP TO 24 HOURS

## 2018-01-08 PROCEDURE — 99900035 HC TECH TIME PER 15 MIN (STAT)

## 2018-01-08 PROCEDURE — 25000003 PHARM REV CODE 250: Performed by: EMERGENCY MEDICINE

## 2018-01-08 PROCEDURE — A4216 STERILE WATER/SALINE, 10 ML: HCPCS | Performed by: EMERGENCY MEDICINE

## 2018-01-08 RX ORDER — HYDROMORPHONE HYDROCHLORIDE 2 MG/ML
2 INJECTION, SOLUTION INTRAMUSCULAR; INTRAVENOUS; SUBCUTANEOUS
Status: DISCONTINUED | OUTPATIENT
Start: 2018-01-08 | End: 2018-01-08 | Stop reason: HOSPADM

## 2018-01-08 RX ORDER — HYDROMORPHONE HYDROCHLORIDE 2 MG/ML
1 INJECTION, SOLUTION INTRAMUSCULAR; INTRAVENOUS; SUBCUTANEOUS
Status: DISCONTINUED | OUTPATIENT
Start: 2018-01-08 | End: 2018-01-08

## 2018-01-08 RX ORDER — HYOSCYAMINE SULFATE 0.5 MG/ML
0.25 INJECTION, SOLUTION SUBCUTANEOUS EVERY 4 HOURS PRN
Start: 2018-01-08

## 2018-01-08 RX ORDER — HYDROMORPHONE HYDROCHLORIDE 1 MG/ML
1 INJECTION, SOLUTION INTRAMUSCULAR; INTRAVENOUS; SUBCUTANEOUS
Refills: 0
Start: 2018-01-08

## 2018-01-08 RX ORDER — LORAZEPAM 2 MG/ML
2 CONCENTRATE ORAL
Start: 2018-01-08 | End: 2018-02-07

## 2018-01-08 RX ORDER — IPRATROPIUM BROMIDE AND ALBUTEROL SULFATE 2.5; .5 MG/3ML; MG/3ML
3 SOLUTION RESPIRATORY (INHALATION) EVERY 4 HOURS PRN
Qty: 1 BOX | Refills: 0
Start: 2018-01-08 | End: 2019-01-08

## 2018-01-08 RX ORDER — HYDROMORPHONE HYDROCHLORIDE 2 MG/ML
1 INJECTION, SOLUTION INTRAMUSCULAR; INTRAVENOUS; SUBCUTANEOUS ONCE
Status: COMPLETED | OUTPATIENT
Start: 2018-01-08 | End: 2018-01-08

## 2018-01-08 RX ADMIN — HYDROMORPHONE HYDROCHLORIDE 1 MG: 2 INJECTION, SOLUTION INTRAMUSCULAR; INTRAVENOUS; SUBCUTANEOUS at 12:01

## 2018-01-08 RX ADMIN — HYDROMORPHONE HYDROCHLORIDE 2 MG: 2 INJECTION, SOLUTION INTRAMUSCULAR; INTRAVENOUS; SUBCUTANEOUS at 02:01

## 2018-01-08 RX ADMIN — DEXAMETHASONE SODIUM PHOSPHATE 4 MG: 4 INJECTION, SOLUTION INTRAMUSCULAR; INTRAVENOUS at 12:01

## 2018-01-08 RX ADMIN — HYDROMORPHONE HYDROCHLORIDE 0.5 MG/HR: 2 INJECTION, SOLUTION INTRAMUSCULAR; INTRAVENOUS; SUBCUTANEOUS at 01:01

## 2018-01-08 RX ADMIN — LORAZEPAM 1 MG: 2 INJECTION INTRAMUSCULAR; INTRAVENOUS at 01:01

## 2018-01-08 RX ADMIN — HYDROMORPHONE HYDROCHLORIDE 2 MG: 2 INJECTION, SOLUTION INTRAMUSCULAR; INTRAVENOUS; SUBCUTANEOUS at 03:01

## 2018-01-08 RX ADMIN — LORAZEPAM 1 MG: 2 INJECTION INTRAMUSCULAR; INTRAVENOUS at 09:01

## 2018-01-08 RX ADMIN — HYDROMORPHONE HYDROCHLORIDE 2 MG: 2 INJECTION, SOLUTION INTRAMUSCULAR; INTRAVENOUS; SUBCUTANEOUS at 08:01

## 2018-01-08 RX ADMIN — SODIUM CHLORIDE, PRESERVATIVE FREE 3 ML: 5 INJECTION INTRAVENOUS at 02:01

## 2018-01-08 RX ADMIN — HYDROMORPHONE HYDROCHLORIDE 2 MG: 2 INJECTION, SOLUTION INTRAMUSCULAR; INTRAVENOUS; SUBCUTANEOUS at 06:01

## 2018-01-08 RX ADMIN — HYDROMORPHONE HYDROCHLORIDE 2 MG: 2 INJECTION, SOLUTION INTRAMUSCULAR; INTRAVENOUS; SUBCUTANEOUS at 04:01

## 2018-01-08 RX ADMIN — LORAZEPAM 1 MG: 2 INJECTION INTRAMUSCULAR; INTRAVENOUS at 05:01

## 2018-01-08 NOTE — PROGRESS NOTES
per MD - family has chosen [facility] hosp[facility] - [facility] - for pt   TN met with pt's mother Ms. Harper   [phone] -- advised her that Crissy Hodges would call her --       TN gave mother's phone # to Crissy - family will be contacted this am.

## 2018-01-08 NOTE — PLAN OF CARE
[facility]  Kristian Chiang MD, [facility]     MD Gail Mcginnis FNP Renee Melancon, PA-C Rosanne Zeringue, NP  [address] [place] [id number]  Office: [phone]  Fax: [phone]      HOSPICE  ORDERS     01/08/2018    Admit to Hospice:  Inpatient [facility] - [facility]     Diagnoses:  Active Hospital Problems    Diagnosis  POA    *Acute hypoxemic respiratory failure [J96.01]  Yes    Pleural effusion on left [J90]  Yes     Priority: 2     DNR (do not resuscitate) [Z66]  Yes    Moderate protein-calorie malnutrition [E44.0]  Yes    Admission for chest tube placement [Z46.82]  Not Applicable    Constipation [K59.00]  Yes    Malignant neoplasm of left lung stage 4 [C34.92]  Yes    Bilateral pneumonia [J18.9]  Yes    Malignant neoplasm metastatic to lymph node of axilla [C77.3]  Yes     · Although highly suspicious (and confirmed by  pulmonologist's ) there is no definitive, objective proof that this patient has a malignancy.      Anxiety [F41.9]  Yes      Resolved Hospital Problems    Diagnosis Date Resolved POA   No resolved problems to display.       Hospice Qualifying Diagnoses: Stage IV non-small cell lung cancer       Patient has a life expectancy < 6 months due to these conditions.    Vital Signs: Routine per Hospice Protocol.    Allergies:  Review of patient's allergies indicates:   Allergen Reactions    Doxycycline Rash    Cat/feline products        Discharge Procedure Orders  Diet Adult Regular     Vital signs per facility protocol     Activity as tolerated     DNR (Do Not Resuscitate)     Oxygen   Order Comments: High flow nasal cannula at 15 LPM       Activities: As tolerated    Nursing: Per Hospice Routine    Future Orders:  Hospice Medical Director may dictate new orders for comfortable care measures & sign death certificate.    Medications:         Comfort Care Medications Only      Yen Falcon   Home Medication Instructions RUBINA:[id number]    Printed on:01/08/18 1002   Medication Information                      albuterol-ipratropium 2.5mg-0.5mg/3mL (DUO-NEB) 0.5 mg-3 mg(2.5 mg base)/3 mL nebulizer solution  Take 3 mLs by nebulization every 4 (four) hours as needed for Wheezing. Rescue             dextrose 5 % SolP 100 mL with HYDROmorphone 2 mg/mL Soln 20 mg  Inject 0.5 mg/hr into the vein continuous.             HYDROmorphone (DILAUDID) 1 mg/mL Syrg  Inject 1 mL (1 mg total) into the vein every hour as needed (pain or severe dyspnea).             hyoscyamine (LEVSIN) 0.5 mg/mL injection  Inject 0.5 mLs (0.25 mg total) into the vein every 4 (four) hours as needed (excessive secretions).             lorazepam 2 mg/ml oral conc (ATIVAN) 2 mg/mL Conc  Take 1 mL (2 mg total) by mouth every 2 (two) hours as needed (anxiety).                     Kristian Chiang MD  01/08/2018

## 2018-01-08 NOTE — PROGRESS NOTES
Patients O2 sat was 83% on HFNC 15L.  Placed patient back on Comfort Flow 25L/100%.  Patients O2 sats helio to 90%.

## 2018-01-08 NOTE — PROGRESS NOTES
[facility]  Hematology/Oncology  Progress Note    Patient Name: Yen Falcon  Admission Date: 12/26/2017  Hospital Length of Stay: 13 days  Code Status: DNR     Subjective:     HPI:  43 yo female recently diagnosed with Stage 4 lung carcinoma, underwent left thoracentesis for symptomatic malignant pleural effusion on 12/22/17 - now admitted with worsening dyspnea, has rapid accumulation of pleural fluid and new effusion on right.    She is in the ICU.    Biopsy report of right axillary LN/subcutaneous nodule done 12/18/17 showed poorly differentiated carcinoma compatible with primary lung adenocarcinoma.    Interval History: family at bedside    Oncology Treatment Plan:   CARBOPLATIN (AUC) + ETOPOSIDE    Medications:  Continuous Infusions:   HYDROmorphone (DILAUDID) infusion (NON-TITRATING) 0.5 mg/hr (01/08/18 1310)     Scheduled Meds:   dexamethasone  4 mg Intravenous Q6H    megestrol  400 mg Oral BID    sodium chloride 0.9%  3 mL Intravenous Q8H     PRN Meds:acetaminophen, albuterol-ipratropium 2.5mg-0.5mg/3mL, alteplase, aluminum-magnesium hydroxide-simethicone, bisacodyl, heparin, porcine (PF), HYDROmorphone, hyoscyamine, lactulose, lorazepam, ondansetron, sodium chloride 0.9%, sodium chloride 0.9%, sodium chloride 0.9%     Review of Systems   Unable to perform ROS: Acuity of condition     Objective:     Vital Signs (Most Recent):  Temp: 98.1 °F (36.7 °C) (01/08/18 0815)  Pulse: (!) 124 (01/08/18 1128)  Resp: 18 (01/08/18 0815)  BP: (!) 147/92 (01/08/18 0815)  SpO2: (!) 90 % (01/08/18 1225) Vital Signs (24h Range):  Temp:  [98.1 °F (36.7 °C)-98.9 °F (37.2 °C)] 98.1 °F (36.7 °C)  Pulse:  [] 124  Resp:  [] 18  SpO2:  [83 %-95 %] 90 %  BP: (145-168)/() 147/92     Weight: 44.8 kg (98 lb 12.3 oz)  Body mass index is 17.5 kg/m².  Body surface area is 1.41 meters squared.      Intake/Output Summary (Last 24 hours) at 01/08/18 1535  Last data filed at 01/07/18 1816   Gross per  24 hour   Intake              480 ml   Output                0 ml   Net              480 ml       Physical Exam   Constitutional: She appears listless. She appears cachectic. She is sleeping. She has a sickly appearance. She appears ill.   Pulmonary/Chest: Accessory muscle usage present. She has decreased breath sounds. She has rhonchi.   Abdominal: She exhibits no distension, no fluid wave and no mass.   Neurological: She appears listless.   Nursing note and vitals reviewed.      Significant Labs:   All pertinent labs from the last 24 hours have been reviewed.    Diagnostic Results:  I have reviewed all pertinent imaging results/findings within the past 24 hours.    Assessment/Plan:     Malignant neoplasm of left lung stage 4    Terminal Lung Cancer.  Has agonal respirations.  She is comfortable.  To hospice today.  D/w family.  All questions answered.                      Vinnie Quiros MD  Hematology/Oncology  [facility]

## 2018-01-08 NOTE — SUBJECTIVE & OBJECTIVE
Interval History: family at bedside    Oncology Treatment Plan:   CARBOPLATIN (AUC) + ETOPOSIDE    Medications:  Continuous Infusions:   HYDROmorphone (DILAUDID) infusion (NON-TITRATING) 0.5 mg/hr (01/08/18 1310)     Scheduled Meds:   dexamethasone  4 mg Intravenous Q6H    megestrol  400 mg Oral BID    sodium chloride 0.9%  3 mL Intravenous Q8H     PRN Meds:acetaminophen, albuterol-ipratropium 2.5mg-0.5mg/3mL, alteplase, aluminum-magnesium hydroxide-simethicone, bisacodyl, heparin, porcine (PF), HYDROmorphone, hyoscyamine, lactulose, lorazepam, ondansetron, sodium chloride 0.9%, sodium chloride 0.9%, sodium chloride 0.9%     Review of Systems   Unable to perform ROS: Acuity of condition     Objective:     Vital Signs (Most Recent):  Temp: 98.1 °F (36.7 °C) (01/08/18 0815)  Pulse: (!) 124 (01/08/18 1128)  Resp: 18 (01/08/18 0815)  BP: (!) 147/92 (01/08/18 0815)  SpO2: (!) 90 % (01/08/18 1225) Vital Signs (24h Range):  Temp:  [98.1 °F (36.7 °C)-98.9 °F (37.2 °C)] 98.1 °F (36.7 °C)  Pulse:  [] 124  Resp:  [] 18  SpO2:  [83 %-95 %] 90 %  BP: (145-168)/() 147/92     Weight: 44.8 kg (98 lb 12.3 oz)  Body mass index is 17.5 kg/m².  Body surface area is 1.41 meters squared.      Intake/Output Summary (Last 24 hours) at 01/08/18 1535  Last data filed at 01/07/18 1816   Gross per 24 hour   Intake              480 ml   Output                0 ml   Net              480 ml       Physical Exam   Constitutional: She appears listless. She appears cachectic. She is sleeping. She has a sickly appearance. She appears ill.   Pulmonary/Chest: Accessory muscle usage present. She has decreased breath sounds. She has rhonchi.   Abdominal: She exhibits no distension, no fluid wave and no mass.   Neurological: She appears listless.   Nursing note and vitals reviewed.      Significant Labs:   All pertinent labs from the last 24 hours have been reviewed.    Diagnostic Results:  I have reviewed all pertinent imaging  results/findings within the past 24 hours.

## 2018-01-08 NOTE — CHAPLAIN
Family requested  and .  I visited with the RN, then the family, and then contacted the  from [facility].  Pt is actively dying  And many family are in the room.

## 2018-01-08 NOTE — ASSESSMENT & PLAN NOTE
Multifactorial. CXR is generally unchanged. Likely some anxiety contributing to her symptoms. Had to go up on the supplemental oxygen.

## 2018-01-08 NOTE — PT/OT/SLP DISCHARGE
Physical Therapy Discharge Summary    Name: Yen Falcon  MRN: [id number]   Principal Problem: Acute hypoxemic respiratory failure     Patient Discharged from acute Physical Therapy on 2018.  Please refer to prior PT noted date on 18 for functional status.     Assessment:     Pt on hospice care and no longer appropriate for PT.     Objective:     GOALS:    Physical Therapy Goals        Problem: Physical Therapy Goal    Goal Priority Disciplines Outcome Goal Variances Interventions   Physical Therapy Goal     PT/OT, PT Ongoing (interventions implemented as appropriate)     Description:  Goals to be met by: 18     Patient will increase functional independence with mobility by performin. Sit to stand transfer with [place]  2. Bed to chair transfer with [place]  3. Gait  x 150 feet with [place]                       Reasons for Discontinuation of Therapy Services  Therapist determines that the patient will no longer appropriate for therapy services.      Plan:     Patient Discharged to: Palliative Care/Hospice.    Dominik Martines, PT, DPT  2018

## 2018-01-08 NOTE — ASSESSMENT & PLAN NOTE
Terminal Lung Cancer.  Has agonal respirations.  She is comfortable.  To hospice today.  D/w family.  All questions answered.

## 2018-01-08 NOTE — PROGRESS NOTES
[facility] Hospital Medicine  Progress Note    Patient Name: Yen Falcon  MRN: [id number]  Patient Class: IP- Inpatient   Admission Date: 12/26/2017  Length of Stay: 13 days  Attending Physician: Kristian Chiang MD  Primary Care Provider: Lauren Goodman MD        Subjective:     Principal Problem:Acute hypoxemic respiratory failure    HPI:  Yen Falcon is  42 y.o.  woman (LMP: 11/26/17) with a history of recurrent upper respiratory infection, asthma diagnosed in 2013, smoking that she quit when she was diagnosed with asthma, a left malignant pleural effusion since 10/30/17 due to stage 4 left lung carcinoma.  She is a former smoker, but quit when she was diagnosed with asthma.  She lives in [place], [place].   She was found to have a left pleural effusion at [facility] on 10/30/17 that was subsequently found to be due to metastatic lung cancer after a biopsy of a right axillary lymph node metastasis on 12/18/17.  She was hospitalized at [facility] from 12/22/17 to 12/23/17 after being transferred from [facility] for hypoxia due to the effusion.  Interventional Radiology performed thoracentesis removing 1.25 liters.  She was seen by Pulmonology (Dr. Cheko Batista and Dr. Branden Manzano) who recommended prophylactic enoxaparin.  She was seen by hematologist Dr. Vinnie Quiros to establish care.  She was prescribed home oxygen with expectation for the effusion to reaccumulate.   She returned to [facility] the day after [place] with worsening shortness of breath confirmed to be due to effusion reaccumulation on chest x-ray.   Dr. Foote and Dr. Manzano drained another 1.1 liter and planned inpatient Pleur-X catheter placement, which would not be able to be done for another 2 days due to lack of staff.  She was readmitted to [facility] for this.    Hospital  Course:  She reported constipation since her last hospitalization so was given bisacodyl and polyethylene glycol.  Dr. Batista suspected she had adenocarcinoma and needed platinum based therapy.  Dr. Manzano obtained the biopsy results from [place], which showed that it was poorly differentiated adenocarcinoma.  They placed a PleurX catheter at bedside in the ICU.  After discussion with Dr. Quiros, it was decided she needed inpatient chemotherapy due to rapid progression.  She was started on carboplatin and etoposide.  Brain MRI and CT of the chest/abdomen/pelvis showed extensive metastases including in the brain, with vasogenic edema.  Dr. Quiros started steroids.  She required high flow nasal cannula.  She was transferred out of the ICU on 12/29/17.  She continued to complain of a sensation of food building up in her upper abdomen.  Pulmonology felt her pancreatic metastasis may be involved, and recommended Gastroenterology evaluation.    Interval History: Had episode of increased anxiety this AM when the RT was adding water to the humidifier. Got IV valium and then became less responsive briefly, but came back around. Looking much better now. Feeling better. Still with pain and anxiety, but the valium and dilaudid help.     Review of Systems   Constitutional: Negative for chills and fever.   Respiratory: Positive for shortness of breath. Negative for cough.    Cardiovascular: Negative for chest pain and palpitations.   Gastrointestinal: Negative for nausea and vomiting.     Objective:     Vital Signs (Most Recent):  Temp: 98.2 °F (36.8 °C) (01/07/18 0716)  Pulse: (!) 113 (01/07/18 0820)  Resp: 18 (01/07/18 0820)  BP: (!) 160/97 (01/07/18 0716)  SpO2: 95 % (01/07/18 0820) Vital Signs (24h Range):  Temp:  [96.6 °F (35.9 °C)-98.2 °F (36.8 °C)] 98.2 °F (36.8 °C)  Pulse:  [108-127] 113  Resp:  [12-34] 18  SpO2:  [93 %-98 %] 95 %  BP: (133-165)/(81-97) 160/97     Weight: 44.8 kg (98 lb 12.3 oz)  Body mass index  is 17.5 kg/m².    Intake/Output Summary (Last 24 hours) at 01/07/18 1009  Last data filed at 01/07/18 0600   Gross per 24 hour   Intake              520 ml   Output                0 ml   Net              520 ml      Physical Exam   Constitutional: She is oriented to person, place, and time. She appears well-developed. No distress.   Cardiovascular: Normal rate and regular rhythm.    No murmur heard.  Pulmonary/Chest: Accessory muscle usage present. Tachypnea noted. She has decreased breath sounds. She has no wheezes. She has rhonchi.   Abdominal: Soft. She exhibits no distension. Bowel sounds are decreased. There is no tenderness.   Musculoskeletal: She exhibits no edema.   Neurological: She is alert and oriented to person, place, and time.   Skin: Skin is warm and dry.   Psychiatric: Her mood appears anxious.   Nursing note and vitals reviewed.      Significant Labs:   CBC:   Recent Labs  Lab 01/06/18  0811 01/07/18  0537   WBC 9.86 8.62   HGB 10.0* 9.9*   HCT 32.6* 32.6*    189     CMP:   Recent Labs  Lab 01/06/18  0757 01/07/18  0537    143   K 4.9 4.5   CL 99 101   CO2 30* 31*   * 147*   BUN 33* 35*   CREATININE 0.7 0.7   CALCIUM 9.2 9.1   PROT 6.1 6.1   ALBUMIN 2.2* 2.2*   BILITOT 0.5 0.4   ALKPHOS 144* 142*   AST 33 29   ALT 52* 44   ANIONGAP 13 11   EGFRNONAA >60 >60     Magnesium:   Recent Labs  Lab 01/06/18  0757 01/07/18  0537   MG 1.9 1.9       Significant Imaging: I have reviewed all pertinent imaging results/findings within the past 24 hours.    Assessment/Plan:      * Acute hypoxemic respiratory failure    Multifactorial. CXR is generally unchanged. Likely some anxiety contributing to her symptoms. Had to go up on the supplemental oxygen.        Pleural effusion on left    Admission for chest tube placement  Acute hypoxemic respiratory failure  Malignant neoplasm of left lung stage 4  Malignant neoplasm metastatic to lymph node of axilla  Asthma  Brain metastases with cerebral  edema  Continue supplemental oxygen. Continue HFNC or comfort flow per patient desire. Continue scheduled nebulizer treatments.  Continue prophylactic enoxaparin. Pulmonology placed Pleur-X catheter.  Started chemotherapy and will receive next round on 1/17/18.  Steroids started for brain metastases, and pantoprazole started due to abdominal complaints.  Give vanilla Boost supplements. Increased dexamethasone back to q6 hrs. Continue Zosyn which was started empirically. Increased dilaudid frequency to q1 hrs prn. Valium IV prn. Hyoscyamine prn. Working on her comfort while working on getting her and family to feel that hospice is best at this time. She is DNR now. Further conversations about goals of care.         Moderate protein-calorie malnutrition    Dysphagia  Not eating much currently. Started megace 800 mg daily. Continue dexamethasone. Could have some vocal cord dysfunction because of recurrent laryngeal nerve involvement.           Constipation    Continue polyethylene glycol. Lactulose as needed.         Bilateral pneumonia    Completed moxifloxacin started last admission.WBC is down. Continue to monitor. Started on zosyn.         Anxiety    Continue bupropion and lorazepam PRN. Start on valium IV prn.            VTE Risk Mitigation         Ordered     heparin, porcine (PF) 100 unit/mL injection flush 500 Units  As needed (PRN)     Route:  Intravenous        12/27/17 1210     Medium Risk of VTE  Once      12/26/17 2223              Kristian Chiang MD  Department of Hospital Medicine   [facility]

## 2018-01-09 NOTE — PLAN OF CARE
pt d/c'd today to [facility]   Transportation per Ewelina Lamas -  O2 - high flow ordered.        01/08/18 1822   Final Note   Assessment Type Final Discharge Note   Discharge Disposition [facility]  ([facility] )   Hospital Follow Up  Appt(s) scheduled? No   Discharge plans and expectations educations in teach back method with documentation complete? No   Right Care Referral Info   Post Acute Recommendation No Care   Referral Type (Hospice IP )

## 2018-01-09 NOTE — DISCHARGE SUMMARY
[facility] Medicine  Discharge Summary      Patient Name: Yen Falcon  MRN: [id number]  Admission Date: 12/26/2017  Hospital Length of Stay: 13 days  Discharge Date and Time: 1/8/2018  4:35 PM  Attending Physician: Kristian Chiang MD   Discharging Provider: Kristian Chiang MD  Primary Care Provider: Lauren Goodman MD      HPI:   Yen Falcon is  42 y.o.  woman (LMP: 11/26/17) with a history of recurrent upper respiratory infection, asthma diagnosed in 2013, smoking that she quit when she was diagnosed with asthma, a left malignant pleural effusion since 10/30/17 due to stage 4 left lung carcinoma.  She is a former smoker, but quit when she was diagnosed with asthma.  She lives in [place], [place].   She was found to have a left pleural effusion at [facility] on 10/30/17 that was subsequently found to be due to metastatic lung cancer after a biopsy of a right axillary lymph node metastasis on 12/18/17.  She was hospitalized at [facility] from 12/22/17 to 12/23/17 after being transferred from [facility] for hypoxia due to the effusion.  Interventional Radiology performed thoracentesis removing 1.25 liters.  She was seen by Pulmonology (Dr. Cheko Batista and Dr. Branden Manzano) who recommended prophylactic enoxaparin.  She was seen by hematologist Dr. Vinnie Quiros to establish care.  She was prescribed home oxygen with expectation for the effusion to reaccumulate.   She returned to [facility] the day after [place] with worsening shortness of breath confirmed to be due to effusion reaccumulation on chest x-ray.   Dr. Foote and Dr. Manzano drained another 1.1 liter and planned inpatient Pleur-X catheter placement, which would not be able to be done for another 2 days due to lack of staff.  She was readmitted to [facility] for this.    * No surgery found *       Hospital Course:   She reported constipation since her last hospitalization so was given bisacodyl and polyethylene glycol.  Dr. Batista suspected she had adenocarcinoma and needed platinum based therapy.  Dr. Manzano obtained the biopsy results from [place], which showed that it was poorly differentiated adenocarcinoma.  They placed a PleurX catheter at bedside in the ICU.  After discussion with Dr. Quiros, it was decided she needed inpatient chemotherapy due to rapid progression.  She was started on carboplatin and etoposide and that was given on 12/30.  Brain MRI and CT of the chest/abdomen/pelvis showed extensive metastases including in the brain, with vasogenic edema.  Dr. Quiros started steroids.  She required high flow nasal cannula.  She was transferred out of the ICU on 12/29/17.  She continued to complain of a sensation of food building up in her upper abdomen.  Pulmonology felt her pancreatic metastasis may be involved, and recommended Gastroenterology evaluation. It was felt that she may have an ileus at that time and focus was on bowel regimen. She had BMs and was tolerating a liquid diet. She continued to have pain in her abdomen and MS Contin was started. Her respiratory status continued to wax and wane at times and caused significant anxiety. She was restarted on comfort flow and would go between comfort flow and HFNC at times. Her clinical status continued to worsen over the week and her pain increased requiring more frequent dosing of pain medication. She was ultimately started on a dilaudid gtt for pain control and decision was made to transition to inpatient hospice at [facility].      Consults:     No new Assessment & Plan notes have been filed under this hospital service since the last note was generated.  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    Specialty:  Physical Therapy  Why:  As needed  Contact information:  [address]  [address]  [place] [address]  [phone]                 Patient Instructions:     Diet Adult Regular     Vital signs per facility protocol     Activity as tolerated     DNR (Do Not Resuscitate)     Oxygen   Order Comments: High flow nasal cannula at 15 LPM         Significant Diagnostic Studies:  Radiology:   Imaging Results          X-Ray Chest AP Portable (Final result)  Result time 01/05/18 12:50:35    Final result by Bret Forrest MD (01/05/18 12:50:35)                 Impression:     No significant change.         Electronically signed by: Bret Forrest MD  Date:     01/05/18  Time:    12:50              Narrative:    One view chest    Comparison: 1/1/18     Findings: The opacified left lung, similar to the prior exam.  Left thoracostomy tube, unchanged.  Mild scattered right lung opacities similar to the prior exam.  Probable small right effusion.  No gross pneumothorax.  Heart size unchanged.                             X-Ray Chest 1 View (Final result)  Result time 01/01/18 08:26:49    Final result by Brandan Merida MD (01/01/18 08:26:49)                 Impression:     No  significant/detrimental change       Electronically signed by: Dr. Brandan Merida  Date:     01/01/18  Time:    08:26              Narrative:    Portable chest    Comparison: December 31, 2017    Findings: Cardiac silhouette and mediastinum are stable. Lungs demonstrate persistent left lung airspace opacity. Bilateral interstitial prominence remains with some improved aeration of the right lung base.                             X-Ray Abdomen AP 1 View (Final result)  Result time 12/30/17 13:15:13    Final result by Brandan Merida MD (12/30/17 13:15:13)                 Impression:        Mild both small and large bowel gaseous distention suggesting ileus.      Electronically signed by: Dr. Brandan Merida  Date:     12/30/17  Time:    13:15              Narrative:    Single view abdomen    Findings: Large and small bowel gas present demonstrating mild distention. No evidence of high-grade obstruction, free air or abnormal mass effect. No suspicious calcifications.                             X-Ray Chest 1 View (Final result)  Result time 12/30/17 09:49:26    Final result by Brandan Merida MD (12/30/17 09:49:26)                 Impression:     Overall similar appearance of the chest without detrimental change.      Electronically signed by: Dr. Brandan Merida  Date:     12/30/17  Time:    09:49              Narrative:    Single view chest    Comparison: December 29, 2017    Findings: The cardiac silhouette and mediastinum are stable. Lungs are similar in appearance with bilateral interstitial and airspace changes. Consolidation is most notable in the left mid and upper lung. Presumed left pleural catheter remains.                             X-Ray Chest 1 View (Final result)  Result time 12/29/17 07:59:57    Final result by Gamal Tirado MD (12/29/17 07:59:57)                 Impression:     Continued bilateral lung consolidation and pleural effusions without significant interval  change.      Electronically signed by: SHELBI NOBLES MD  Date:     12/29/17  Time:    07:59              Narrative:    Portable AP view of the chest was obtained.  Comparison -- 12/28.  The heart size is normal.  Mediastinal structures midline.  Lungs are expanded .  Patchy consolidation in the right lung toward the base is about the same.  Left lung continues to show widespread air space consolidation with air bronchograms.  Lung consolidation could represent edema, pneumonia, aspiration, etc.  Mild bilateral pleural effusions are unchanged, left more than right.  A pleural catheter remains on the left near the diaphragm.  No new development is noted.                             CT Chest W Wo Contrast (Final result)  Result time 12/28/17 10:25:52    Final result by Brandan Merida MD (12/28/17 10:25:52)                 Impression:        Extensive abnormality within the chest, abdomen and pelvis as above with diffuse metastatic disease. Primary tumor is of uncertain origin as liver, renal and pancreatic lesions are noted. Multiple mediastinal and hilar lymph nodes presents with possible left hilar pulmonary mass versus conglomerate mahesh mass, difficult to distinguish from consolidated lung.    Bilateral pulmonary opacity present most prevalent in the left upper lobe with early lung necrosis. Small to moderate layering right pleural effusion with left basilar pleural effusion containing small chest tube.    Multiple metastatic lesions within the abdomen and subcutaneous soft tissues of the chest, abdomen and pelvis.        Electronically signed by: Dr. Brandan Merida  Date:     12/28/17  Time:    10:25              Narrative:    CT chest with and without contrast:  CT abdomen/pelvis with and without contrast:    History: Lung mass, metastatic evaluation    Comparison: CT chest December 22, 2017    Technique: Routine imaging through the chest, abdomen and pelvis was performed prior to and post 75 cc  Omnipaque IV contrast.    Findings:    Thoracic aorta and great vessels are unremarkable. Heart demonstrates no chamber enlargement. Small pericardial effusion present.    There are multiple pathologically enlarged centrally necrotic lymph nodes involving the left supraclavicular, right axillary, right/left paratracheal region, prevascular space, AP window, precarinal, subcarinal and left greater than right hilar locations.    There is soft tissue fullness about the left hilar region difficult to distinguish from possible lung mass or consolidation. There is mild narrowing of the left main pulmonary artery and distal left main bronchus. There is encasement of the proximal left upper and lower lobe bronchi and vascular structures. Pulmonary necrotic changes noted within the left upper lobe.    There is patchy air space changes noted within the right upper lobe, right middle lobe, right lower lobe and peripheral left lower lobe with bilateral dependent lower lobe atelectatic opacity.    Small left-sided effusion presents with chest tube in place. There is a small to moderate where in right-sided pleural effusion.    Within the abdomen, the liver demonstrates multiple hypoattenuating lesions, largest measuring 2.9 cm on axial images 68. Gallbladder is unremarkable. No biliary dilatation. Hepatic veins, IVC and portal veins are patent.    Right kidney demonstrates multiple round areas of decreased enhancement, largest 3 cm within the medial mid to upper pole. Left kidney demonstrates a 1 cm exophytic rim-enhancing lesion within the posterior aspect of the upper pole.    Spleen is unremarkable. Pancreas demonstrates a 1.7 cm hypodense body mass.    No significant ascites within the abdomen. Small amount of pelvic free fluid noted. There are multiple small para-aortic lymph nodes. Small metastatic foci are present within the left paracolic gutter region, axial image 80. Larger lesion in the left upper quadrant axial image  68.    Multiple subcutaneous and intramuscular metastatic foci are present, largest within the gluteal muscles. No large osseous metastatic lesion. Minimally displaced right anterior lateral sixth rib fracture noted.                             CT Abdomen Pelvis W WO Contrast (Final result)  Result time 12/28/17 10:25:53    Final result by Brandan Merida MD (12/28/17 10:25:53)                 Impression:        Extensive abnormality within the chest, abdomen and pelvis as above with diffuse metastatic disease. Primary tumor is of uncertain origin as liver, renal and pancreatic lesions are noted. Multiple mediastinal and hilar lymph nodes presents with possible left hilar pulmonary mass versus conglomerate mahesh mass, difficult to distinguish from consolidated lung.    Bilateral pulmonary opacity present most prevalent in the left upper lobe with early lung necrosis. Small to moderate layering right pleural effusion with left basilar pleural effusion containing small chest tube.    Multiple metastatic lesions within the abdomen and subcutaneous soft tissues of the chest, abdomen and pelvis.        Electronically signed by: Dr. Brandan Merida  Date:     12/28/17  Time:    10:25              Narrative:    CT chest with and without contrast:  CT abdomen/pelvis with and without contrast:    History: Lung mass, metastatic evaluation    Comparison: CT chest December 22, 2017    Technique: Routine imaging through the chest, abdomen and pelvis was performed prior to and post 75 cc Omnipaque IV contrast.    Findings:    Thoracic aorta and great vessels are unremarkable. Heart demonstrates no chamber enlargement. Small pericardial effusion present.    There are multiple pathologically enlarged centrally necrotic lymph nodes involving the left supraclavicular, right axillary, right/left paratracheal region, prevascular space, AP window, precarinal, subcarinal and left greater than right hilar locations.    There is soft  tissue fullness about the left hilar region difficult to distinguish from possible lung mass or consolidation. There is mild narrowing of the left main pulmonary artery and distal left main bronchus. There is encasement of the proximal left upper and lower lobe bronchi and vascular structures. Pulmonary necrotic changes noted within the left upper lobe.    There is patchy air space changes noted within the right upper lobe, right middle lobe, right lower lobe and peripheral left lower lobe with bilateral dependent lower lobe atelectatic opacity.    Small left-sided effusion presents with chest tube in place. There is a small to moderate where in right-sided pleural effusion.    Within the abdomen, the liver demonstrates multiple hypoattenuating lesions, largest measuring 2.9 cm on axial images 68. Gallbladder is unremarkable. No biliary dilatation. Hepatic veins, IVC and portal veins are patent.    Right kidney demonstrates multiple round areas of decreased enhancement, largest 3 cm within the medial mid to upper pole. Left kidney demonstrates a 1 cm exophytic rim-enhancing lesion within the posterior aspect of the upper pole.    Spleen is unremarkable. Pancreas demonstrates a 1.7 cm hypodense body mass.    No significant ascites within the abdomen. Small amount of pelvic free fluid noted. There are multiple small para-aortic lymph nodes. Small metastatic foci are present within the left paracolic gutter region, axial image 80. Larger lesion in the left upper quadrant axial image 68.    Multiple subcutaneous and intramuscular metastatic foci are present, largest within the gluteal muscles. No large osseous metastatic lesion. Minimally displaced right anterior lateral sixth rib fracture noted.                             MRI Brain W WO Contrast (Final result)  Result time 12/28/17 09:50:13    Final result by Brandan Merida MD (12/28/17 09:50:13)                 Impression:     Findings compatible with  metastatic disease, largest lesion within the left cerebellar hemisphere. Largest cerebral lesion present within the posterior aspect of the right basal ganglia.    Right submandibular adenopathy.      Electronically signed by: Dr. Brandan Merida  Date:     12/28/17  Time:    09:50              Narrative:    MRI brain with and without contrast    Technique: Multisequence, multiplanar MR imaging of the brain performed prior to and post 4.5 cc Gadovist.    Findings: The cortical sulcal pattern is normal for age. No midline shift or concerning mass effect. No MR evidence of acute stroke present.    There multiple bilateral primarily rim-enhancing supratentorial cerebral lesions consistent with metastatic disease. Largest is noted at the posterior aspect of the right basal ganglia measuring 6 mm with surrounding edema.    There is a larger 1.6 cm enhancing lesion within the left cerebellar hemisphere with surrounding edema. No significant mass effect on the fourth ventricle.    No acute hemorrhage.    Orbits, mastoid air cells and paranasal sinuses are unremarkable. major. intracranial flow voids noted.    Enlarged right submandibular pathologic lymph nodes present, incompletely imaged.                             X-Ray Chest 1 View (Final result)  Result time 12/28/17 08:01:16    Final result by Violette Cruz MD (12/28/17 08:01:16)                 Impression:     There is continued opacification of the left lung appearing unchanged from prior exam with interval development of consolidation in the right lower lung zone.      Electronically signed by: VIOLETTE CRUZ MD  Date:     12/28/17  Time:    08:01              Narrative:    There is continued dense consolidation involving the left lung but appears unchanged from prior exam. There's been interval development of patchy opacification involving the right lower lung zone.                             X-Ray Chest 1 View (Final result)  Result time 12/27/17 14:43:37     Final result by Daniel Zee MD (12/27/17 14:43:37)                 Impression:        Interval placement of Pleurx catheter on the left.  Interval decrease in pleural fluid.  No evidence of significant post procedure pneumothorax.    Large area of consolidation throughout the left lung and smaller areas of consolidation throughout the right lung.      Electronically signed by: DANIEL ZEE MD  Date:     12/27/17  Time:    14:43              Narrative:    Comparison: 12/22/17    Technique: Single view of the chest.    Findings: Interval placement of a left-sided Pleurx catheter.  Interval decrease in pleural fluid.  No evidence of significant post procedure pneumothorax.  Continued large area of consolidation throughout the left lung.  Small right pleural effusion with scattered small areas of consolidation throughout the right lung which appear unchanged.  Cardiac silhouette is stable.  Bones demonstrate no acute abnormality.                             X-Ray Chest 1 View (Final result)  Result time 12/27/17 06:49:09    Final result by Shannan Mueller MD (12/27/17 06:49:09)                 Impression:         As above.      Electronically signed by: SHANNAN MUELLER  Date:     12/27/17  Time:    06:49              Narrative:    Comparison:12/26/2017 1513 hrs.    Technique: Single AP portable chest radiograph.    Findings:   Cardiac monitoring leads overlie the chest. Cardiomediastinal silhouette appears unchanged. Diffuse multifocal bilateral opacities are again identified. There is increased opacification of the left hemithorax compared to most recent prior examination concerning for combination of increasing left-sided pleural effusion with associated atelectasis and/or consolidation. No definite evidence of pneumothorax.                             X-Ray Chest 1 View (Final result)  Result time 12/26/17 15:21:49    Final result by Rubin Link MD (12/26/17 15:21:49)                 Impression:         Notable interval decrease in size of the left-sided pleural effusion following thoracentesis. No significant pneumothorax.    Persistent patchy airspace disease bilaterally, particularly in the left mid to upper lung zone. No new focal consolidation identified.      Electronically signed by: NONA NARVAEZ MD  Date:     12/26/17  Time:    15:21              Narrative:    XR Chest    12/26/17 15:11:28    Accession #: [id number]    CLINICAL INDICATION: 42 year old F with s/p left thoracentesis     TECHNIQUE: Single frontal chest radiograph.    COMPARISON:  CT 12/22/2017, radiograph 12/26/2017.    FINDINGS                             X-Ray Chest AP Portable (Final result)  Result time 12/26/17 13:12:23    Final result by Connor Jose MD (12/26/17 13:12:23)                 Impression:     As above.      Electronically signed by: CONNOR JOSE MD  Date:     12/26/17  Time:    13:12              Narrative:    Chest AP portable.    Findings: There is extensive diffuse left-sided groundglass density which is significantly worse compared to most recent exam dated 12/22/17. There is no pneumothorax. The remainder of the examination is unchanged.                                Pending Diagnostic Studies:     None         Medications:  Reconciled Home Medications:   Discharge Medication List as of 1/8/2018  4:36 PM      START taking these medications    Details   albuterol-ipratropium 2.5mg-0.5mg/3mL (DUO-NEB) 0.5 mg-3 mg(2.5 mg base)/3 mL nebulizer solution Take 3 mLs by nebulization every 4 (four) hours as needed for Wheezing. Rescue, Starting Mon 1/8/2018, Until Tue 1/8/2019, No Print      dextrose 5 % SolP 100 mL with HYDROmorphone 2 mg/mL Soln 20 mg Inject 0.5 mg/hr into the vein continuous., Starting Mon 1/8/2018, No Print      HYDROmorphone (DILAUDID) 1 mg/mL Syrg Inject 1 mL (1 mg total) into the vein every hour as needed (pain or severe dyspnea)., Starting Mon 1/8/2018, No Print      hyoscyamine (LEVSIN) 0.5 mg/mL  injection Inject 0.5 mLs (0.25 mg total) into the vein every 4 (four) hours as needed (excessive secretions)., Starting Mon 1/8/2018, No Print      lorazepam 2 mg/ml oral conc (ATIVAN) 2 mg/mL Conc Take 1 mL (2 mg total) by mouth every 2 (two) hours as needed (anxiety)., Starting Mon 1/8/2018, Until Wed 2/7/2018, No Print         STOP taking these medications       ascorbic acid (VITAMIN C) 500 MG tablet Comments:   Reason for Stopping:         buPROPion (WELLBUTRIN XL) 300 MG 24 hr tablet Comments:   Reason for Stopping:         diphenhydrAMINE (SOMINEX) 25 mg tablet Comments:   Reason for Stopping:         enoxaparin (LOVENOX) 40 mg/0.4 mL Syrg Comments:   Reason for Stopping:         hydrocodone-acetaminophen 7.5-325mg (NORCO) 7.5-325 mg per tablet Comments:   Reason for Stopping:         levoFLOXacin (LEVAQUIN) 750 MG tablet Comments:   Reason for Stopping:         LORazepam (ATIVAN) 1 MG tablet Comments:   Reason for Stopping:         multivitamin (ONE DAILY MULTIVITAMIN) per tablet Comments:   Reason for Stopping:         pseudoephedrine-guaifenesin  mg (MUCINEX D)  mg per tablet Comments:   Reason for Stopping:         VENTOLIN HFA 90 mcg/actuation inhaler Comments:   Reason for Stopping:               Indwelling Lines/Drains at time of discharge:   Lines/Drains/Airways     Drain                 Chest Tube 12/27/17 1110 1 Left Other (Comment) 13 days         Drain/Device  12/27/17 1100 Left anterior chest other (see comments) 13 days                Time spent on the discharge of patient: 40 minutes  Patient was seen and examined on the date of discharge and determined to be suitable for discharge.       Kristian Chiang MD  Department of Hospital Medicine  [facility]

## 2018-02-23 LAB
ACID FAST MOD KINY STN SPEC: NORMAL
MYCOBACTERIUM SPEC QL CULT: NORMAL

## 2020-01-29 NOTE — ASSESSMENT & PLAN NOTE
Admission for chest tube placement  Acute hypoxemic respiratory failure  Malignant neoplasm of left lung stage 4  Malignant neoplasm metastatic to lymph node of axilla  Asthma  Brain metastases with cerebral edema  Continue supplemental oxygen. Continue HFNC. Continue scheduled nebulizer treatments.  Continue prophylactic enoxaparin. Pulmonology placed Pleur-X catheter.  Started chemotherapy and will receive next round on 1/17/18.  Steroids started for brain metastases, and pantoprazole started due to abdominal complaints.  Give vanilla Boost supplements. Continue dexamethasone to BID.    n/a

## 2021-09-13 NOTE — PLAN OF CARE
Problem: Patient Care Overview  Goal: Plan of Care Review  Outcome: Ongoing (interventions implemented as appropriate)  Pt on HFNC with sats of 88%.  Patient family refused breathing treatments at this time.  Will try again later.  Will continue to monitor.         Detail Level: Generalized

## 2021-11-03 NOTE — HPI
"Ms. Falcon is a 43yo female with a PMHx of asthma and 10 pack-year smoking history. Per patient, her symptoms started approximately 1-1.5 months prior to this admission. She was in her usual state of health when she felt she was having an asthma attack. Rarely, she experiences asthma that are quickly alleviated with albuterol, but this attack did not jose cruz. She went to the [facility] ED and was treated with PO abx and discharged from ED with a diagnosis of community-acquired pneumonia. She continued to experience symptoms of SOB and visited her PCP approximately 1 month ago. CXR, per patient, "showed fluid in the lung". She was prescribed another antibiotic. In passing, she mentioned to her PCP a small bump in her right axilla and another small bump on her right chest wall. PCP referred her to Dr. Lilia Stauffer, pulmonology. After seeing Dr. Stauffer, she was sent for biopsy of right axillary node on 12/17/17. Pt states that at the time of her biopsy she was quite SOB and had difficulty laying flat. On day of admission, 12/21/17, pt simply had persistent symptoms and felt that her issues weren't being addressed at , so she decided to go to [facility]. [facility] arranged transfer to this facility. Upon admission, CXR showed large left pleural effusion. 1.2L successfully drained by IR with results pending. +5lb weight loss over past 2 months ("enough for my coworkers to notice).    Pt smoked ~10 cigarettes per day for 20 years - stopped 4 years ago. She works as an  for a hotel [employer]. No personal hx of malignancy. No EtOH, no IVDU. Regarding infection risk, born and raised in [place], no incarceration, no homelessness, no known TB contacts. +Flu shot this year.  " [Normal] : mucosa is normal [Midline] : trachea located in midline position

## 2022-10-17 NOTE — ED TRIAGE NOTES
Pt presents to ED via EMS with c/o SOB. Pt reports that she was experiencing extreme dyspnea on exertion and activity at home. EMS reports pt was 80% on RA on scene. Pt performed one duoneb tx at home and one with EMS. Pt recently received positive biopsy results. Via EMS, pt has hx of asthma and anxiety.  
Xray Chest 1 View- PORTABLE-Urgent

## 2024-02-20 NOTE — PLAN OF CARE
"  5:45pm, Tn received call from [facility] on-call , Mr. Carpio. He will be to deliver O2 for pt between 6:45-7:15pm    No HH ordered    Home O2 orders sent to [facility], [phone], fax [phone] (Mr. Carpio's fax), Mr Carpio will deliver bedside to pt prior to discharge. (Mr Carpio stated that it "would be a while" he has 3 other pts waiting for O2 delivery ahead of her)    Pt's nurse will go over medications/signs and symptoms prior to discharge       12/23/17 1614   Final Note   Assessment Type Final Discharge Note   Discharge Disposition Home   What phone number can be called within the next 1-3 days to see how you are doing after discharge? [phone]   Hospital Follow Up  Appt(s) scheduled? No  (offices closed, [place] to follow up)   Right Care Referral Info   Post Acute Recommendation No Care     Anne-Marie Verduzco, RN Transitional Navigator  [phone]    " Returned patient call. He had questions about his insurance gave him the number to billing.